# Patient Record
Sex: FEMALE | Race: WHITE | NOT HISPANIC OR LATINO | Employment: FULL TIME | URBAN - METROPOLITAN AREA
[De-identification: names, ages, dates, MRNs, and addresses within clinical notes are randomized per-mention and may not be internally consistent; named-entity substitution may affect disease eponyms.]

---

## 2017-07-13 ENCOUNTER — OFFICE VISIT (OUTPATIENT)
Dept: FAMILY MEDICINE | Facility: CLINIC | Age: 52
End: 2017-07-13
Payer: COMMERCIAL

## 2017-07-13 VITALS
HEIGHT: 70 IN | BODY MASS INDEX: 21.33 KG/M2 | DIASTOLIC BLOOD PRESSURE: 79 MMHG | TEMPERATURE: 97.3 F | WEIGHT: 149 LBS | OXYGEN SATURATION: 99 % | HEART RATE: 79 BPM | SYSTOLIC BLOOD PRESSURE: 120 MMHG

## 2017-07-13 DIAGNOSIS — R10.2 PELVIC PAIN IN FEMALE: Primary | ICD-10-CM

## 2017-07-13 DIAGNOSIS — K21.9 GASTROESOPHAGEAL REFLUX DISEASE WITHOUT ESOPHAGITIS: ICD-10-CM

## 2017-07-13 LAB
ALBUMIN UR-MCNC: NEGATIVE MG/DL
APPEARANCE UR: CLEAR
BILIRUB UR QL STRIP: NEGATIVE
COLOR UR AUTO: YELLOW
GLUCOSE UR STRIP-MCNC: NEGATIVE MG/DL
HGB UR QL STRIP: ABNORMAL
KETONES UR STRIP-MCNC: NEGATIVE MG/DL
LEUKOCYTE ESTERASE UR QL STRIP: NEGATIVE
MICRO REPORT STATUS: NORMAL
NITRATE UR QL: NEGATIVE
PH UR STRIP: 5.5 PH (ref 5–7)
RBC #/AREA URNS AUTO: ABNORMAL /HPF (ref 0–2)
SP GR UR STRIP: >1.03 (ref 1–1.03)
SPECIMEN SOURCE: NORMAL
URN SPEC COLLECT METH UR: ABNORMAL
UROBILINOGEN UR STRIP-ACNC: 0.2 EU/DL (ref 0.2–1)
WBC #/AREA URNS AUTO: ABNORMAL /HPF (ref 0–2)
WET PREP SPEC: NORMAL

## 2017-07-13 PROCEDURE — 87210 SMEAR WET MOUNT SALINE/INK: CPT | Performed by: PHYSICIAN ASSISTANT

## 2017-07-13 PROCEDURE — 99214 OFFICE O/P EST MOD 30 MIN: CPT | Performed by: PHYSICIAN ASSISTANT

## 2017-07-13 PROCEDURE — 81001 URINALYSIS AUTO W/SCOPE: CPT | Performed by: PHYSICIAN ASSISTANT

## 2017-07-13 RX ORDER — OMEPRAZOLE 40 MG/1
40 CAPSULE, DELAYED RELEASE ORAL DAILY
Qty: 90 CAPSULE | Refills: 1 | Status: SHIPPED | OUTPATIENT
Start: 2017-07-13 | End: 2017-07-19 | Stop reason: DRUGHIGH

## 2017-07-13 NOTE — MR AVS SNAPSHOT
After Visit Summary   7/13/2017    Emily Fritz    MRN: [id number]           Patient Information     Date Of Birth          1965        Visit Information        Provider Department      7/13/2017 1:30 PM Kehr, Kristen M, PA-C [facility]        Today's Diagnoses     Pelvic pain in female    -  1    Gastroesophageal reflux disease without esophagitis          Care Instructions    Contact Girish Márquez [phone] to schedule appointment for pelvic ultrasound          Follow-ups after your visit        Future tests that were ordered for you today     Open Future Orders        Priority Expected Expires Ordered    US Pelvic Complete w Transvaginal Routine  7/13/2018 7/13/2017            Who to contact     If you have questions or need follow up information about today's clinic visit or your schedule please contact [facility] directly at [phone].  Normal or non-critical lab and imaging results will be communicated to you by Zephyrhart, letter or phone within 4 business days after the clinic has received the results. If you do not hear from us within 7 days, please contact the clinic through Zephyrhart or phone. If you have a critical or abnormal lab result, we will notify you by phone as soon as possible.  Submit refill requests through Plexx or call your pharmacy and they will forward the refill request to us. Please allow 3 business days for your refill to be completed.          Additional Information About Your Visit        MyChart Information     Plexx gives you secure access to your electronic health record. If you see a primary care provider, you can also send messages to your care team and make appointments. If you have questions, please call your primary care clinic.  If you do not have a primary care provider, please call [phone] and they will assist you.        Care EveryWhere ID     This is your Care EveryWhere ID. This could be used by other  "organizations to access your [place] medical records  [id number]        Your Vitals Were     Pulse Temperature Height Pulse Oximetry Breastfeeding?       79 97.3  F (36.3  C) (Oral) 5' 10\" (1.778 m) 99% No     BMI (Body Mass Index)                   21.38 kg/m2            Blood Pressure from Last 3 Encounters:   07/13/17 120/79   09/23/16 133/87   09/12/16 120/80    Weight from Last 3 Encounters:   07/13/17 149 lb (67.6 kg)   09/23/16 146 lb 9.6 oz (66.5 kg)   09/12/16 149 lb (67.6 kg)              We Performed the Following     *UA reflex to Microscopic and Culture ([place] and [facility] (except [place] and [place])     Urine Microscopic     Wet prep          Where to get your medicines      These medications were sent to [facility]/pharmacy #[id number] - [place] [id number] [facility],  AT CORNER OF [address], [facility] [address]     Phone:  [phone]     omeprazole 40 MG capsule          Primary Care Provider Office Phone # Fax #    Kiley Vick [phone]       [facility] [address] [facility] [address]        Equal Access to Services     KELECHI PEMBERTON : Hadii jose antonio ku hadasho Soomaali, waaxda luqadaha, qaybta kaalmada adeegyada, waxay omeroin haysanya weller. So [facility] [phone].    ATENCIÓN: Si habla español, tiene a grider disposición servicios gratuitos de asistencia lingüística. Llame al [phone].    We comply with applicable federal civil rights laws and [place] laws. We do not discriminate on the basis of race, color, national origin, age, disability sex, sexual orientation or gender identity.            Thank you!     Thank you for choosing [facility]  for your care. Our goal is always to provide you with excellent care. Hearing back from our patients is one way we can continue to improve our services. Please take a few minutes to complete the written survey that you may receive in the " mail after your visit with us. Thank you!             Your Updated Medication List - Protect others around you: Learn how to safely use, store and throw away your medicines at www.disposemymeds.org.          This list is accurate as of: 7/13/17  2:27 PM.  Always use your most recent med list.                   Brand Name Dispense Instructions for use Diagnosis    azelastine 0.1 % spray    ASTELIN    3 Bottle    Spray 1-2 sprays into both nostrils 2 times daily    Chronic rhinitis, Dysfunction of eustachian tube, bilateral, Adhesive otitis media, left       conjugated estrogens cream    PREMARIN    30 g    Place 1 g vaginally See Admin Instructions Place 1 gram vaginally at night for 2 weeks, then reduce to twice weekly    Atrophic vaginitis       [employer]S PURELAX powder   Generic drug:  polyethylene glycol           fluticasone 50 MCG/ACT spray    FLONASE    16 g    Spray 2 sprays into both nostrils daily    Seasonal allergic rhinitis       * omeprazole 20 MG CR capsule    priLOSEC     Take 20 mg by mouth as needed        * omeprazole 40 MG capsule    priLOSEC    90 capsule    Take 1 capsule (40 mg) by mouth daily Take 30-60 minutes before a meal.    Gastroesophageal reflux disease without esophagitis       * Notice:  This list has 2 medication(s) that are the same as other medications prescribed for you. Read the directions carefully, and ask your doctor or other care provider to review them with you.

## 2017-07-13 NOTE — PROGRESS NOTES
SUBJECTIVE:                                                    Emily Fritz is a 52 year old female who presents to clinic today for the following health issues:      Emily has chronic pelvic pain. She has had some radiation of the pain in the lower right extremity when the pain is worse. She has had the pain associated with ovarian cysts in the past and concerned about having a cyst since the pain has been worse for a few months. There is no low back pain. No dysuria. She has had some vaginal discharge, but no bleeding.   Musculoskeletal problem/pain      Duration: x 1 year for pelvic and 2-3 months for both legs    Description  Location: r side pelvic pain and both legs    Intensity:  mild    Accompanying signs and symptoms: Sharp shooting pain    History  Previous similar problem: YES  Previous evaluation:  none    Precipitating or alleviating factors:  Trauma or overuse: no   Aggravating factors include: sitting    Therapies tried and outcome: nothing        Problem list and histories reviewed & adjusted, as indicated.  Additional history: as documented    Patient Active Problem List   Diagnosis     CARDIOVASCULAR SCREENING; LDL GOAL LESS THAN 160     Cholelithiasis     Diagnostic skin and sensitization tests     Allergic rhinitis due to animal dander     Rhinitis, allergic to other allergen     House dust mite allergy     Lateral epicondylitis     Vitamin D deficiency disease     Heart palpitations     Family history of colonic polyps     Family history of breast cancer in mother     Gastroesophageal reflux disease without esophagitis     Menorrhagia with regular cycle     Past Surgical History:   Procedure Laterality Date     C/SECTION, LOW TRANSVERSE  1988     CHOLECYSTECTOMY  2010     DILATION AND CURETTAGE, HYSTEROSCOPY DIAGNOSTIC, COMBINED  8/25/16    Menometrorrhagia       Social History   Substance Use Topics     Smoking status: Former Smoker     Years: 10.00     Quit date: 1/1/1995      Smokeless tobacco: Never Used      Comment: lives in smoke free household     Alcohol use No     Family History   Problem Relation Age of Onset     DIABETES Mother 78     Hypertension Mother      Thyroid Disease Mother      CANCER Mother 78     breast-mastectomy     Circulatory Mother      DVT     Breast Cancer Mother 79     Hypertension Father      CEREBROVASCULAR DISEASE Father      HEART DISEASE Father      CHF     Hypertension Sister      Other Cancer Sister      Carcinoid Cancer     Breast Cancer Maternal Aunt      postmenopausal     Macular Degeneration Paternal Aunt      Breast Cancer Maternal Aunt      postmenopausal     GASTROINTESTINAL DISEASE Sister      gastric carcinoid tumors with mets to liver     Glaucoma No family hx of          Current Outpatient Prescriptions   Medication Sig Dispense Refill     omeprazole (PRILOSEC) 20 MG CR capsule Take 20 mg by mouth as needed        omeprazole (PRILOSEC) 40 MG capsule Take 1 capsule (40 mg) by mouth daily Take 30-60 minutes before a meal. 90 capsule 1     azelastine (ASTELIN) 0.1 % nasal spray Spray 1-2 sprays into both nostrils 2 times daily 3 Bottle 1     conjugated estrogens (PREMARIN) vaginal cream Place 1 g vaginally See Admin Instructions Place 1 gram vaginally at night for 2 weeks, then reduce to twice weekly 30 g 12     CVS PURELAX powder   6     fluticasone (FLONASE) 50 MCG/ACT nasal spray Spray 2 sprays into both nostrils daily 16 g 1     Allergies   Allergen Reactions     Nkda [No Known Drug Allergies]        ROS:  C: NEGATIVE for fever, chills, change in weight  E/M: NEGATIVE for ear, mouth and throat problems  R: NEGATIVE for significant cough or SOB  CV: NEGATIVE for chest pain, palpitations or peripheral edema  GI: NEGATIVE for nausea, abdominal pain, heartburn, or change in bowel habits  : menopausal. No bleeding. She did have a D & C last year. No bleeding since. The pain has been ongoing in her pelvis. History of ovarian cysts and also  "history of C section.   MUSCULOSKELETAL: NEGATIVE for significant arthralgias or myalgia  PSYCHIATRIC: NEGATIVE for changes in mood or affect    OBJECTIVE:     /79  Pulse 79  Temp 97.3  F (36.3  C) (Oral)  Ht 5' 10\" (1.778 m)  Wt 149 lb (67.6 kg)  LMP   SpO2 99%  Breastfeeding? No  BMI 21.38 kg/m2  Body mass index is 21.38 kg/(m^2).  GENERAL: healthy, alert and no distress  ABDOMEN: soft, nontender, no hepatosplenomegaly, no masses and bowel sounds normal   (female): normal female external genitalia, normal urethral meatus, vaginal mucosa, normal cervix/adnexa/uterus without masses or discharge  MS: no gross musculoskeletal defects noted, no edema  SKIN: no suspicious lesions or rashes  BACK: no CVA tenderness, no paralumbar tenderness  PSYCH: mentation appears normal, affect normal/bright    Diagnostic Test Results:  Results for orders placed or performed in visit on 07/13/17   *UA reflex to Microscopic and Culture ([place] and [facility] (except [place] and [place])   Result Value Ref Range    Color Urine Yellow     Appearance Urine Clear     Glucose Urine Negative NEG mg/dL    Bilirubin Urine Negative NEG    Ketones Urine Negative NEG mg/dL    Specific Gravity Urine >1.030 1.003 - 1.035    Blood Urine Small (A) NEG    pH Urine 5.5 5.0 - 7.0 pH    Protein Albumin Urine Negative NEG mg/dL    Urobilinogen Urine 0.2 0.2 - 1.0 EU/dL    Nitrite Urine Negative NEG    Leukocyte Esterase Urine Negative NEG    Source Midstream Urine    Urine Microscopic   Result Value Ref Range    WBC Urine O - 2 0 - 2 /HPF    RBC Urine 2-5 (A) 0 - 2 /HPF   Wet prep   Result Value Ref Range    Specimen Description Vagina     Wet Prep       No Trichomonas seen  No clue cells seen  No yeast seen      Micro Report Status FINAL 07/13/2017        ASSESSMENT/PLAN:       1. Pelvic pain in female  The above results reviewed and negative.   Plan ultrasound.   - US Pelvic Complete w Transvaginal; Future  - Wet prep  - *UA " reflex to Microscopic and Culture ([place] and [facility] (except [place] and [place])  - Urine Microscopic    2. Gastroesophageal reflux disease without esophagitis  Refills given  - omeprazole (PRILOSEC) 40 MG capsule; Take 1 capsule (40 mg) by mouth daily Take 30-60 minutes before a meal.  Dispense: 90 capsule; Refill: 1      Kristen M. Kehr, PA-C  [facility]

## 2017-07-18 ENCOUNTER — RADIANT APPOINTMENT (OUTPATIENT)
Dept: ULTRASOUND IMAGING | Facility: CLINIC | Age: 52
End: 2017-07-18
Attending: PHYSICIAN ASSISTANT
Payer: COMMERCIAL

## 2017-07-18 DIAGNOSIS — R10.2 PELVIC PAIN IN FEMALE: ICD-10-CM

## 2017-07-18 PROCEDURE — 76830 TRANSVAGINAL US NON-OB: CPT

## 2017-07-18 PROCEDURE — 76856 US EXAM PELVIC COMPLETE: CPT

## 2017-09-05 ENCOUNTER — OFFICE VISIT (OUTPATIENT)
Dept: OPTOMETRY | Facility: CLINIC | Age: 52
End: 2017-09-05
Payer: COMMERCIAL

## 2017-09-05 DIAGNOSIS — H04.123 DRY EYES: ICD-10-CM

## 2017-09-05 DIAGNOSIS — H52.03 HYPEROPIA, BILATERAL: Primary | ICD-10-CM

## 2017-09-05 DIAGNOSIS — H52.4 PRESBYOPIA: ICD-10-CM

## 2017-09-05 PROCEDURE — 92310 CONTACT LENS FITTING OU: CPT | Mod: GA | Performed by: OPTOMETRIST

## 2017-09-05 PROCEDURE — 92015 DETERMINE REFRACTIVE STATE: CPT | Performed by: OPTOMETRIST

## 2017-09-05 PROCEDURE — 92014 COMPRE OPH EXAM EST PT 1/>: CPT | Performed by: OPTOMETRIST

## 2017-09-05 ASSESSMENT — REFRACTION_WEARINGRX
OS_AXIS: 032
OS_ADD: +2.00
OS_CYLINDER: +0.50
SPECS_TYPE: PAL
OS_SPHERE: +2.00
OD_ADD: +2.00
OD_SPHERE: +2.50

## 2017-09-05 ASSESSMENT — REFRACTION_MANIFEST
METHOD_AUTOREFRACTION: 1
OS_SPHERE: +2.25
OD_SPHERE: +2.50
OS_AXIS: 072
OS_SPHERE: +2.25
OD_SPHERE: +2.25
OS_CYLINDER: SPHERE
OS_CYLINDER: +0.50

## 2017-09-05 ASSESSMENT — EXTERNAL EXAM - RIGHT EYE: OD_EXAM: NORMAL

## 2017-09-05 ASSESSMENT — VISUAL ACUITY
CORRECTION_TYPE: GLASSES
OS_CC: 20/100-1
OS_CC: 20/20
OD_CC: 20/20
METHOD: SNELLEN - LINEAR
OD_CC+: -1
OD_CC: 20/200

## 2017-09-05 ASSESSMENT — KERATOMETRY
OD_K1POWER_DIOPTERS: 42.50
OD_K2POWER_DIOPTERS: 43.25
OD_AXISANGLE2_DEGREES: 169
OS_K1POWER_DIOPTERS: 43.00
OS_K2POWER_DIOPTERS: 44.00
OS_AXISANGLE2_DEGREES: 1

## 2017-09-05 ASSESSMENT — CONF VISUAL FIELD
OS_NORMAL: 1
OD_NORMAL: 1

## 2017-09-05 ASSESSMENT — EXTERNAL EXAM - LEFT EYE: OS_EXAM: NORMAL

## 2017-09-05 ASSESSMENT — SLIT LAMP EXAM - LIDS
COMMENTS: NORMAL
COMMENTS: NORMAL

## 2017-09-05 NOTE — PATIENT INSTRUCTIONS
Patient was advised of today's exam findings.  Optional to fill new glasses prescription, minimal change  Check with [facility] about current glasses - progressive not working   Order contact lenses trials, pick them up and wear to contact lenses check appointment / dilation  Return in 1 year for eye exam      Meera Roach O.D.  [facility]   [address] [place] [address] [phone]

## 2017-09-05 NOTE — MR AVS SNAPSHOT
After Visit Summary   9/5/2017    Emily Fritz    MRN: [id number]           Patient Information     Date Of Birth          1965        Visit Information        Provider Department      9/5/2017 8:30 AM Meera Roach, UBALDO [facility]        Today's Diagnoses     Hyperopia, bilateral    -  1    Presbyopia          Care Instructions    Patient was advised of today's exam findings.  Optional to fill new glasses prescription, minimal change  Check with [facility] about current glasses - progressive not working   Order contact lenses trials, pick them up and wear to contact lenses check appointment / dilation  Return in 1 year for eye exam      Meera Roach O.D.  [facility]   [address] [place] [address] [phone]              Follow-ups after your visit        Who to contact     If you have questions or need follow up information about today's clinic visit or your schedule please contact [facility] directly at [phone].  Normal or non-critical lab and imaging results will be communicated to you by [employer], letter or phone within 4 business days after the clinic has received the results. If you do not hear from us within 7 days, please contact the clinic through [employer] or phone. If you have a critical or abnormal lab result, we will notify you by phone as soon as possible.  Submit refill requests through [employer] or call your pharmacy and they will forward the refill request to us. Please allow 3 business days for your refill to be completed.          Additional Information About Your Visit        MyChart Information     [employer] gives you secure access to your electronic health record. If you see a primary care provider, you can also send messages to your care team and make appointments. If you have questions, please call your primary care clinic.  If you do not have a primary care provider, please call [phone] and they will assist  you.        Care EveryWhere ID     This is your Care EveryWhere ID. This could be used by other organizations to access your [place] medical records  [id number]         Blood Pressure from Last 3 Encounters:   07/13/17 120/79   09/23/16 133/87   09/12/16 120/80    Weight from Last 3 Encounters:   07/13/17 67.6 kg (149 lb)   09/23/16 66.5 kg (146 lb 9.6 oz)   09/12/16 67.6 kg (149 lb)              We Performed the Following     CONTACT LENS FITTING,BILAT     EYE EXAM (SIMPLE-NONBILLABLE)     REFRACTION        Primary Care Provider Office Phone # Fax #    Kiley Cayla Vick [phone] [address] [facility] [address]        Equal Access to Services     KELECHI PEMBERTON : Hadii aad ku hadasho Soomaali, waaxda luqadaha, qaybta kaalmada adeegyada, waxay idiin hayaan aurora garcia . So [facility] [phone].    ATENCIÓN: Si habla español, tiene a grider disposición servicios gratuitos de asistencia lingüística. Llame al [phone].    We comply with applicable federal civil rights laws and [place] laws. We do not discriminate on the basis of race, color, national origin, age, disability sex, sexual orientation or gender identity.            Thank you!     Thank you for choosing [facility]  for your care. Our goal is always to provide you with excellent care. Hearing back from our patients is one way we can continue to improve our services. Please take a few minutes to complete the written survey that you may receive in the mail after your visit with us. Thank you!             Your Updated Medication List - Protect others around you: Learn how to safely use, store and throw away your medicines at www.disposemymeds.org.          This list is accurate as of: 9/5/17  9:13 AM.  Always use your most recent med list.                   Brand Name Dispense Instructions for use Diagnosis    azelastine 0.1 % spray    ASTELIN    3 Bottle    Spray 1-2 sprays into both nostrils 2 times  daily    Chronic rhinitis, Dysfunction of eustachian tube, bilateral, Adhesive otitis media, left       conjugated estrogens cream    PREMARIN    30 g    Place 1 g vaginally See Admin Instructions Place 1 gram vaginally at night for 2 weeks, then reduce to twice weekly    Atrophic vaginitis       CVS PURELAX powder   Generic drug:  polyethylene glycol           fluticasone 50 MCG/ACT spray    FLONASE    16 g    Spray 2 sprays into both nostrils daily    Seasonal allergic rhinitis       * omeprazole 20 MG CR capsule    priLOSEC     Take 20 mg by mouth as needed        * omeprazole 20 MG CR capsule    priLOSEC    90 capsule    Take 1 capsule (20 mg) by mouth daily    Gastroesophageal reflux disease without esophagitis       * Notice:  This list has 2 medication(s) that are the same as other medications prescribed for you. Read the directions carefully, and ask your doctor or other care provider to review them with you.

## 2017-09-05 NOTE — PROGRESS NOTES
Chief Complaint   Patient presents with     COMPREHENSIVE EYE EXAM     Contact Lens Re-fitting     fee, waiver      needs contact lenses for wedding    Previous contact lens wearer? Yes: [employer] Multifocal  Comfort of contact lenses :good for 5 hours , dry after that  Satisfied with current lenses: Yes, would like near vision better     Wears contact lenses about 6 times a month    Last Eye Exam: 6/17/16  Dilated Previously: Yes    What are you currently using to see?  glasses and contacts    Distance Vision Acuity: Satisfied with vision    Near Vision Acuity: Satisfied with vision while reading and using computer with glasses or contacts, possible slight change    Eye Comfort: good, dry at times  Do you use eye drops? 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       OBJECTIVE: See Ophthalmology exam    ASSESSMENT:    ICD-10-CM    1. Hyperopia, bilateral H52.03 EYE EXAM (SIMPLE-NONBILLABLE)     REFRACTION     CONTACT LENS FITTING,BILAT   2. Presbyopia H52.4 EYE EXAM (SIMPLE-NONBILLABLE)     REFRACTION     CONTACT LENS FITTING,BILAT   3. Dry eyes H04.123       PLAN:     Patient Instructions   Patient was advised of today's exam findings.  Optional to fill new glasses prescription, minimal change  Check with [facility] about current glasses - progressive not working   Order contact lenses trials, pick them up and wear to contact lenses check appointment / dilation  Return in 1 year for eye exam      Meera Roach O.D.  [facility]   [address] [place], [place] [address] [phone]

## 2017-09-06 PROBLEM — H04.123 DRY EYES: Status: ACTIVE | Noted: 2017-09-06

## 2017-09-06 ASSESSMENT — REFRACTION_MANIFEST
OS_ADD: +2.00
OD_CYLINDER: SPHERE
OD_ADD: +2.00

## 2017-09-06 ASSESSMENT — REFRACTION_CURRENTRX
OS_SPHERE: +2.00
OS_BASECURVE: 8.60
OS_ADD: LOW
OD_DIAMETER: 14.0
OD_ADD: LOW
OD_BASECURVE: 8.6
OS_DIAMETER: 14.1
OD_BASECURVE: 8.60
OD_ADDL_SPECS: N
OS_SPHERE: +2.00
OD_BRAND: BIOFINITY MULTIFOCAL
OD_ADD: +2.00
OS_ADD: +2.00
OS_DIAMETER: 14.0
OS_BRAND: BIOFINITY MULTIFOCAL
OD_DIAMETER: 14.1
OS_BASECURVE: 8.6
OD_SPHERE: +2.00
OS_ADDL_SPECS: D
OD_SPHERE: +2.00

## 2017-09-14 ENCOUNTER — E-VISIT (OUTPATIENT)
Dept: FAMILY MEDICINE | Facility: CLINIC | Age: 52
End: 2017-09-14
Payer: COMMERCIAL

## 2017-09-14 DIAGNOSIS — Z83.79 FAMILY HISTORY OF CELIAC DISEASE: Primary | ICD-10-CM

## 2017-09-14 PROCEDURE — 99444 ZZC PHYSICIAN ONLINE EVALUATION & MANAGEMENT SERVICE: CPT | Performed by: FAMILY MEDICINE

## 2017-09-18 ENCOUNTER — MYC REFILL (OUTPATIENT)
Dept: OTOLARYNGOLOGY | Facility: CLINIC | Age: 52
End: 2017-09-18

## 2017-09-18 DIAGNOSIS — Z83.79 FAMILY HISTORY OF CELIAC DISEASE: ICD-10-CM

## 2017-09-18 DIAGNOSIS — H74.12 ADHESIVE OTITIS MEDIA, LEFT: ICD-10-CM

## 2017-09-18 DIAGNOSIS — H69.93 DYSFUNCTION OF EUSTACHIAN TUBE, BILATERAL: ICD-10-CM

## 2017-09-18 DIAGNOSIS — J31.0 CHRONIC RHINITIS: ICD-10-CM

## 2017-09-18 PROCEDURE — 36415 COLL VENOUS BLD VENIPUNCTURE: CPT | Performed by: FAMILY MEDICINE

## 2017-09-18 PROCEDURE — 83516 IMMUNOASSAY NONANTIBODY: CPT | Mod: 59 | Performed by: FAMILY MEDICINE

## 2017-09-18 RX ORDER — AZELASTINE 1 MG/ML
1-2 SPRAY, METERED NASAL 2 TIMES DAILY
Qty: 3 BOTTLE | Refills: 0 | Status: SHIPPED | OUTPATIENT
Start: 2017-09-18 | End: 2022-09-12

## 2017-09-18 NOTE — TELEPHONE ENCOUNTER
Message from [email or web address]:  Original authorizing provider: MD Elsa Masters would like a refill of the following medications:  azelastine (ASTELIN) 0.1 % nasal spray [Cristian Plummer MD]    Preferred pharmacy: [facility]/PHARMACY #[id number] [facility] [address] [facility],  AT [address] [facility]    Comment:

## 2017-09-19 ENCOUNTER — OFFICE VISIT (OUTPATIENT)
Dept: OPTOMETRY | Facility: CLINIC | Age: 52
End: 2017-09-19
Payer: COMMERCIAL

## 2017-09-19 DIAGNOSIS — H52.4 PRESBYOPIA: ICD-10-CM

## 2017-09-19 DIAGNOSIS — H52.03 HYPEROPIA, BILATERAL: Primary | ICD-10-CM

## 2017-09-19 LAB
TTG IGA SER-ACNC: <1 U/ML
TTG IGG SER-ACNC: <1 U/ML

## 2017-09-19 PROCEDURE — 99207 ZZC NO BILLABLE SERVICE THIS VISIT: CPT | Performed by: OPTOMETRIST

## 2017-09-19 PROCEDURE — 92499 UNLISTED OPH SVC/PROCEDURE: CPT | Performed by: OPTOMETRIST

## 2017-09-19 ASSESSMENT — CUP TO DISC RATIO
OS_RATIO: 0.15
OD_RATIO: 0.15

## 2017-09-19 ASSESSMENT — EXTERNAL EXAM - RIGHT EYE: OD_EXAM: NORMAL

## 2017-09-19 ASSESSMENT — EXTERNAL EXAM - LEFT EYE: OS_EXAM: NORMAL

## 2017-09-19 ASSESSMENT — VISUAL ACUITY
CORRECTION_TYPE: CONTACTS
OD_CC: 20/70
OD_CC: 20/25
OS_CC: 20/25
OD_CC+: -2
OS_CC+: -1
OS_CC: 20/50
METHOD: SNELLEN - LINEAR

## 2017-09-19 ASSESSMENT — TONOMETRY
OS_IOP_MMHG: 12
OD_IOP_MMHG: 12
IOP_METHOD: APPLANATION

## 2017-09-19 ASSESSMENT — SLIT LAMP EXAM - LIDS
COMMENTS: NORMAL
COMMENTS: NORMAL

## 2017-09-19 NOTE — MR AVS SNAPSHOT
After Visit Summary   9/19/2017    Emily Fritz    MRN: [id number]           Patient Information     Date Of Birth          1965        Visit Information        Provider Department      9/19/2017 1:20 PM Meera Roach OD [facility]        Care Instructions    Patient was advised of today's exam findings.  Dilation completed today  Order trials, pick them up and wear to contact lenses check appointment.      Meera Roach O.D.  [facility]   [address] [place] [address]  [phone]              Follow-ups after your visit        Who to contact     If you have questions or need follow up information about today's clinic visit or your schedule please contact [facility] directly at [phone].  Normal or non-critical lab and imaging results will be communicated to you by MyChart, letter or phone within 4 business days after the clinic has received the results. If you do not hear from us within 7 days, please contact the clinic through MyChart or phone. If you have a critical or abnormal lab result, we will notify you by phone as soon as possible.  Submit refill requests through [employer] or call your pharmacy and they will forward the refill request to us. Please allow 3 business days for your refill to be completed.          Additional Information About Your Visit        MyChart Information     [employer] gives you secure access to your electronic health record. If you see a primary care provider, you can also send messages to your care team and make appointments. If you have questions, please call your primary care clinic.  If you do not have a primary care provider, please call [phone] and they will assist you.        Care EveryWhere ID     This is your Care EveryWhere ID. This could be used by other organizations to access your [place] medical records  [id number]         Blood Pressure from Last 3 Encounters:   07/13/17 120/79    09/23/16 133/87   09/12/16 120/80    Weight from Last 3 Encounters:   07/13/17 67.6 kg (149 lb)   09/23/16 66.5 kg (146 lb 9.6 oz)   09/12/16 67.6 kg (149 lb)              Today, you had the following     No orders found for display       Primary Care Provider Office Phone # Fax #    Kiley Cayla Vick [phone] [address] [facility] [address]        Equal Access to Services     [facility] NILAY : Hadii aad ku hadasho Soomaali, waaxda luqadaha, qaybta kaalmada adeegyada, waxay idiin hayaan adegold garcia . So [facility] [phone].    ATENCIÓN: Si habla español, tiene a grider disposición servicios gratuitos de asistencia lingüística. [facility] [phone].    We comply with applicable federal civil rights laws and [place] laws. We do not discriminate on the basis of race, color, national origin, age, disability sex, sexual orientation or gender identity.            Thank you!     Thank you for choosing [facility]  for your care. Our goal is always to provide you with excellent care. Hearing back from our patients is one way we can continue to improve our services. Please take a few minutes to complete the written survey that you may receive in the mail after your visit with us. Thank you!             Your Updated Medication List - Protect others around you: Learn how to safely use, store and throw away your medicines at www.disposemymeds.org.          This list is accurate as of: 9/19/17  1:38 PM.  Always use your most recent med list.                   Brand Name Dispense Instructions for use Diagnosis    azelastine 0.1 % spray    ASTELIN    3 Bottle    Spray 1-2 sprays into both nostrils 2 times daily    Chronic rhinitis, Dysfunction of eustachian tube, bilateral, Adhesive otitis media, left       conjugated estrogens cream    PREMARIN    30 g    Place 1 g vaginally See Admin Instructions Place 1 gram vaginally at night for 2 weeks, then reduce to twice weekly     Atrophic vaginitis       CVS PURELAX powder   Generic drug:  polyethylene glycol           fluticasone 50 MCG/ACT spray    FLONASE    16 g    Spray 2 sprays into both nostrils daily    Seasonal allergic rhinitis       * omeprazole 20 MG CR capsule    priLOSEC     Take 20 mg by mouth as needed        * omeprazole 20 MG CR capsule    priLOSEC    90 capsule    Take 1 capsule (20 mg) by mouth daily    Gastroesophageal reflux disease without esophagitis       * Notice:  This list has 2 medication(s) that are the same as other medications prescribed for you. Read the directions carefully, and ask your doctor or other care provider to review them with you.

## 2017-09-19 NOTE — PATIENT INSTRUCTIONS
Patient was advised of today's exam findings.  Dilation completed today  Order trials, pick them up and wear to contact lenses check appointment.      Meera Roach O.D.  [facility]   [address] [place] [address] [phone]

## 2017-09-19 NOTE — PROGRESS NOTES
Chief Complaint   Patient presents with     Contact Lens Check     and DFE     Dilation to complete 9/5/2017      Satisfied with contacts:  Yes , seem to be working well   Good comfort:  Yes  Clear vision:     Ok, but seems to have to refocus when going from keyboard to computer screen, some fuzziness     Charmaine Apple Optometric Assistant           Medical, surgical and family histories reviewed and updated 9/19/2017.       OBJECTIVE: See Ophthalmology exam    ASSESSMENT:    ICD-10-CM    1. Hyperopia, bilateral H52.03 CONTACT LENS CHECK   2. Presbyopia H52.4 CONTACT LENS CHECK      PLAN:    Patient Instructions   Patient was advised of today's exam findings.  Dilation completed today  Order trials, pick them up and wear to contact lenses check appointment.      Meera Roach O.D.  [facility]   [id number] [place] [id number] [phone]                     .

## 2017-09-20 ASSESSMENT — REFRACTION_CURRENTRX
OD_BASECURVE: 8.6
OD_SPHERE: +2.00
OD_SPHERE: +2.50
OS_ADD: LOW
OS_ADD: LOW
OS_SPHERE: +2.25
OS_SPHERE: +2.00
OS_BASECURVE: 8.6
OS_DIAMETER: 14.1
OD_ADD: LOW
OD_DIAMETER: 14.1
OS_BASECURVE: 8.6
OD_ADD: LOW
OS_DIAMETER: 14.1
OD_DIAMETER: 14.1
OD_BASECURVE: 8.6

## 2017-10-30 ENCOUNTER — OFFICE VISIT (OUTPATIENT)
Dept: OPTOMETRY | Facility: CLINIC | Age: 52
End: 2017-10-30
Payer: COMMERCIAL

## 2017-10-30 DIAGNOSIS — H52.4 PRESBYOPIA: Primary | ICD-10-CM

## 2017-10-30 DIAGNOSIS — H52.03 HYPERMETROPIA OF BOTH EYES: ICD-10-CM

## 2017-10-30 PROCEDURE — 92499 UNLISTED OPH SVC/PROCEDURE: CPT | Performed by: OPTOMETRIST

## 2017-10-30 PROCEDURE — 99207 ZZC NO BILLABLE SERVICE THIS VISIT: CPT | Performed by: OPTOMETRIST

## 2017-10-30 ASSESSMENT — REFRACTION_CURRENTRX
OD_DIAMETER: 14.1
OS_BASECURVE: 8.6
OD_DIAMETER: 14.1
OD_BASECURVE: 8.6
OS_SPHERE: +2.00
OS_DIAMETER: 14.1
OS_ADD: LOW
OS_BASECURVE: 8.6
OD_ADD: LOW
OD_SPHERE: +2.50
OS_DIAMETER: 14.1
OS_SPHERE: +2.25
OS_ADD: LOW
OD_SPHERE: +2.50
OD_BASECURVE: 8.6
OD_ADD: LOW

## 2017-10-30 ASSESSMENT — VISUAL ACUITY
OS_CC: 20/50+2
OD_PH_CC: 20/30-1
OD_CC: 20/40
OS_CC: 20/30
OD_CC+: -1
OS_CC+: -2
CORRECTION_TYPE: CONTACTS
METHOD: SNELLEN - LINEAR
OD_CC: 20/30-1

## 2017-10-30 NOTE — PROGRESS NOTES
Chief Complaint   Patient presents with     Contact Lens Check     Satisfied with contacts:  Yes    Good comfort:  Yes, left eye does seem to get dry fast. Said that she does have a generic [facility] drops for contact lens wearers that she uses   Clear vision:     Yes    Charmaine Randall Optometric Assistant           Medical, surgical and family histories reviewed and updated 10/30/2017.       OBJECTIVE: See Ophthalmology exam    ASSESSMENT:    ICD-10-CM    1. Hypermetropia of both eyes H52.03    2. Presbyopia H52.4       PLAN:    Patient Instructions   Patient was advised of today's exam findings.  Use contact lens rewetting drops at least twice a day.  Blink Contacts rewetting drop, Systane Contact rewetting drop and Refresh Contact rewetting drop  Order trials of new prescription to improve diagnosis vision.  Contact lenses prescription released to patient, ok to order supply if content with vision at computer and far   Return to clinic as needed contact lenses check appointment       To order your contacts online please log on to [email or web address] .  Go to the link which is found on the [facility] Care [facility] Vision Services page.    Another option is to call  [phone] to order your contacts.                            Meera Roach O.D.  [facility]   [address] [place], [place] [id number] [phone]

## 2017-10-30 NOTE — PATIENT INSTRUCTIONS
Patient was advised of today's exam findings.  Use contact lens rewetting drops at least twice a day.  Blink Contacts rewetting drop, Systane Contact rewetting drop and Refresh Contact rewetting drop  Order trials of new prescription to improve diagnosis vision.  Contact lenses prescription released to patient, ok to order supply if content with vision at computer and far   Return to clinic as needed contact lenses check appointment       To order your contacts online please log on to [email or web address] .  Go to the link which is found on the Eye Care [facility] Vision Services page.    Another option is to call  [phone] to order your contacts.                            Meera Roach O.D.  [facility]   [address] [place], [place] [id number] [phone]

## 2017-10-30 NOTE — MR AVS SNAPSHOT
After Visit Summary   10/30/2017    Emily Fritz    MRN: [id number]           Patient Information     Date Of Birth          1965        Visit Information        Provider Department      10/30/2017 11:20 AM Meera Roach OD [facility]        Care Instructions    Patient was advised of today's exam findings.  Use contact lens rewetting drops at least twice a day.  Blink Contacts rewetting drop, Systane Contact rewetting drop and Refresh Contact rewetting drop  Order trials of new prescription to improve diagnosis vision.  Contact lenses prescription released to patient, ok to order supply if content with vision at computer and far   Return to clinic as needed contact lenses check appointment       To order your contacts online please log on to [employer] .  Go to the link which is found on the Eye Care [facility] Vision Services page.    Another option is to call  [phone] to order your contacts.                            Meera Roach O.D.  [facility]   [address] [place] [address]  [phone]            Follow-ups after your visit        Who to contact     If you have questions or need follow up information about today's clinic visit or your schedule please contact [facility] directly at [phone].  Normal or non-critical lab and imaging results will be communicated to you by MyChart, letter or phone within 4 business days after the clinic has received the results. If you do not hear from us within 7 days, please contact the clinic through [employer] or phone. If you have a critical or abnormal lab result, we will notify you by phone as soon as possible.  Submit refill requests through [employer] or call your pharmacy and they will forward the refill request to us. Please allow 3 business days for your refill to be completed.          Additional Information About Your Visit        Datap[employer] Information     [employer] gives you secure access  to your electronic health record. If you see a primary care provider, you can also send messages to your care team and make appointments. If you have questions, please call your primary care clinic.  If you do not have a primary care provider, please call [phone] and they will assist you.        Care EveryWhere ID     This is your Care EveryWhere ID. This could be used by other organizations to access your [place] medical records  [id number]         Blood Pressure from Last 3 Encounters:   07/13/17 120/79   09/23/16 133/87   09/12/16 120/80    Weight from Last 3 Encounters:   07/13/17 67.6 kg (149 lb)   09/23/16 66.5 kg (146 lb 9.6 oz)   09/12/16 67.6 kg (149 lb)              Today, you had the following     No orders found for display       Primary Care Provider Office Phone # Fax #    Kiley Cayla Vick [phone] [address] [facility] [address]        Equal Access to Services     KRISTY PEMBERTON : Hadii aad ku hadasho Soomaali, waaxda luqadaha, qaybta kaalmada adeegyada, waxay idiin hayaan aurora weiaraavani garcia . So [facility] [phone].    ATENCIÓN: Si habla español, tiene a grider disposición servicios gratuitos de asistencia lingüística. [facility] [phone].    We comply with applicable federal civil rights laws and [place] laws. We do not discriminate on the basis of race, color, national origin, age, disability, sex, sexual orientation, or gender identity.            Thank you!     Thank you for choosing [facility]  for your care. Our goal is always to provide you with excellent care. Hearing back from our patients is one way we can continue to improve our services. Please take a few minutes to complete the written survey that you may receive in the mail after your visit with us. Thank you!             Your Updated Medication List - Protect others around you: Learn how to safely use, store and throw away your medicines at www.disposemymeds.org.          This  list is accurate as of: 10/30/17 12:01 PM.  Always use your most recent med list.                   Brand Name Dispense Instructions for use Diagnosis    azelastine 0.1 % spray    ASTELIN    3 Bottle    Spray 1-2 sprays into both nostrils 2 times daily    Chronic rhinitis, Dysfunction of Eustachian tube, bilateral, Adhesive otitis media, left       conjugated estrogens cream    PREMARIN    30 g    Place 1 g vaginally See Admin Instructions Place 1 gram vaginally at night for 2 weeks, then reduce to twice weekly    Atrophic vaginitis       [facility] PURELAX powder   Generic drug:  polyethylene glycol           fluticasone 50 MCG/ACT spray    FLONASE    16 g    Spray 2 sprays into both nostrils daily    Seasonal allergic rhinitis       * omeprazole 20 MG CR capsule    priLOSEC     Take 20 mg by mouth as needed        * omeprazole 20 MG CR capsule    priLOSEC    90 capsule    Take 1 capsule (20 mg) by mouth daily    Gastroesophageal reflux disease without esophagitis       * Notice:  This list has 2 medication(s) that are the same as other medications prescribed for you. Read the directions carefully, and ask your doctor or other care provider to review them with you.

## 2017-10-31 ASSESSMENT — VISUAL ACUITY
VA_OR_OS_CURRENT_RX: 25-3
VA_OR_OD_CURRENT_RX: 20/25

## 2017-11-09 ENCOUNTER — RADIANT APPOINTMENT (OUTPATIENT)
Dept: MAMMOGRAPHY | Facility: CLINIC | Age: 52
End: 2017-11-09
Payer: COMMERCIAL

## 2017-11-09 DIAGNOSIS — Z12.31 VISIT FOR SCREENING MAMMOGRAM: ICD-10-CM

## 2017-11-09 PROCEDURE — G0202 SCR MAMMO BI INCL CAD: HCPCS | Mod: TC

## 2017-11-22 ENCOUNTER — OFFICE VISIT (OUTPATIENT)
Dept: FAMILY MEDICINE | Facility: CLINIC | Age: 52
End: 2017-11-22
Payer: COMMERCIAL

## 2017-11-22 VITALS
SYSTOLIC BLOOD PRESSURE: 119 MMHG | WEIGHT: 148 LBS | DIASTOLIC BLOOD PRESSURE: 75 MMHG | HEART RATE: 85 BPM | BODY MASS INDEX: 21.19 KG/M2 | TEMPERATURE: 97.3 F | HEIGHT: 70 IN | OXYGEN SATURATION: 100 %

## 2017-11-22 DIAGNOSIS — Z00.00 ROUTINE GENERAL MEDICAL EXAMINATION AT A HEALTH CARE FACILITY: Primary | ICD-10-CM

## 2017-11-22 DIAGNOSIS — R06.09 DOE (DYSPNEA ON EXERTION): ICD-10-CM

## 2017-11-22 DIAGNOSIS — N95.2 ATROPHIC VAGINITIS: ICD-10-CM

## 2017-11-22 PROCEDURE — G0145 SCR C/V CYTO,THINLAYER,RESCR: HCPCS | Performed by: FAMILY MEDICINE

## 2017-11-22 PROCEDURE — 93000 ELECTROCARDIOGRAM COMPLETE: CPT | Performed by: FAMILY MEDICINE

## 2017-11-22 PROCEDURE — 99396 PREV VISIT EST AGE 40-64: CPT | Mod: 25 | Performed by: FAMILY MEDICINE

## 2017-11-22 PROCEDURE — 87624 HPV HI-RISK TYP POOLED RSLT: CPT | Performed by: FAMILY MEDICINE

## 2017-11-22 NOTE — NURSING NOTE
"Chief Complaint   Patient presents with     Physical       Initial /75 (Cuff Size: Adult Regular)  Pulse 85  Temp 97.3  F (36.3  C) (Oral)  Ht 5' 10.25\" (1.784 m)  Wt 148 lb (67.1 kg)  LMP 07/22/2016  SpO2 100%  Breastfeeding? No  BMI 21.08 kg/m2 Estimated body mass index is 21.08 kg/(m^2) as calculated from the following:    Height as of this encounter: 5' 10.25\" (1.784 m).    Weight as of this encounter: 148 lb (67.1 kg).  Medication Reconciliation: complete    Amaris Chung LPN    "

## 2017-11-22 NOTE — PROGRESS NOTES
SUBJECTIVE:   CC: Emily Fritz is an 52 year old woman who presents for preventive health visit.     Healthy Habits:    Do you get at least three servings of calcium containing foods daily (dairy, green leafy vegetables, etc.)? no    Amount of exercise or daily activities, outside of work: 2-3 day(s) per week    Problems taking medications regularly not applicable    Medication side effects: No    Have you had an eye exam in the past two years? no    Do you see a dentist twice per year? yes    Do you have sleep apnea, excessive snoring or daytime drowsiness?no          Notes increased shortness of breath with exercise/exertion - more than she expects. Has occas pounding of her heart. No previous cardiac hx    Today's PHQ-2 Score:   PHQ-2 ( 1999 [employer]) 11/22/2017 6/6/2016   Q1: Little interest or pleasure in doing things 0 0   Q2: Feeling down, depressed or hopeless 1 0   PHQ-2 Score 1 0   Q1: Little interest or pleasure in doing things Not at all -   Q2: Feeling down, depressed or hopeless Several days -   PHQ-2 Score 1 -         Abuse: Current or Past(Physical, Sexual or Emotional)- No  Do you feel safe in your environment - Yes  Social History   Substance Use Topics     Smoking status: Former Smoker     Years: 10.00     Quit date: 1/1/1995     Smokeless tobacco: Never Used      Comment: lives in smoke free household     Alcohol use No     The patient does not drink >3 drinks per day nor >7 drinks per week.    Reviewed orders with patient.  Reviewed health maintenance and updated orders accordingly - Yes    G 3 P 3   Patient's last menstrual period was 07/22/2016.     Fasting: No   Td: tdap 2103       Last 3 Pap Results:   PAP (no units)   Date Value   12/10/2014 NIL   04/12/2011 NIL        HPV: neg 2011          Cholesterol:   Lab Results   Component Value Date    CHOL 169 04/01/2013     Lab Results   Component Value Date    HDL 61 04/01/2013     Lab Results   Component Value Date    LDL 95 04/01/2013      Lab Results   Component Value Date    TRIG 58 2013     Lab Results   Component Value Date    CHOLHDLRATIO 2.7 2013         MM/17  Dexa:  NA     Flex/colo: 4/15, q5 yr      Seat Belt: Yes    Sunscreen use: No  Calcium Intake: adeq  Health Care Directive: Yes   Sexually Active: Yes     Current contraception: vasectomy  History of abnormal Pap smear: No  Family history of colon/breast/ovarian cancer: Yes: see [facility]  Regular self breast exam: No  History of abnormal mammogram: No      Labs reviewed in EPIC  BP Readings from Last 3 Encounters:   17 119/75   17 120/79   16 133/87    Wt Readings from Last 3 Encounters:   17 148 lb (67.1 kg)   17 149 lb (67.6 kg)   16 146 lb 9.6 oz (66.5 kg)                  Patient Active Problem List   Diagnosis     CARDIOVASCULAR SCREENING; LDL GOAL LESS THAN 160     Cholelithiasis     Diagnostic skin and sensitization tests     Allergic rhinitis due to animal dander     Rhinitis, allergic to other allergen     House dust mite allergy     Lateral epicondylitis     Vitamin D deficiency disease     Heart palpitations     Family history of colonic polyps     Family history of breast cancer in mother     Gastroesophageal reflux disease without esophagitis     Menorrhagia with regular cycle     Dry eyes     Past Surgical History:   Procedure Laterality Date     C/SECTION, LOW TRANSVERSE       CHOLECYSTECTOMY       DILATION AND CURETTAGE, HYSTEROSCOPY DIAGNOSTIC, COMBINED  16    Menometrorrhagia       Social History   Substance Use Topics     Smoking status: Former Smoker     Years: 10.00     Quit date: 1995     Smokeless tobacco: Never Used      Comment: lives in smoke free household     Alcohol use No     Family History   Problem Relation Age of Onset     DIABETES Mother 78     Hypertension Mother      Thyroid Disease Mother      CANCER Mother 78     breast-mastectomy     Circulatory Mother      DVT     Breast Cancer  Mother 79     Hypertension Father      CEREBROVASCULAR DISEASE Father      HEART DISEASE Father      CHF     Hypertension Sister      Other Cancer Sister      Carcinoid Cancer     Breast Cancer Maternal Aunt      postmenopausal     Macular Degeneration Paternal Aunt      Breast Cancer Maternal Aunt      postmenopausal     GASTROINTESTINAL DISEASE Sister      gastric carcinoid tumors with mets to liver     Glaucoma No family hx of          Current Outpatient Prescriptions   Medication Sig Dispense Refill     azelastine (ASTELIN) 0.1 % spray Spray 1-2 sprays into both nostrils 2 times daily 3 Bottle 0     [facility]S PURELAX powder   6     omeprazole (PRILOSEC) 20 MG CR capsule Take 1 capsule (20 mg) by mouth daily (Patient not taking: Reported on 11/22/2017) 90 capsule 3     conjugated estrogens (PREMARIN) vaginal cream Place 1 g vaginally See Admin Instructions Place 1 gram vaginally at night for 2 weeks, then reduce to twice weekly (Patient not taking: Reported on 11/22/2017) 30 g 12     fluticasone (FLONASE) 50 MCG/ACT nasal spray Spray 2 sprays into both nostrils daily (Patient not taking: Reported on 11/22/2017) 16 g 1           Patient over age 50, mutual decision to screen reflected in health maintenance.      Pertinent mammograms are reviewed under the imaging tab.      Reviewed and updated as needed this visit by clinical staff  Tobacco  Allergies  Meds  Problems  Med Hx  Surg Hx  Fam Hx  Soc Hx          Reviewed and updated as needed this visit by Provider  Allergies  Meds  Problems            ROS:  C: NEGATIVE for fever, chills, change in weight  I: NEGATIVE for worrisome rashes, moles or lesions  E: NEGATIVE for vision changes or irritation  ENT: NEGATIVE for ear, mouth and throat problems  R: NEGATIVE for significant cough or SOB  B: NEGATIVE for masses, tenderness or discharge  CV: NEGATIVE for chest pain, palpitations or peripheral edema  GI: NEGATIVE for nausea, abdominal pain, heartburn, or change  "in bowel habits  : NEGATIVE for unusual urinary or vaginal symptoms. No vaginal bleeding.  M: NEGATIVE for significant arthralgias or myalgia  N: NEGATIVE for weakness, dizziness or paresthesias  P: NEGATIVE for changes in mood or affect     OBJECTIVE:   /75 (Cuff Size: Adult Regular)  Pulse 85  Temp 97.3  F (36.3  C) (Oral)  Ht 5' 10.25\" (1.784 m)  Wt 148 lb (67.1 kg)  LMP 07/22/2016  SpO2 100%  Breastfeeding? No  BMI 21.08 kg/m2  EXAM:  GENERAL APPEARANCE: healthy, alert and no distress  EYES: Eyes grossly normal to inspection, PERRL and conjunctivae and sclerae normal  HENT: ear canals and TM's normal, nose and mouth without ulcers or lesions, oropharynx clear and oral mucous membranes moist  NECK: no adenopathy, no asymmetry, masses, or scars and thyroid normal to palpation  RESP: lungs clear to auscultation - no rales, rhonchi or wheezes  BREAST: normal without masses, tenderness or nipple discharge and no palpable axillary masses or adenopathy  CV: regular rate and rhythm, normal S1 S2, no S3 or S4, no murmur, click or rub, no peripheral edema and peripheral pulses strong  ABDOMEN: soft, nontender, no hepatosplenomegaly, no masses and bowel sounds normal   (female): normal female external genitalia, normal urethral meatus, vaginal mucosal atrophy noted, normal cervix, adnexae, and uterus without masses or abnormal discharge  MS: no musculoskeletal defects are noted and gait is age appropriate without ataxia  SKIN: no suspicious lesions or rashes  NEURO: Normal strength and tone, sensory exam grossly normal, mentation intact and speech normal  PSYCH: mentation appears normal and affect normal/bright    EKG: nsr, no ischemic changes  ASSESSMENT/PLAN:   (Z00.00) Routine general medical examination at a health care facility  (primary encounter diagnosis)  Comment: prevetnive needs reivewed  Plan: Pap imaged thin layer screen with HPV -         recommended age 30 - 65 years (select HPV order       " " below), HPV High Risk Types DNA Cervical        see orders in Epic.     (N95.2) Atrophic vaginitis  Comment: stable   Plan: conjugated estrogens (PREMARIN) cream        Refill x 1 yr     (R06.09) MI (dyspnea on exertion)  Comment: worsening  Plan: EKG 12-lead complete w/read - Clinics, Exercise        Stress Echocardiogram        Obtain stress echo, if normal, consider spirometry due to past smoking  May be deconditioning but not improving despite continued exercise      COUNSELING:   Reviewed preventive health counseling, as reflected in patient instructions  Special attention given to:        Regular exercise       Healthy diet/nutrition         reports that she quit smoking about 22 years ago. She quit after 10.00 years of use. She has never used smokeless tobacco.    Estimated body mass index is 21.08 kg/(m^2) as calculated from the following:    Height as of this encounter: 5' 10.25\" (1.784 m).    Weight as of this encounter: 148 lb (67.1 kg).         Counseling Resources:  ATP IV Guidelines  Pooled Cohorts Equation Calculator  Breast Cancer Risk Calculator  FRAX Risk Assessment  ICSI Preventive Guidelines  Dietary Guidelines for Americans, 2010  USDA's MyPlate  ASA Prophylaxis  Lung CA Screening    Kiley Vick MD  [facility]  "

## 2017-11-22 NOTE — MR AVS SNAPSHOT
After Visit Summary   11/22/2017    Emily Fritz    MRN: [id number]           Patient Information     Date Of Birth          1965        Visit Information        Provider Department      11/22/2017 9:15 AM Kiley Vick MD [facility]        Today's Diagnoses     Routine general medical examination at a health care facility    -  1    Atrophic vaginitis        MI (dyspnea on exertion)          Care Instructions    Please  call [phone] to schedule your stress echo.     Preventive Health Recommendations  Female Ages 50 - 64    Yearly exam: See your health care provider every year in order to  o Review health changes.   o Discuss preventive care.    o Review your medicines if your doctor has prescribed any.      Get a Pap test every three years (unless you have an abnormal result and your provider advises testing more often).    If you get Pap tests with HPV test, you only need to test every 5 years, unless you have an abnormal result.     You do not need a Pap test if your uterus was removed (hysterectomy) and you have not had cancer.    You should be tested each year for STDs (sexually transmitted diseases) if you're at risk.     Have a mammogram every 1 to 2 years.    Have a colonoscopy at age 50, or have a yearly FIT test (stool test). These exams screen for colon cancer.      Have a cholesterol test every 5 years, or more often if advised.    Have a diabetes test (fasting glucose) every three years. If you are at risk for diabetes, you should have this test more often.     If you are at risk for osteoporosis (brittle bone disease), think about having a bone density scan (DEXA).    Shots: Get a flu shot each year. Get a tetanus shot every 10 years.    Nutrition:     Eat at least 5 servings of fruits and vegetables each day.    Eat whole-grain bread, whole-wheat pasta and brown rice instead of white grains and rice.    Talk to your provider about Calcium and Vitamin  D.     Lifestyle    Exercise at least 150 minutes a week (30 minutes a day, 5 days a week). This will help you control your weight and prevent disease.    Limit alcohol to one drink per day.    No smoking.     Wear sunscreen to prevent skin cancer.     See your dentist every six months for an exam and cleaning.    See your eye doctor every 1 to 2 years.            Follow-ups after your visit        Future tests that were ordered for you today     Open Future Orders        Priority Expected Expires Ordered    Exercise Stress Echocardiogram Routine  11/22/2018 11/22/2017            Who to contact     If you have questions or need follow up information about today's clinic visit or your schedule please contact [facility] directly at [phone].  Normal or non-critical lab and imaging results will be communicated to you by [employer], letter or phone within 4 business days after the clinic has received the results. If you do not hear from us within 7 days, please contact the clinic through [employer] or phone. If you have a critical or abnormal lab result, we will notify you by phone as soon as possible.  Submit refill requests through [employer] or call your pharmacy and they will forward the refill request to us. Please allow 3 business days for your refill to be completed.          Additional Information About Your Visit        [employer] Information     [employer] gives you secure access to your electronic health record. If you see a primary care provider, you can also send messages to your care team and make appointments. If you have questions, please call your primary care clinic.  If you do not have a primary care provider, please call [phone] and they will assist you.        Care EveryWhere ID     This is your Care EveryWhere ID. This could be used by other organizations to access your [place] medical records  [id number]        Your Vitals Were     Pulse Temperature Height Last Period Pulse Oximetry  "Breastfeeding?    85 97.3  F (36.3  C) (Oral) 5' 10.25\" (1.784 m) 07/22/2016 100% No    BMI (Body Mass Index)                   21.08 kg/m2            Blood Pressure from Last 3 Encounters:   11/22/17 119/75   07/13/17 120/79   09/23/16 133/87    Weight from Last 3 Encounters:   11/22/17 148 lb (67.1 kg)   07/13/17 149 lb (67.6 kg)   09/23/16 146 lb 9.6 oz (66.5 kg)              We Performed the Following     EKG 12-lead complete w/read - Clinics     HPV High Risk Types DNA Cervical     Pap imaged thin layer screen with HPV - recommended age 30 - 65 years (select HPV order below)          Where to get your medicines      These medications were sent to [id number] - [facility] [address],  [address] [facility]  [address], [place] [address]     Phone:  [phone]     conjugated estrogens cream          Primary Care Provider Office Phone # Fax #    Kiley Vick [phone] [address] [address] [facility] [address]        Equal Access to Services     KELECHI PEMBERTON : Jairo amayao Sozainabali, waaxda luqadaha, qaybta kaalmada adeegyada, manoj weller. So [facility] [phone].    ATENCIÓN: Si habla español, tiene a grider disposición servicios gratuitos de asistencia lingüística. Llame al [phone].    We comply with applicable federal civil rights laws and [place] laws. We do not discriminate on the basis of race, color, national origin, age, disability, sex, sexual orientation, or gender identity.            Thank you!     Thank you for choosing [facility]  for your care. Our goal is always to provide you with excellent care. Hearing back from our patients is one way we can continue to improve our services. Please take a few minutes to complete the written survey that you may receive in the mail after your visit with us. Thank you!             Your Updated Medication List - Protect others around " you: Learn how to safely use, store and throw away your medicines at www.disposemymeds.org.          This list is accurate as of: 11/22/17 10:22 AM.  Always use your most recent med list.                   Brand Name Dispense Instructions for use Diagnosis    azelastine 0.1 % spray    ASTELIN    3 Bottle    Spray 1-2 sprays into both nostrils 2 times daily    Chronic rhinitis, Dysfunction of Eustachian tube, bilateral, Adhesive otitis media, left       conjugated estrogens cream    PREMARIN    30 g    Place 1 g vaginally See Admin Instructions Place 1 gram vaginally at night for 2 weeks, then reduce to twice weekly    Atrophic vaginitis       [employer] PURELAX powder   Generic drug:  polyethylene glycol           fluticasone 50 MCG/ACT spray    FLONASE    16 g    Spray 2 sprays into both nostrils daily    Seasonal allergic rhinitis       omeprazole 20 MG CR capsule    priLOSEC    90 capsule    Take 1 capsule (20 mg) by mouth daily    Gastroesophageal reflux disease without esophagitis

## 2017-11-22 NOTE — PATIENT INSTRUCTIONS
Please  call [phone] to schedule your stress echo.     Preventive Health Recommendations  Female Ages 50 - 64    Yearly exam: See your health care provider every year in order to  o Review health changes.   o Discuss preventive care.    o Review your medicines if your doctor has prescribed any.      Get a Pap test every three years (unless you have an abnormal result and your provider advises testing more often).    If you get Pap tests with HPV test, you only need to test every 5 years, unless you have an abnormal result.     You do not need a Pap test if your uterus was removed (hysterectomy) and you have not had cancer.    You should be tested each year for STDs (sexually transmitted diseases) if you're at risk.     Have a mammogram every 1 to 2 years.    Have a colonoscopy at age 50, or have a yearly FIT test (stool test). These exams screen for colon cancer.      Have a cholesterol test every 5 years, or more often if advised.    Have a diabetes test (fasting glucose) every three years. If you are at risk for diabetes, you should have this test more often.     If you are at risk for osteoporosis (brittle bone disease), think about having a bone density scan (DEXA).    Shots: Get a flu shot each year. Get a tetanus shot every 10 years.    Nutrition:     Eat at least 5 servings of fruits and vegetables each day.    Eat whole-grain bread, whole-wheat pasta and brown rice instead of white grains and rice.    Talk to your provider about Calcium and Vitamin D.     Lifestyle    Exercise at least 150 minutes a week (30 minutes a day, 5 days a week). This will help you control your weight and prevent disease.    Limit alcohol to one drink per day.    No smoking.     Wear sunscreen to prevent skin cancer.     See your dentist every six months for an exam and cleaning.    See your eye doctor every 1 to 2 years.

## 2017-11-27 ENCOUNTER — TELEPHONE (OUTPATIENT)
Dept: CARDIOLOGY | Facility: CLINIC | Age: 52
End: 2017-11-27

## 2017-11-27 NOTE — TELEPHONE ENCOUNTER
Called patient prior to stress echo scheduled for 11/29 to discuss necessary preparation for test and assess for questions/concerns.  Reminded patient to remain NPO except water for at least 3 hours prior to test, take all medications as usual, to avoid caffeine and nicotine for 12 hr (including chocolate, decaf, Excedrin) and to wear comfortable clothing and shoes. Also informed patient that procedure is on a recumbent bike rather than treadmill. All questions answered, patient verbalized understanding.

## 2017-11-28 LAB
COPATH REPORT: NORMAL
PAP: NORMAL

## 2017-11-29 ENCOUNTER — RADIANT APPOINTMENT (OUTPATIENT)
Dept: CARDIOLOGY | Facility: CLINIC | Age: 52
End: 2017-11-29
Attending: FAMILY MEDICINE
Payer: COMMERCIAL

## 2017-11-29 VITALS — HEART RATE: 70 BPM | SYSTOLIC BLOOD PRESSURE: 157 MMHG | DIASTOLIC BLOOD PRESSURE: 96 MMHG

## 2017-11-29 DIAGNOSIS — R06.09 DOE (DYSPNEA ON EXERTION): ICD-10-CM

## 2017-11-29 DIAGNOSIS — R93.1 EQUIVOCAL STRESS ECHOCARDIOGRAPHY: Primary | ICD-10-CM

## 2017-11-29 LAB
FINAL DIAGNOSIS: NORMAL
HPV HR 12 DNA CVX QL NAA+PROBE: NEGATIVE
HPV16 DNA SPEC QL NAA+PROBE: NEGATIVE
HPV18 DNA SPEC QL NAA+PROBE: NEGATIVE
SPECIMEN DESCRIPTION: NORMAL

## 2017-11-29 PROCEDURE — 93018 CV STRESS TEST I&R ONLY: CPT | Performed by: INTERNAL MEDICINE

## 2017-11-29 PROCEDURE — 93321 DOPPLER ECHO F-UP/LMTD STD: CPT | Mod: 26 | Performed by: INTERNAL MEDICINE

## 2017-11-29 PROCEDURE — 93352 ADMIN ECG CONTRAST AGENT: CPT

## 2017-11-29 PROCEDURE — 93350 STRESS TTE ONLY: CPT | Mod: 26 | Performed by: INTERNAL MEDICINE

## 2017-11-29 PROCEDURE — 93017 CV STRESS TEST TRACING ONLY: CPT

## 2017-11-29 PROCEDURE — 93325 DOPPLER ECHO COLOR FLOW MAPG: CPT | Mod: TC

## 2017-11-29 PROCEDURE — 93350 STRESS TTE ONLY: CPT | Mod: TC

## 2017-11-29 PROCEDURE — 93016 CV STRESS TEST SUPVJ ONLY: CPT

## 2017-11-29 PROCEDURE — 93321 DOPPLER ECHO F-UP/LMTD STD: CPT | Mod: TC

## 2017-11-29 PROCEDURE — 93325 DOPPLER ECHO COLOR FLOW MAPG: CPT | Mod: 26 | Performed by: INTERNAL MEDICINE

## 2017-11-29 RX ADMIN — Medication 3 ML: at 14:54

## 2017-11-29 NOTE — NURSING NOTE
Patient presents today for stress echo ordered by MD. Prior to patient visit, chart prep done including confirmation of order, medications reviewed for contraindications, reviewed previous EKG's for trends & concerns and reviewed patient's medical history.     IV started in left AC with a 22G Jeclo Catheter.      Echo technician completed resting portion of echo.     Stress portion of echo completed utilizing bike and pictures taken at peak.  Blood pressure taken every 2 minutes and documented in Muse system.       Optison medication given 3 ml, wasted 6 ml  3ml Optison mixed with 6ml Saline - [id number]     Patient offered complaints of: short of breath, fatigue     After completion of stress echo, recovery period with blood pressure monitoring occurs at 1, 3 and 5 minutes and documented in Muse.  IV removed and water provided to patient.  Patient education provided about cardiology interpretation and primary provider will be notified of results.     Dr Gamboa provided supervision of the tests performed today.

## 2017-12-01 ENCOUNTER — PRE VISIT (OUTPATIENT)
Dept: CARDIOLOGY | Facility: CLINIC | Age: 52
End: 2017-12-01

## 2017-12-01 NOTE — TELEPHONE ENCOUNTER
Called and left message asking patient to call back to complete pre visit phone call.  Jen Romeo RN

## 2017-12-05 ENCOUNTER — OFFICE VISIT (OUTPATIENT)
Dept: CARDIOLOGY | Facility: CLINIC | Age: 52
End: 2017-12-05
Payer: COMMERCIAL

## 2017-12-05 VITALS
SYSTOLIC BLOOD PRESSURE: 134 MMHG | HEART RATE: 86 BPM | DIASTOLIC BLOOD PRESSURE: 86 MMHG | OXYGEN SATURATION: 99 % | WEIGHT: 148.2 LBS | BODY MASS INDEX: 21.11 KG/M2

## 2017-12-05 DIAGNOSIS — R06.09 DOE (DYSPNEA ON EXERTION): Primary | ICD-10-CM

## 2017-12-05 DIAGNOSIS — R00.2 PALPITATIONS: ICD-10-CM

## 2017-12-05 PROCEDURE — 99203 OFFICE O/P NEW LOW 30 MIN: CPT | Mod: 25 | Performed by: INTERNAL MEDICINE

## 2017-12-05 ASSESSMENT — PAIN SCALES - GENERAL: PAINLEVEL: MILD PAIN (2)

## 2017-12-05 NOTE — NURSING NOTE
"Emily Fritz's goals for this visit include:   Chief Complaint   Patient presents with     Consult     Abnormal stress test       She requests these members of her care team be copied on today's visit information: PCP    PCP: Kiley Vick    Referring Provider:  No referring provider defined for this encounter.    Chief Complaint   Patient presents with     Consult     Abnormal stress test       Initial /86 (BP Location: Left arm, Patient Position: Chair, Cuff Size: Adult Regular)  Pulse 86  Wt 67.2 kg (148 lb 3.2 oz)  LMP 07/22/2016  SpO2 99%  BMI 21.11 kg/m2 Estimated body mass index is 21.11 kg/(m^2) as calculated from the following:    Height as of 11/22/17: 1.784 m (5' 10.25\").    Weight as of this encounter: 67.2 kg (148 lb 3.2 oz).  Medication Reconciliation: complete       Medication Refills: none        Brianne Mendoza CMA        "

## 2017-12-05 NOTE — PROGRESS NOTES
2017            Kiley Vick MD   [facility]   [address] [facility], [place] [address]       RE:  Emily Fritz   MRN:  [id number]   :  1965      Dear Dr. Vick:      It was a pleasure participating in the care of your patient, Ms. Emily Fritz.  As you know, she is a 52-year-old lady who I see today for a nondiagnostic stress test.      Her past medical history is significant for the followin.  Gastroesophageal reflux disease with a hiatal hernia.   2.  Vitamin D deficiency.   3.  Allergic rhinitis.   4.  Cholelithiasis.      She denies having a significant history of cardiac disease.      In terms of his present symptom complex, apparently, since this summer, she has been experiencing exertional dyspnea.  Previously, she was exertionally unlimited; however, since this summer, she has felt that whenever she climbs 2 flights of stairs she will get quite short of breath and feels some heaviness in her chest.  It has gradually worsened since this summer.  She has not had rest symptoms, but feels that her exertional capacity is in fact limited.  She has also tried to perform some exercise on an exercise bike after this summer and she gets quite short of breath as well.  She feels some heaviness in the chest and occasionally some pressure to the face.      She also had some palpitations, last time on 10/21 when she was pushing a table getting ready for her son's wedding and felt her heart racing for about 20 minutes.      She otherwise denies gross PND, orthopnea or edema.  She denies gross syncope.      In terms of her cardiac risk factors, no history of diabetes or hypertension.  She quit smoking 20 years ago, smoked less than a pack a week for 15 years.  Drinks less than 1 drink a week.  Father had CHF in his 60s.  She does not know her cholesterol.      She is an  for a 's office.      CURRENT MEDICATIONS:  Include estrogens,  omeprazole.      PHYSICAL EXAMINATION:     VITAL SIGNS:  Her blood pressure is 130/80 with a pulse of 86.  Her weight is 148 pounds.   NECK:  Reveals no obvious jugular venous distention.   LUNGS:  Clear to auscultation.  Respiratory effort is normal.   CARDIAC:  Reveals a regular rate and rhythm, no obvious murmur or gallop appreciated.   ABDOMEN:  Belly soft, nontender.   EXTREMITIES:  Without gross edema.      Stress echo 11/29/2017, exercised for 4 minutes 22 seconds.  Resting EF 60-65 without gross valvular pathology.  Although no wall motion abnormalities were identified, there was no improvement in EF or LV size, but hypertensive blood pressure response to exercise nondiagnostic.      On 11/17/2014, potassium 4.2, GFR normal, hemoglobin 11.3.      EKG 11/20/2017 reveals normal sinus rhythm at a rate of 67 beats per minute, no gross ST changes.        IMPRESSION:      Emily is a 52-year-old lady without a prior documented history of cardiac disease who presents with 2 separate cardiac issues.     1.  Symptom-limiting exertional dyspnea.      She did have a stress echocardiogram 11/29/2017 that was nondiagnostic.  Echocardiogram images were technically difficult, and although no significant wall motion abnormalities were identified, there was lack of improvement of left ventricular ejection fraction in size, with a prominent hypertensive response to exercise.  Further noninvasive evaluation would be indicated.     2.  Palpitations.      She feels she has had episodes of her heart racing for 20 minutes on occasion and they start and stop spontaneously.        PLAN:     1.  Coronary CTA in order to rule out significant underlying coronary artery disease as a cause for her exertional dyspnea.     2.  A 30-day event monitor in order to rule out significant malignant arrhythmias.     3.  Further updates will follow pending the above test results.      Once again, it was a pleasure participating in the care of  your patient, Ms. Magen Fritz.  Please feel free to contact me anytime if you have any questions regarding her care in the future.         Sincerely,      MAURO TRAMMELL MD             D: 2017 09:24   T: 2017 11:06   MT: ARTHUR      Name:     MAGEN FRITZ   MRN:      [id number]        Account:      [id number]   :      1965      Document: [id number]       cc: Kiley Vick MD

## 2017-12-05 NOTE — MR AVS SNAPSHOT
After Visit Summary   12/5/2017    Emily Fritz    MRN: [id number]           Patient Information     Date Of Birth          1965        Visit Information        Provider Department      12/5/2017 8:30 AM Daljit Weldon MD [facility]        Today's Diagnoses     MI (dyspnea on exertion)    -  1    Palpitations          Care Instructions      The following is a summary of your office visit:    Medications started today:none    Medications stopped today:none    Medication dose change: none    Nurse contact information: Jen Romeo RN  Cardiology Care Coordinator  [phone] Phone  [phone] Fax    Appointments made today: Coronary CTA and 30 day Event Monitor    Patient instructions: See next page for instructions. Follow up will be determined by results.      If you have had any blood work, imaging or other testing completed we will be in touch within 1-2 weeks regarding the results. If you have any questions, concerns or need to schedule a follow up, please contact us at [phone]. If you are needing refills please contact your pharmacy. For urgent after hour care please call the [facility] at [phone] or the [facility] at [phone] and ask to speak to the cardiologist on call.    It was a pleasure meeting with you today. Please let us know if there is anything else we can do for you so that we can be sure you are leaving completely satisfied with your care experience.     Your Cardiology Team at [facility]  RN Care Coordinator: Jen Decker              Report to the [facility] waiting room at [facility].        The day before the test drink extra water and also on the day of the test.  Nothing to eat or drink except water 3 hours prior.  No caffeine, smoking, or strenuous activity 12 hours before test.    You will need pre-treatment if you have allergies to  contrast dye or shellfish.   You will need to have a current creatinine level within 30 days if you are over the age of 69, diabetic, or have a history of kidney problems.   Testing takes about 15 min. Please allow 2 hours for test as you may need mediations to slow your heart rate for the test.  Your head is not enclosed.               Follow-ups after your visit        Your next 10 appointments already scheduled     Dec 06, 2017  8:00 AM CST   (Arrive by 7:45 AM)   CTA ANGIOGRAM CORONARY ARTERY with UUCT4, UU CV CT NURSE   [facility], [place], [place] ([facility], [facility])    [address] [address] [phone]           Please allow two hours for this test.  Follow the instructions below:   The day before your exam, drink extra fluids at least six 8-ounce glasses (unless your doctor wants you to restrict your fluids).   No caffeine and no smoking the day of the test.   Do not eat or drink for 3 hours before your exam. You may take your morning medicines with small sips of water.   You may have or need a blood test (creatinine test) within 30 days of your exam. Go to your clinic or Diagnostic Imaging Department for this test.   If you take Viagra, Levitra or Cialis, stop taking it for 48 hours before your test.   If you are diabetic and take oral hypoglycemics, do not take them on the day of your test. Also, wait 48 hours before re-starting metformin (Avandamet, Glucophage, Glucovance, Metaglip).   Do not take diuretics on day of the test. This includes Furosemide (Lasix), Torsemide, Bumetanide (Bumex), Metolazone (Zaroxolyn) and Hydrochlorothiazide.   Do not take NSAIDS on the day of the test. This includes ibuprofen (Advil or Motrin), Naproxen and Indomethacin.  Bring any scans or X-rays taken at other hospitals, if similar tests were done. Also bring a list of your medicines, including vitamins, minerals and over-the-counter drugs. It is safest to leave  personal items at home.  Be sure to tell your doctor:   If you have any allergies, including any reaction to contrast.   If there s any chance you are pregnant.   If you are breastfeeding.   If you have any special needs.  Please wear loose clothing, such as a sweat suit or jogging clothes. Avoid snaps, zippers and other metal. We may ask you to undress and put on a hospital gown.  If you have any questions, please call the Imaging Department where you will have your exam.            Dec 07, 2017  8:00 AM CST   Event Monitor with MG DEVICE RM, MG CV TECH   [facility] ([facility])    [id number] [place] [address] [phone]              Future tests that were ordered for you today     Open Future Orders        Priority Expected Expires Ordered    Radiologist Consult For Cardiology Routine 12/5/2017 12/5/2018 12/5/2017    Cardiac Event Monitor - Peds/Adult Routine  1/19/2018 12/5/2017    CTA Angiogram coronary artery Routine  12/5/2018 12/5/2017            Who to contact     If you have questions or need follow up information about today's clinic visit or your schedule please contact [facility] directly at [phone].  Normal or non-critical lab and imaging results will be communicated to you by [employer], letter or phone within 4 business days after the clinic has received the results. If you do not hear from us within 7 days, please contact the clinic through [employer] or phone. If you have a critical or abnormal lab result, we will notify you by phone as soon as possible.  Submit refill requests through "[employer]" or call your pharmacy and they will forward the refill request to us. Please allow 3 business days for your refill to be completed.          Additional Information About Your Visit        "[employer] Information     "[employer] gives you secure access to your electronic health record. If you see a primary care provider, you can also send  messages to your care team and make appointments. If you have questions, please call your primary care clinic.  If you do not have a primary care provider, please call [phone] and they will assist you.      [employer] is an electronic gateway that provides easy, online access to your medical records. With [employer], you can request a clinic appointment, read your test results, renew a prescription or communicate with your care team.     To access your existing account, please contact your [facility] or call [phone] for assistance.        Care EveryWhere ID     This is your Care EveryWhere ID. This could be used by other organizations to access your [place] medical records  [id number]        Your Vitals Were     Pulse Last Period Pulse Oximetry BMI (Body Mass Index)          86 07/22/2016 99% 21.11 kg/m2         Blood Pressure from Last 3 Encounters:   12/05/17 134/86   11/29/17 (!) 157/96   11/22/17 119/75    Weight from Last 3 Encounters:   12/05/17 67.2 kg (148 lb 3.2 oz)   11/22/17 67.1 kg (148 lb)   07/13/17 67.6 kg (149 lb)               Primary Care Provider Office Phone # Fax #    Kiley Vick [phone] [address] [facility] [address]        Equal Access to Services     KELECHI PEMBERTON AH: Hadii aad ku hadasho Soomaali, waaxda luqadaha, qaybta kaalmada adeegyada, waxay omeroin haysanya garcia . So [facility] [phone].    ATENCIÓN: Si habla español, tiene a grider disposición servicios gratuitos de asistencia lingüística. Llame al [phone].    We comply with applicable federal civil rights laws and [place] laws. We do not discriminate on the basis of race, color, national origin, age, disability, sex, sexual orientation, or gender identity.            Thank you!     Thank you for choosing [facility]  for your care. Our goal is always to provide you with excellent care. Hearing back from our patients is one  way we can continue to improve our services. Please take a few minutes to complete the written survey that you may receive in the mail after your visit with us. Thank you!             Your Updated Medication List - Protect others around you: Learn how to safely use, store and throw away your medicines at www.disposemymeds.org.          This list is accurate as of: 12/5/17  9:30 AM.  Always use your most recent med list.                   Brand Name Dispense Instructions for use Diagnosis    azelastine 0.1 % spray    ASTELIN    3 Bottle    Spray 1-2 sprays into both nostrils 2 times daily    Chronic rhinitis, Dysfunction of Eustachian tube, bilateral, Adhesive otitis media, left       conjugated estrogens cream    PREMARIN    30 g    Place 1 g vaginally See Admin Instructions Place 1 gram vaginally at night for 2 weeks, then reduce to twice weekly    Atrophic vaginitis       [employer] PURELAX powder   Generic drug:  polyethylene glycol           fluticasone 50 MCG/ACT spray    FLONASE    16 g    Spray 2 sprays into both nostrils daily    Seasonal allergic rhinitis       omeprazole 20 MG CR capsule    priLOSEC    90 capsule    Take 1 capsule (20 mg) by mouth daily    Gastroesophageal reflux disease without esophagitis

## 2017-12-06 ENCOUNTER — HOSPITAL ENCOUNTER (OUTPATIENT)
Dept: CT IMAGING | Facility: CLINIC | Age: 52
Discharge: HOME | End: 2017-12-06
Attending: INTERNAL MEDICINE | Admitting: INTERNAL MEDICINE
Payer: COMMERCIAL

## 2017-12-06 ENCOUNTER — TELEPHONE (OUTPATIENT)
Dept: CARDIOLOGY | Facility: CLINIC | Age: 52
End: 2017-12-06

## 2017-12-06 VITALS — SYSTOLIC BLOOD PRESSURE: 149 MMHG | HEART RATE: 78 BPM | DIASTOLIC BLOOD PRESSURE: 81 MMHG | RESPIRATION RATE: 16 BRPM

## 2017-12-06 DIAGNOSIS — R06.09 DOE (DYSPNEA ON EXERTION): ICD-10-CM

## 2017-12-06 PROCEDURE — 75574 CT ANGIO HRT W/3D IMAGE: CPT

## 2017-12-06 PROCEDURE — 25000132 ZZH RX MED GY IP 250 OP 250 PS 637: Performed by: INTERNAL MEDICINE

## 2017-12-06 PROCEDURE — 25000128 H RX IP 250 OP 636: Performed by: INTERNAL MEDICINE

## 2017-12-06 PROCEDURE — 25000125 ZZHC RX 250: Performed by: INTERNAL MEDICINE

## 2017-12-06 PROCEDURE — 75574 CT ANGIO HRT W/3D IMAGE: CPT | Mod: 26 | Performed by: INTERNAL MEDICINE

## 2017-12-06 RX ORDER — METOPROLOL TARTRATE 1 MG/ML
5-15 INJECTION, SOLUTION INTRAVENOUS
Status: DISCONTINUED | OUTPATIENT
Start: 2017-12-06 | End: 2017-12-07 | Stop reason: HOSPADM

## 2017-12-06 RX ORDER — NITROGLYCERIN 0.4 MG/1
0.4 TABLET SUBLINGUAL
Status: DISCONTINUED | OUTPATIENT
Start: 2017-12-06 | End: 2017-12-07 | Stop reason: HOSPADM

## 2017-12-06 RX ORDER — ACYCLOVIR 200 MG/1
0-1 CAPSULE ORAL
Status: DISCONTINUED | OUTPATIENT
Start: 2017-12-06 | End: 2017-12-07 | Stop reason: HOSPADM

## 2017-12-06 RX ORDER — METOPROLOL TARTRATE 50 MG
50-100 TABLET ORAL
Status: COMPLETED | OUTPATIENT
Start: 2017-12-06 | End: 2017-12-06

## 2017-12-06 RX ORDER — IOPAMIDOL 755 MG/ML
215 INJECTION, SOLUTION INTRAVASCULAR ONCE
Status: COMPLETED | OUTPATIENT
Start: 2017-12-06 | End: 2017-12-06

## 2017-12-06 RX ADMIN — LIDOCAINE HYDROCHLORIDE 75 MG: 20 INJECTION, SOLUTION INTRAVENOUS at 09:22

## 2017-12-06 RX ADMIN — NITROGLYCERIN 0.4 MG: 0.4 TABLET SUBLINGUAL at 09:22

## 2017-12-06 RX ADMIN — METOROPROLOL TARTRATE 10 MG: 5 INJECTION, SOLUTION INTRAVENOUS at 09:16

## 2017-12-06 RX ADMIN — IOPAMIDOL 215 ML: 755 INJECTION, SOLUTION INTRAVENOUS at 10:46

## 2017-12-06 RX ADMIN — METOPROLOL TARTRATE 100 MG: 100 TABLET, FILM COATED ORAL at 07:54

## 2017-12-06 NOTE — TELEPHONE ENCOUNTER
Clinic Action Needed:No  Reason for Call: Elsa had a CTA Angiogram today and they gave her double contrast die as they were having difficulty with the images.  She is c/o headache and nausea and is having a hard time pushing fluids to flush out contrast dye.  Called [facility] and spoke to Cardiology RN Jen.  She advised that she isn't aware that double die would affect someone like that, however it's possible. Advised Emily to go slow and steady with fluids, if she isn't able to tolerate PO fluids or if Tylenol is not controlling headache, or if she is just not feeling well got into ER for further evaluation.  Elsa appears to understand directives and agrees with plan.   Routed to: Not routed.    Airam Ames RN  [facility]

## 2017-12-07 ENCOUNTER — ALLIED HEALTH/NURSE VISIT (OUTPATIENT)
Dept: CARDIOLOGY | Facility: CLINIC | Age: 52
End: 2017-12-07
Attending: INTERNAL MEDICINE
Payer: COMMERCIAL

## 2017-12-07 DIAGNOSIS — R00.2 PALPITATIONS: ICD-10-CM

## 2017-12-07 PROCEDURE — 93270 REMOTE 30 DAY ECG REV/REPORT: CPT

## 2017-12-07 PROCEDURE — 93272 ECG/REVIEW INTERPRET ONLY: CPT

## 2017-12-07 NOTE — PROGRESS NOTES
"Patient presents for event heart monitor placement ordered by MD.  Patient was hooked up and patient instructions were given regarding electrode placement and how to use phone.  Patient was instructed on how long to wear monitor, how to send a \"manual event\", and how to properly send the unit back. [employer] contact information was provided to patient. Patient education provided about cardiology interpretation and primary provider will be notified of results.    Diagnosis taken from MD order.    Loree Samson, CCT, Cardiac CMA    "

## 2017-12-07 NOTE — MR AVS SNAPSHOT
MRN:[id number]                      After Visit Summary   12/7/2017    Emily Fritz    MRN: [id number]           Visit Information        Provider Department      12/7/2017 8:30 AM MG CV TECH; MG DEVICE RM [facility]        MyChart Information     [employer] gives you secure access to your electronic health record. If you see a primary care provider, you can also send messages to your care team and make appointments. If you have questions, please call your primary care clinic.  If you do not have a primary care provider, please call [phone] and they will assist you.      [employer] is an electronic gateway that provides easy, online access to your medical records. With [employer], you can request a clinic appointment, read your test results, renew a prescription or communicate with your care team.     To access your existing account, please contact your [facility] or call [phone] for assistance.        Care EveryWhere ID     This is your Care EveryWhere ID. This could be used by other organizations to access your [place] medical records  [id number]        Equal Access to Services     KELECHI PEMBERTON : Hadii jose antonio sandoval hadasho Sozainabali, waaxda luqadaha, qaybta kaalmada adeandrew, manoj weller. So [facility] [phone].    ATENCIÓN: Si habla español, tiene a grider disposición servicios gratuitos de asistencia lingüística. Llame al [phone].    We comply with applicable federal civil rights laws and [place] laws. We do not discriminate on the basis of race, color, national origin, age, disability, sex, sexual orientation, or gender identity.

## 2017-12-11 ENCOUNTER — TELEPHONE (OUTPATIENT)
Dept: CARDIOLOGY | Facility: CLINIC | Age: 52
End: 2017-12-11

## 2017-12-11 LAB — RADIOLOGIST FLAGS: NORMAL

## 2017-12-11 NOTE — TELEPHONE ENCOUNTER
[facility] Call Center    Phone Message    Name of Caller: Sera    Phone Number: Other phone number:  [phone]    Best time to return call: ANy    May a detailed message be left on voicemail: yes    Relation to patient: Other Name: [employer] [employer]   Relationship:   Is there legal documentation in chart to discuss information with this person: Yes. Legal Documentation is verified by 3rd Party.      Reason for Call: Other: Patients CT on 12/0617, requesting to follow up regarding Impression 1 findings: Two 2 mm pulmonary nodules in the right middle lobe.  Please advise     Action Taken: Message routed to:  Adult Clinics: Cardiology p [id number]

## 2017-12-12 NOTE — TELEPHONE ENCOUNTER
Called and spoke to patient. She has seen the CTA results but the the radiology part. Informed she should follow up with primary care for the pulmonary nodule. Stated she knew she had one in the past. Dr Weldon would like her to come in and discuss findings of CTA. She is agreeable so appointment was made for 12/14/17

## 2017-12-14 ENCOUNTER — OFFICE VISIT (OUTPATIENT)
Dept: CARDIOLOGY | Facility: CLINIC | Age: 52
End: 2017-12-14
Payer: COMMERCIAL

## 2017-12-14 VITALS — SYSTOLIC BLOOD PRESSURE: 116 MMHG | OXYGEN SATURATION: 98 % | DIASTOLIC BLOOD PRESSURE: 77 MMHG | HEART RATE: 75 BPM

## 2017-12-14 DIAGNOSIS — R06.09 DOE (DYSPNEA ON EXERTION): Primary | ICD-10-CM

## 2017-12-14 PROCEDURE — 99214 OFFICE O/P EST MOD 30 MIN: CPT | Performed by: INTERNAL MEDICINE

## 2017-12-14 NOTE — PATIENT INSTRUCTIONS
The following is a summary of your office visit:    Medications started today:none    Medications stopped today:none    Medication dose change: none     Nurse contact information: Jen Romeo RN  Cardiology Care Coordinator  [phone] Phone  [phone] Fax    Appointments made today: none    Patient instructions: Call with any questions or concerns      If you have had any blood work, imaging or other testing completed we will be in touch within 1-2 weeks regarding the results. If you have any questions, concerns or need to schedule a follow up, please contact us at [phone]. If you are needing refills please contact your pharmacy. For urgent after hour care please call the [place] [facility] at [phone] or the [facility] at [phone] and ask to speak to the cardiologist on call.    It was a pleasure meeting with you today. Please let us know if there is anything else we can do for you so that we can be sure you are leaving completely satisfied with your care experience.     Your Cardiology Team at [facility]  RN Care Coordinator: Jen GUSMAN: Brianne

## 2017-12-14 NOTE — MR AVS SNAPSHOT
After Visit Summary   12/14/2017    Emily Fritz    MRN: [id number]           Patient Information     Date Of Birth          1965        Visit Information        Provider Department      12/14/2017 11:00 AM Daljit Weldon MD [facility]        Today's Diagnoses     MI (dyspnea on exertion)    -  1      Care Instructions      The following is a summary of your office visit:    Medications started today:none    Medications stopped today:none    Medication dose change: none     Nurse contact information: Jen Romeo RN  Cardiology Care Coordinator  [phone] Phone  [phone] Fax    Appointments made today: none    Patient instructions: Call with any questions or concerns      If you have had any blood work, imaging or other testing completed we will be in touch within 1-2 weeks regarding the results. If you have any questions, concerns or need to schedule a follow up, please contact us at [phone]. If you are needing refills please contact your pharmacy. For urgent after hour care please call the [place] [facility] at [phone] or the [facility] at [phone] and ask to speak to the cardiologist on call.    It was a pleasure meeting with you today. Please let us know if there is anything else we can do for you so that we can be sure you are leaving completely satisfied with your care experience.     Your Cardiology Team at [facility]  RN Care Coordinator: Jen Decker                    Follow-ups after your visit        Who to contact     If you have questions or need follow up information about today's clinic visit or your schedule please contact [facility] directly at [phone].  Normal or non-critical lab and imaging results will be communicated to you by MyChart, letter or phone within 4 business days after the clinic has received the results. If you do not hear from us  within 7 days, please contact the clinic through [employer] or phone. If you have a critical or abnormal lab result, we will notify you by phone as soon as possible.  Submit refill requests through [employer] or call your pharmacy and they will forward the refill request to us. Please allow 3 business days for your refill to be completed.          Additional Information About Your Visit        [employer] Information     [employer] gives you secure access to your electronic health record. If you see a primary care provider, you can also send messages to your care team and make appointments. If you have questions, please call your primary care clinic.  If you do not have a primary care provider, please call [phone] and they will assist you.      [employer] is an electronic gateway that provides easy, online access to your medical records. With [employer], you can request a clinic appointment, read your test results, renew a prescription or communicate with your care team.     To access your existing account, please contact your [facility] or call [phone] for assistance.        Care EveryWhere ID     This is your Care EveryWhere ID. This could be used by other organizations to access your [place] medical records  [id number]        Your Vitals Were     Last Period                   07/22/2016            Blood Pressure from Last 3 Encounters:   12/06/17 149/81   12/05/17 134/86   11/29/17 (!) 157/96    Weight from Last 3 Encounters:   12/05/17 67.2 kg (148 lb 3.2 oz)   11/22/17 67.1 kg (148 lb)   07/13/17 67.6 kg (149 lb)              Today, you had the following     No orders found for display         Today's Medication Changes          These changes are accurate as of: 12/14/17 11:33 AM.  If you have any questions, ask your nurse or doctor.               Start taking these medicines.        Dose/Directions    Blood Pressure Monitor Kit   Used for:  MI (dyspnea on exertion)        Dose:  1  Device   1 Device daily   Quantity:  1 kit   Refills:  1            Where to get your medicines      These medications were sent to [facility]/pharmacy #[id number] - [place], [place] - [address]  AT COR[address] OF [facility]  [address] , [facility] [address]     Phone:  [phone]     Blood Pressure Monitor Kit                Primary Care Provider Office Phone # Fax #    Kiley Cayla Vick [phone] [address] [facility] [address]        Equal Access to Services     [facility]: Hadii aad ku hadasho Soomaali, waaxda luqadaha, qaybta kaalmada adeegyada, waxay omeroin hayaan adeglod garcia . So [facility] [phone].    ATENCIÓN: Si habla español, tiene a grider disposición servicios gratuitos de asistencia lingüística. [facility] [phone].    We comply with applicable federal civil rights laws and [place] laws. We do not discriminate on the basis of race, color, national origin, age, disability, sex, sexual orientation, or gender identity.            Thank you!     Thank you for choosing [facility]  for your care. Our goal is always to provide you with excellent care. Hearing back from our patients is one way we can continue to improve our services. Please take a few minutes to complete the written survey that you may receive in the mail after your visit with us. Thank you!             Your Updated Medication List - Protect others around you: Learn how to safely use, store and throw away your medicines at www.disposemymeds.org.          This list is accurate as of: 12/14/17 11:33 AM.  Always use your most recent med list.                   Brand Name Dispense Instructions for use Diagnosis    azelastine 0.1 % spray    ASTELIN    3 Bottle    Spray 1-2 sprays into both nostrils 2 times daily    Chronic rhinitis, Dysfunction of Eustachian tube, bilateral, Adhesive otitis media, left       Blood Pressure Monitor Kit     1 kit    1 Device daily    MI  (dyspnea on exertion)       conjugated estrogens cream    PREMARIN    30 g    Place 1 g vaginally See Admin Instructions Place 1 gram vaginally at night for 2 weeks, then reduce to twice weekly    Atrophic vaginitis       [employer] PURELAX powder   Generic drug:  polyethylene glycol           fluticasone 50 MCG/ACT spray    FLONASE    16 g    Spray 2 sprays into both nostrils daily    Seasonal allergic rhinitis       omeprazole 20 MG CR capsule    priLOSEC    90 capsule    Take 1 capsule (20 mg) by mouth daily    Gastroesophageal reflux disease without esophagitis

## 2017-12-14 NOTE — NURSING NOTE
"Emily Fritz's goals for this visit include:   Chief Complaint   Patient presents with     RECHECK     Follow up results       She requests these members of her care team be copied on today's visit information: PCP    PCP: Kiley Vick    Referring Provider:  No referring provider defined for this encounter.    Chief Complaint   Patient presents with     RECHECK     Follow up results       Initial /77 (BP Location: Left arm, Patient Position: Chair, Cuff Size: Adult Regular)  Pulse 75  LMP 07/22/2016  SpO2 98% Estimated body mass index is 21.11 kg/(m^2) as calculated from the following:    Height as of 11/22/17: 1.784 m (5' 10.25\").    Weight as of 12/5/17: 67.2 kg (148 lb 3.2 oz).  Medication Reconciliation: complete       Medication Refills: none      Brianne Mendoza CMA        "

## 2017-12-15 NOTE — PROGRESS NOTES
2017            Kiley Vick MD   [facility]   [address] [place]  [address]      RE:  EMILY FRITZ   :  1965   MR#:  [id number]      Dear Dr. Vick:      It was a pleasure participating in the care of your patient, Ms. Emily Fritz.  As you know, she is a 52-year-old lady who I see today after diagnostic testing.      Her past medical history is significant for the followin.  Gastroesophageal reflux disease with a hiatal hernia.   2.  Vitamin D deficiency.   3.  Allergic rhinitis.   4.  Cholelithiasis.      She denies having a significant history of cardiac disease.      In terms of her present symptom complex, I last saw her 2017, and she had been complaining of some exertional dyspnea when she climbed the stairs.  She was getting ready for his son's wedding in October and felt her heart racing for 20 minutes as well.  We had ordered a coronary CTA and a 30-day event monitor, and she presents today to follow up on coronary CTA results.      Her coronary CTA 2017 revealed probably normal proximal coronary arteries.  Bolus timing and heart rate were unable to be completely controlled, but severe proximal lesions apparently were ruled out.  The patient has not been exercising and is in the middle of wearing her monitor and has another 2 weeks to go.  She has not felt any further palpitations or gross symptomatology since then.      She otherwise denies new shortness of breath, chest pain, PND, orthopnea, edema, palpitations, syncope or near-syncope.      In terms of her present medications, she is takin.  Omeprazole .   2.  Azelastine.      PHYSICAL EXAMINATION:     VITAL SIGNS:  Her blood pressure is 134/86 with a pulse of 86.  Her weight is 148 pounds.   NECK:  Exam reveals no obvious jugular venous distention.   LUNGS:  Clear to auscultation.  Respiratory effort is normal.   CARDIAC:  Exam reveals a regular rate and  rhythm, no obvious murmur or gallop appreciated.   ABDOMEN:  Belly soft, nontender.   EXTREMITIES:  Without gross edema.      Her stress echo on 11/29/2017 revealed that she exercised for 4 minutes and 22 seconds.  Resting EF was 60% to 65% without gross valvular pathology.  Although no wall motion abnormalities were identified, no significant improvement in EF or size was noted, but a significant hypertensive blood pressure response to exercise was noted.      Coronary CTA 12/06/2017 reveals heart rate was difficult to control, suboptimal exam, suboptimal bolus timing.  However, proximal coronaries were likely not severely stenosed.      Labs 11/17/2014:  Potassium 4.2, GFR normal.        IMPRESSION:      Elsa is a 52-year-old lady without a prior documented history of cardiac disease who presents with 2 separate issues:     1.  Exertional dyspnea.      Stress echo 11/29/2017 was nondiagnostic, but suggestive of pathology as her Ejection fraction and left ventricular size did not improve appropriately.  However, she did have a prominent hypertensive response to exercise.  She did have a coronary computerized tomographic angiography on 12/06/2017 that was suboptimal and technically difficult; however, it did suggest that her coronaries were probably normal and that no significant severe stenoses were present in the proximal locations of the coronary territories, which is reassuring.     2.  Palpitations.      She had episodes of her heart racing for 20 minutes on occasion and stop and start spontaneously.  She has not had further episodes, but she is still in the middle of wearing her 30-day event monitor.        PLAN:     1.  Her coronary CTA results were reviewed with her today.  We discussed them together.  She felt reassured regarding the results.     2.  She will keep track of her blood pressures at home, both at rest and with exercise and during symptoms to see if she has a prominent abnormal hypertensive  response to exercise that might be causing her symptoms.  We did discuss right heart catheterization with exercise, and she wishes to avoid further invasive testing as such.     3.  Await results from 30-day event monitor.     4.  Further updates and followup can be determined pending the above test results.      Once again, it was a pleasure participating in the care of your patient, Ms. Emily Fritz.  Please feel free to contact me at any time if there are any questions regarding her care in the future.         Sincerely,      MAURO TRAMMELL MD             D: 2017 11:39   T: 2017 21:57   MT:       Name:     EMILY FRITZ   MRN:      [id number]        Account:      [id number]   :      1965      Document: [id number]       cc: Kiley Vick MD

## 2017-12-20 ENCOUNTER — TELEPHONE (OUTPATIENT)
Dept: CARDIOLOGY | Facility: CLINIC | Age: 52
End: 2017-12-20

## 2017-12-20 NOTE — TELEPHONE ENCOUNTER
Notified by [facility] pharmacy that patient's insurance does not cover blood pressure units. She purchased one on [employer]. Reminded to check and if it is high with symptoms and exercise to call clinic and will report to Dr Weldon. She understood.

## 2018-02-23 ENCOUNTER — OFFICE VISIT (OUTPATIENT)
Dept: FAMILY MEDICINE | Facility: CLINIC | Age: 53
End: 2018-02-23
Payer: COMMERCIAL

## 2018-02-23 ENCOUNTER — RADIANT APPOINTMENT (OUTPATIENT)
Dept: GENERAL RADIOLOGY | Facility: CLINIC | Age: 53
End: 2018-02-23
Attending: PHYSICIAN ASSISTANT
Payer: COMMERCIAL

## 2018-02-23 VITALS
BODY MASS INDEX: 21.65 KG/M2 | TEMPERATURE: 97.5 F | WEIGHT: 152 LBS | DIASTOLIC BLOOD PRESSURE: 75 MMHG | HEART RATE: 82 BPM | SYSTOLIC BLOOD PRESSURE: 116 MMHG

## 2018-02-23 DIAGNOSIS — M25.511 ACUTE PAIN OF RIGHT SHOULDER: Primary | ICD-10-CM

## 2018-02-23 PROCEDURE — 73030 X-RAY EXAM OF SHOULDER: CPT | Mod: RT

## 2018-02-23 PROCEDURE — 99213 OFFICE O/P EST LOW 20 MIN: CPT | Performed by: PHYSICIAN ASSISTANT

## 2018-02-23 ASSESSMENT — ENCOUNTER SYMPTOMS
GASTROINTESTINAL NEGATIVE: 1
RESPIRATORY NEGATIVE: 1
CARDIOVASCULAR NEGATIVE: 1
NEUROLOGICAL NEGATIVE: 1
EYES NEGATIVE: 1
CONSTITUTIONAL NEGATIVE: 1
PSYCHIATRIC NEGATIVE: 1

## 2018-02-23 NOTE — PROGRESS NOTES
SUBJECTIVE:   Emily Fritz is a 52 year old female presenting with a chief complaint of   Chief Complaint   Patient presents with     Shoulder Pain     right shoulder   .    Onset of symptoms was 2 weeks ago - flare up, ongoing chronic pain.  Location: right shoulder  Context:       The injury happened while NA      Mechanism: NA      Patient experienced delayed pain  Course of symptoms is worsening.    Severity moderate  Current and Associated symptoms: Pain, Decreased range of motion and Stiffness  Denies  Swelling, Bruising, Warmth and Redness  Aggravating Factors: sleeping, ROM, putting on coat  Therapies to improve symptoms include: heat, ibuprofen and Tylenol  This is not the first time this type of problem has occurred for this patient, but has not been seen for it in past, home remedies were helping.    She has had chronic intermittent shoulder pain for the past few years  This flare is much worse - putting on her coat is painful  It is tender to the touch laterally  The whole arm feels heavy and stiff, no weakness  Reaching in the back seat is very painful  Heat helps some, sleep is difficult - sometimes wakes her up at night  No injury    Review of Systems   Constitutional: Negative.    HENT: Negative.    Eyes: Negative.    Respiratory: Negative.    Cardiovascular: Negative.    Gastrointestinal: Negative.    Genitourinary: Negative.    Musculoskeletal:        As in HPI   Skin: Negative.    Neurological: Negative.    Endo/Heme/Allergies: Negative.    Psychiatric/Behavioral: Negative.          Past Medical History:   Diagnosis Date     Allergic rhinitis due to animal dander      Chronic constipation      Diagnostic skin and sensitization tests 2/13/12 skin tests pos. for: dog(+)/DM/CR     Family history of breast cancer in mother 12/10/2014     Family history of colonic polyps 12/10/2014     Family history of colonic polyps 12/10/2014     Heart palpitations 3/2009    Holter     House dust mite allergy       Lateral epicondylitis 3/28/2012     Rhinitis, allergic to other allergen      Current Outpatient Prescriptions   Medication Sig Dispense Refill     Blood Pressure Monitor KIT 1 Device daily 1 kit 1     azelastine (ASTELIN) 0.1 % spray Spray 1-2 sprays into both nostrils 2 times daily 3 Bottle 0     omeprazole (PRILOSEC) 20 MG CR capsule Take 1 capsule (20 mg) by mouth daily 90 capsule 3     fluticasone (FLONASE) 50 MCG/ACT nasal spray Spray 2 sprays into both nostrils daily 16 g 1     conjugated estrogens (PREMARIN) cream Place 1 g vaginally See Admin Instructions Place 1 gram vaginally at night for 2 weeks, then reduce to twice weekly (Patient not taking: Reported on 2/23/2018) 30 g 12     [employer] PURELAX powder   6     Social History   Substance Use Topics     Smoking status: Former Smoker     Years: 10.00     Quit date: 1/1/1995     Smokeless tobacco: Never Used      Comment: lives in smoke free household     Alcohol use No       OBJECTIVE  /75  Pulse 82  Temp 97.5  F (36.4  C) (Oral)  Wt 152 lb (68.9 kg)  BMI 21.65 kg/m2    Physical Exam   Constitutional: She is oriented to person, place, and time and well-developed, well-nourished, and in no distress.   HENT:   Head: Normocephalic and atraumatic.   Neck: Normal range of motion. Neck supple.   Neurological: She is alert and oriented to person, place, and time.   Skin: Skin is warm and dry.   Psychiatric: Mood and affect normal.       Labs:  Results for orders placed or performed in visit on 02/23/18 (from the past 24 hour(s))   XR Shoulder Right G/E 3 Views    Narrative    XR SHOULDER RT G/E 3 VW 2/23/2018 12:19 PM    COMPARISON: None.    HISTORY: Acute right shoulder pain.      Impression    IMPRESSION: No fracture or dislocation seen in the right shoulder.  Joint spaces are preserved. No significant soft tissue swelling.    RANDALL SULLIVAN MD           ASSESSMENT:      ICD-10-CM    1. Acute pain of right shoulder M25.511 XR Shoulder Right G/E 3 Views      PHYSICAL THERAPY REFERRAL        Medical Decision Making:    Pain appears to be capsular (pain at end points of motion), but she has not lost any motion (not consistent with adhesive capsulitis).  Rotator cuff strength is normal.   Impingement signs are equivocal.     PLAN:    MS Injury/Pain  PT - could take 6-8 weeks to see improvement    Followup:    If not improving or if condition worsens, follow up with your Primary Care Provider, consider ortho follow up    There are no Patient Instructions on file for this visit.    Renae Dennis PA-C

## 2018-02-23 NOTE — MR AVS SNAPSHOT
"              After Visit Summary   2/23/2018    Emily Fritz    MRN: [id number]           Patient Information     Date Of Birth          1965        Visit Information        Provider Department      2/23/2018 11:40 AM Renae Dennis PA-C [facility]        Today's Diagnoses     Acute pain of right shoulder    -  1       Follow-ups after your visit        Additional Services     PHYSICAL THERAPY REFERRAL       *This therapy referral will be filtered to a centralized scheduling office at [facility] and the patient will receive a call to schedule an appointment at a [place] location most convenient for them. *     [facility] provides Physical Therapy evaluation and treatment and many specialty services across the [place] system.  If requesting a specialty program, please choose from the list below.    If you have not heard from the scheduling office within 2 business days, please call [phone] for all locations, with the exception of [place], please call [phone] and [facility], please call [phone]  Treatment: Evaluation & Treatment  Special Instructions/Modalities: PRN  Special Programs: Rotator strengthening, capsular stretches, scapular stabilization    Please be aware that coverage of these services is subject to the terms and limitations of your health insurance plan.  Call member services at your health plan with any benefit or coverage questions.      **Note to Provider:  If you are referring outside of [place] for the therapy appointment, please list the name of the location in the \"special instructions\" above, print the referral and give to the patient to schedule the appointment.                  Who to contact     If you have questions or need follow up information about today's clinic visit or your schedule please contact [facility] directly at [phone].  Normal or non-critical lab and imaging " results will be communicated to you by MyChart, letter or phone within 4 business days after the clinic has received the results. If you do not hear from us within 7 days, please contact the clinic through [employer] or phone. If you have a critical or abnormal lab result, we will notify you by phone as soon as possible.  Submit refill requests through [employer] or call your pharmacy and they will forward the refill request to us. Please allow 3 business days for your refill to be completed.          Additional Information About Your Visit        [employer] Information     [employer] gives you secure access to your electronic health record. If you see a primary care provider, you can also send messages to your care team and make appointments. If you have questions, please call your primary care clinic.  If you do not have a primary care provider, please call [phone] and they will assist you.        Care EveryWhere ID     This is your Care EveryWhere ID. This could be used by other organizations to access your [facility] medical records  [id number]        Your Vitals Were     Pulse Temperature BMI (Body Mass Index)             82 97.5  F (36.4  C) (Oral) 21.65 kg/m2          Blood Pressure from Last 3 Encounters:   02/23/18 116/75   12/14/17 116/77   12/06/17 149/81    Weight from Last 3 Encounters:   02/23/18 152 lb (68.9 kg)   12/05/17 148 lb 3.2 oz (67.2 kg)   11/22/17 148 lb (67.1 kg)              We Performed the Following     PHYSICAL THERAPY REFERRAL     XR Shoulder Right G/E 3 Views        Primary Care Provider Office Phone # Fax #    Kiley Cayla Vick [phone] [address] [facility] [address]        Equal Access to Services     KRISTY PEMBERTON : Hadii jose antonio Lopez, dusty jacome, manoj chaudhary. So [facility] [phone].    ATENCIÓN: Si habla español, tiene a grider disposición servicios gratuitos de asistencia lingüística.  Gabriela shaw [phone].    We comply with applicable federal civil rights laws and [place] laws. We do not discriminate on the basis of race, color, national origin, age, disability, sex, sexual orientation, or gender identity.            Thank you!     Thank you for choosing [facility]  [facility]. Our goal is always to provide you with excellent care. Hearing back from our patients is one way we can continue to improve our services. Please take a few minutes to complete the written survey that you may receive in the mail after your visit with us. Thank you!             Your Updated Medication List - Protect others around you: Learn how to safely use, store and throw away your medicines at www.disposemymeds.org.          This list is accurate as of 2/23/18 12:34 PM.  Always use your most recent med list.                   Brand Name Dispense Instructions for use Diagnosis    azelastine 0.1 % spray    ASTELIN    3 Bottle    Spray 1-2 sprays into both nostrils 2 times daily    Chronic rhinitis, Dysfunction of Eustachian tube, bilateral, Adhesive otitis media, left       Blood Pressure Monitor Kit     1 kit    1 Device daily    MI (dyspnea on exertion)       conjugated estrogens cream    PREMARIN    30 g    Place 1 g vaginally See Admin Instructions Place 1 gram vaginally at night for 2 weeks, then reduce to twice weekly    Atrophic vaginitis       [employer] PURELAX powder   Generic drug:  polyethylene glycol           fluticasone 50 MCG/ACT spray    FLONASE    16 g    Spray 2 sprays into both nostrils daily    Seasonal allergic rhinitis       omeprazole 20 MG CR capsule    priLOSEC    90 capsule    Take 1 capsule (20 mg) by mouth daily    Gastroesophageal reflux disease without esophagitis

## 2018-03-01 ENCOUNTER — THERAPY VISIT (OUTPATIENT)
Dept: PHYSICAL THERAPY | Facility: CLINIC | Age: 53
End: 2018-03-01
Payer: COMMERCIAL

## 2018-03-01 DIAGNOSIS — M25.511 PAIN IN JOINT OF RIGHT SHOULDER: Primary | ICD-10-CM

## 2018-03-01 PROCEDURE — 97110 THERAPEUTIC EXERCISES: CPT | Mod: GP | Performed by: PHYSICAL THERAPIST

## 2018-03-01 PROCEDURE — 97161 PT EVAL LOW COMPLEX 20 MIN: CPT | Mod: GP | Performed by: PHYSICAL THERAPIST

## 2018-03-01 NOTE — PROGRESS NOTES
[facility] Initial Evaluation  Subjective:  Patient is a 52 year old female presenting with rehab right shoulder hpi. The history is provided by the patient. No  was used.   Emily Fritz is a 52 year old female with a right shoulder condition.  Condition occurred with:  Unknown cause.  Condition occurred: for unknown reasons.  This is a chronic condition  Patient reports onset of R shoulder pain a few years ago for no apparent reason.  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    Patient reports pain:  Other (over top).  Radiates to:  Cervical, upper arm and other (shoulder blade).  Pain is described as aching and sharp (constant ache, intermittent sharp pains) and is constant and reported as 3/10.  Associated symptoms:  Loss of motion/stiffness (neck). Pain is the same all the time.  Symptoms are exacerbated by using arm behind back, lying on extremity and other (reaching down and out to side for purse, putting arm in jacket, driving) and relieved by rest and heat (arm supported in next to body).  Since onset symptoms are gradually worsening.  Special tests:  X-ray ((-)).      General health as reported by patient is good.  Pertinent medical history includes:  Menopausal.  Medical allergies: no.  Other surgeries include:  Other (, gallbladder).  Current medications:  Other (Tylenol at night).  Current occupation is .  Patient is working in normal job without restrictions.  Primary job tasks include:  Prolonged sitting.    Barriers include:  None as reported by the patient.    Red flags:  None as reported by the patient.                        Objective:  System                   Shoulder Evaluation:  ROM:  AROM:  normal                            PROM:  normal                            Pain: ERP/tightness on R with flexion, abduction, and ER.    Strength:        Abduction:  Left: 5/5   Pain:-    Right: 5/5       Pain:-    Internal Rotation:  Left:5/5      Pain:-    Right: 5/5      Pain:-  External Rotation:   Left:5/5      Pain:-   Right:5/5      Pain:-/+              Special Tests:  Special tests assessed shoulder: Painful empty can on R.  Repeated movements:   ER at wall x10; stretches, NE, NE.  Horizontal add x10; stretches, B dec sx with reach down/back, less tightness at end range flexion.                                           [place] Cervical Evaluation    Posture:  Sitting: fair  Standing: fair    Wry Neck: nil  Correction of Posture: no effect    Movement Loss:  Protrusion (PRO): nil  Flexion (Flex): nil  Retraction (RET): nil  Extension (EXT): min and pain  Lateral Flexion Right (LF R): nil  Lateral Flexion Left (LF L): min  Rotation Right (ROT R): min and pain  Rotation Left (ROT L): min  Test Movements:        RET EXT: During: increases  After: no worse    Repeat RET EXT: During: increases  After: better  Mechanical Response: IncROM                                                                   ROS    Assessment/Plan:    Patient is a 52 year old female with right side shoulder complaints.    Patient has the following significant findings with corresponding treatment plan.                Diagnosis 1:  R shoulder pain  Pain -  manual therapy, directional preference exercise and home program  Decreased ROM/flexibility - manual therapy, therapeutic exercise and home program  Decreased function - therapeutic activities and home program    Therapy Evaluation Codes:   1) History comprised of:   Personal factors that impact the plan of care:      None.    Comorbidity factors that impact the plan of care are:      None.     Medications impacting care: None.  2) Examination of Body Systems comprised of:   Body structures and functions that impact the plan of care:      Shoulder.   Activity limitations that impact the plan of care are:      Dressing and Lifting.  3) Clinical presentation characteristics  are:   Stable/Uncomplicated.  4) Decision-Making    Low complexity using standardized patient assessment instrument and/or measureable assessment of functional outcome.  Cumulative Therapy Evaluation is: Low complexity.    Previous and current functional limitations:  (See Goal Flow Sheet for this information)    Short term and Long term goals: (See Goal Flow Sheet for this information)     Communication ability:  Patient appears to be able to clearly communicate and understand verbal and written communication and follow directions correctly.  Treatment Explanation - The following has been discussed with the patient:   RX ordered/plan of care  Anticipated outcomes  Possible risks and side effects  This patient would benefit from PT intervention to resume normal activities.   Rehab potential is good.    Frequency:  1 X week, once daily  Duration:  for 8 weeks  Discharge Plan:  Achieve all LTG.  Independent in home treatment program.  Reach maximal therapeutic benefit.    Please refer to the daily flowsheet for treatment today, total treatment time and time spent performing 1:1 timed codes.

## 2018-03-01 NOTE — MR AVS SNAPSHOT
After Visit Summary   3/1/2018    Emily Fritz    MRN: [id number]           Patient Information     Date Of Birth          1965        Visit Information        Provider Department      3/1/2018 1:30 PM Nanci Carson, PT [facility]        Today's Diagnoses     Pain in joint of right shoulder    -  1       Follow-ups after your visit        Your next 10 appointments already scheduled     Mar 07, 2018  8:20 AM CST   KILO Extremity with Sade Tafoya, PT   [facility] ([facility]  )    [address]  [facility] [address]   [phone]            Mar 15, 2018  3:30 PM CDT   KILO Extremity with Nanci Carson, PT   [facility] ([facility]  )    [address]  [facility] [id number] [phone]            Mar 22, 2018  3:30 PM CDT   KILO Extremity with Nanci Carson, PT   [facility] ([facility]  )    [address]  [facility] [id number] [phone]              Who to contact     If you have questions or need follow up information about today's clinic visit or your schedule please contact [facility] directly at [phone].  Normal or non-critical lab and imaging results will be communicated to you by MyChart, letter or phone within 4 business days after the clinic has received the results. If you do not hear from us within 7 days, please contact the clinic through [employer] or phone. If you have a critical or abnormal lab result, we will notify you by phone as soon as possible.  Submit refill requests through [employer] or call your pharmacy and they will forward the refill request to us. Please allow 3 business days for your refill to be completed.          Additional Information About Your Visit        [employer] Information     [employer] gives you secure access to your  electronic health record. If you see a primary care provider, you can also send messages to your care team and make appointments. If you have questions, please call your primary care clinic.  If you do not have a primary care provider, please call [phone] and they will assist you.        Care EveryWhere ID     This is your Care EveryWhere ID. This could be used by other organizations to access your [place] medical records  [id number]         Blood Pressure from Last 3 Encounters:   02/23/18 116/75   12/14/17 116/77   12/06/17 149/81    Weight from Last 3 Encounters:   02/23/18 68.9 kg (152 lb)   12/05/17 67.2 kg (148 lb 3.2 oz)   11/22/17 67.1 kg (148 lb)              We Performed the Following     HC PT EVAL, LOW COMPLEXITY     KILO INITIAL EVAL REPORT     THERAPEUTIC EXERCISES        Primary Care Provider Office Phone # Fax #    Kiley Cayla Vick [phone] [address] [facility] [address]        Equal Access to Services     KRISTY [place]ON : Hadii aad ku hadasho Soomaali, waaxda luqadaha, qaybta kaalmada adeegyada, waxay esteban haynoahn aurora garcia . So [facility] [phone].    ATENCIÓN: Si habla español, tiene a grider disposición servicios gratuitos de asistencia lingüística. Llame al [phone].    We comply with applicable federal civil rights laws and [place] laws. We do not discriminate on the basis of race, color, national origin, age, disability, sex, sexual orientation, or gender identity.            Thank you!     Thank you for choosing [facility] THERAP[facility]  for your care. Our goal is always to provide you with excellent care. Hearing back from our patients is one way we can continue to improve our services. Please take a few minutes to complete the written survey that you may receive in the mail after your visit with us. Thank you!             Your Updated Medication List - Protect others around you: Learn how to safely use, store and  throw away your medicines at www.disposemymeds.org.          This list is accurate as of 3/1/18  5:32 PM.  Always use your most recent med list.                   Brand Name Dispense Instructions for use Diagnosis    azelastine 0.1 % spray    ASTELIN    3 Bottle    Spray 1-2 sprays into both nostrils 2 times daily    Chronic rhinitis, Dysfunction of Eustachian tube, bilateral, Adhesive otitis media, left       Blood Pressure Monitor Kit     1 kit    1 Device daily    MI (dyspnea on exertion)       conjugated estrogens cream    PREMARIN    30 g    Place 1 g vaginally See Admin Instructions Place 1 gram vaginally at night for 2 weeks, then reduce to twice weekly    Atrophic vaginitis       [employer] PURELAX powder   Generic drug:  polyethylene glycol           fluticasone 50 MCG/ACT spray    FLONASE    16 g    Spray 2 sprays into both nostrils daily    Seasonal allergic rhinitis       omeprazole 20 MG CR capsule    priLOSEC    90 capsule    Take 1 capsule (20 mg) by mouth daily    Gastroesophageal reflux disease without esophagitis

## 2018-03-07 ENCOUNTER — THERAPY VISIT (OUTPATIENT)
Dept: PHYSICAL THERAPY | Facility: CLINIC | Age: 53
End: 2018-03-07
Payer: COMMERCIAL

## 2018-03-07 DIAGNOSIS — M25.511 PAIN IN JOINT OF RIGHT SHOULDER: ICD-10-CM

## 2018-03-07 PROCEDURE — 97140 MANUAL THERAPY 1/> REGIONS: CPT | Mod: GP | Performed by: PHYSICAL THERAPIST

## 2018-03-07 PROCEDURE — 97110 THERAPEUTIC EXERCISES: CPT | Mod: GP | Performed by: PHYSICAL THERAPIST

## 2018-03-07 NOTE — MR AVS SNAPSHOT
After Visit Summary   3/7/2018    Emily Fritz    MRN: [id number]           Patient Information     Date Of Birth          1965        Visit Information        Provider Department      3/7/2018 8:20 AM Sade Tafoya, PT [facility]        Today's Diagnoses     Pain in joint of right shoulder           Follow-ups after your visit        Your next 10 appointments already scheduled     Mar 15, 2018  3:30 PM CDT   KILO Extremity with Nanci Carson, PT   [facility] ([facility]  )    [address]  [facility] [id number] [phone]            Mar 22, 2018  3:30 PM CDT   KILO Extremity with Nanci Carson, PT   [facility] ([facility]  )    [address]  [facility] [id number] [phone]              Who to contact     If you have questions or need follow up information about today's clinic visit or your schedule please contact [facility] directly at [phone].  Normal or non-critical lab and imaging results will be communicated to you by MyChart, letter or phone within 4 business days after the clinic has received the results. If you do not hear from us within 7 days, please contact the clinic through [employer] or phone. If you have a critical or abnormal lab result, we will notify you by phone as soon as possible.  Submit refill requests through [facility]. or call your pharmacy and they will forward the refill request to us. Please allow 3 business days for your refill to be completed.          Additional Information About Your Visit        MyChart Information     [facility]. gives you secure access to your electronic health record. If you see a primary care provider, you can also send messages to your care team and make appointments. If you have questions, please call your primary care clinic.  If you do not have a  primary care provider, please call [phone] and they will assist you.        Care EveryWhere ID     This is your Care EveryWhere ID. This could be used by other organizations to access your [place] medical records  [id number]         Blood Pressure from Last 3 Encounters:   02/23/18 116/75   12/14/17 116/77   12/06/17 149/81    Weight from Last 3 Encounters:   02/23/18 68.9 kg (152 lb)   12/05/17 67.2 kg (148 lb 3.2 oz)   11/22/17 67.1 kg (148 lb)              We Performed the Following     MANUAL THER TECH,1+REGIONS,EA 15 MIN     THERAPEUTIC EXERCISES        Primary Care Provider Office Phone # Fax #    Kiley Vick [phone] [address] [facility] [address]        Equal Access to Services     [facility] : Hadii aad ku hadasho Soomaali, waaxda luqadaha, qaybta kaalmada adeegyada, waxay omeroin hayaan adegold khjesús garcia . So [facility] [phone].    ATENCIÓN: Si habla español, tiene a grider disposición servicios gratuitos de asistencia lingüística. Gabriela al [phone].    We comply with applicable federal civil rights laws and [place] laws. We do not discriminate on the basis of race, color, national origin, age, disability, sex, sexual orientation, or gender identity.            Thank you!     Thank you for choosing [facility]ER[facility]  for your care. Our goal is always to provide you with excellent care. Hearing back from our patients is one way we can continue to improve our services. Please take a few minutes to complete the written survey that you may receive in the mail after your visit with us. Thank you!             Your Updated Medication List - Protect others around you: Learn how to safely use, store and throw away your medicines at www.disposemymeds.org.          This list is accurate as of 3/7/18  9:32 AM.  Always use your most recent med list.                   Brand Name Dispense Instructions for use Diagnosis    azelastine 0.1  % spray    ASTELIN    3 Bottle    Spray 1-2 sprays into both nostrils 2 times daily    Chronic rhinitis, Dysfunction of Eustachian tube, bilateral, Adhesive otitis media, left       Blood Pressure Monitor Kit     1 kit    1 Device daily    MI (dyspnea on exertion)       conjugated estrogens cream    PREMARIN    30 g    Place 1 g vaginally See Admin Instructions Place 1 gram vaginally at night for 2 weeks, then reduce to twice weekly    Atrophic vaginitis       [employer] PURELAX powder   Generic drug:  polyethylene glycol           fluticasone 50 MCG/ACT spray    FLONASE    16 g    Spray 2 sprays into both nostrils daily    Seasonal allergic rhinitis       omeprazole 20 MG CR capsule    priLOSEC    90 capsule    Take 1 capsule (20 mg) by mouth daily    Gastroesophageal reflux disease without esophagitis

## 2018-04-18 ENCOUNTER — MYC MEDICAL ADVICE (OUTPATIENT)
Dept: FAMILY MEDICINE | Facility: CLINIC | Age: 53
End: 2018-04-18

## 2018-04-18 DIAGNOSIS — M25.511 RIGHT SHOULDER PAIN, UNSPECIFIED CHRONICITY: Primary | ICD-10-CM

## 2018-04-20 NOTE — TELEPHONE ENCOUNTER
Per protocol, will route encounter to be cosigned by provider for Verbal Orders.  Toyin Ospina RN

## 2018-04-24 ENCOUNTER — TRANSFERRED RECORDS (OUTPATIENT)
Dept: HEALTH INFORMATION MANAGEMENT | Facility: CLINIC | Age: 53
End: 2018-04-24

## 2018-06-11 ENCOUNTER — TRANSFERRED RECORDS (OUTPATIENT)
Dept: HEALTH INFORMATION MANAGEMENT | Facility: CLINIC | Age: 53
End: 2018-06-11

## 2018-06-15 ENCOUNTER — TELEPHONE (OUTPATIENT)
Dept: FAMILY MEDICINE | Facility: CLINIC | Age: 53
End: 2018-06-15

## 2018-06-15 DIAGNOSIS — M25.511 CHRONIC RIGHT SHOULDER PAIN: Primary | ICD-10-CM

## 2018-06-15 DIAGNOSIS — G89.29 CHRONIC RIGHT SHOULDER PAIN: Primary | ICD-10-CM

## 2018-06-15 NOTE — TELEPHONE ENCOUNTER
Patient states she needs a referral to get an injection for the MRI scan she needs. Patient states to call [phone] to give a Referral.  She is going to [facility].

## 2018-06-22 ENCOUNTER — TRANSFERRED RECORDS (OUTPATIENT)
Dept: HEALTH INFORMATION MANAGEMENT | Facility: CLINIC | Age: 53
End: 2018-06-22

## 2018-09-05 ENCOUNTER — OFFICE VISIT (OUTPATIENT)
Dept: INTERNAL MEDICINE | Facility: CLINIC | Age: 53
End: 2018-09-05
Payer: COMMERCIAL

## 2018-09-05 VITALS
BODY MASS INDEX: 21.14 KG/M2 | SYSTOLIC BLOOD PRESSURE: 125 MMHG | DIASTOLIC BLOOD PRESSURE: 85 MMHG | WEIGHT: 151 LBS | HEART RATE: 78 BPM | OXYGEN SATURATION: 98 % | HEIGHT: 71 IN | TEMPERATURE: 97.5 F

## 2018-09-05 DIAGNOSIS — R39.89 URINE TROUBLES: Primary | ICD-10-CM

## 2018-09-05 LAB
ALBUMIN UR-MCNC: NEGATIVE MG/DL
AMORPH CRY #/AREA URNS HPF: ABNORMAL /HPF
APPEARANCE UR: ABNORMAL
BILIRUB UR QL STRIP: NEGATIVE
COLOR UR AUTO: YELLOW
GLUCOSE UR STRIP-MCNC: NEGATIVE MG/DL
HGB UR QL STRIP: NEGATIVE
KETONES UR STRIP-MCNC: NEGATIVE MG/DL
LEUKOCYTE ESTERASE UR QL STRIP: NEGATIVE
NITRATE UR QL: NEGATIVE
PH UR STRIP: 7.5 PH (ref 5–7)
RBC #/AREA URNS AUTO: ABNORMAL /HPF
SOURCE: ABNORMAL
SP GR UR STRIP: 1.01 (ref 1–1.03)
UROBILINOGEN UR STRIP-ACNC: 0.2 EU/DL (ref 0.2–1)
WBC #/AREA URNS AUTO: ABNORMAL /HPF

## 2018-09-05 PROCEDURE — 99213 OFFICE O/P EST LOW 20 MIN: CPT | Performed by: INTERNAL MEDICINE

## 2018-09-05 PROCEDURE — 81001 URINALYSIS AUTO W/SCOPE: CPT | Performed by: INTERNAL MEDICINE

## 2018-09-05 NOTE — NURSING NOTE
"Chief Complaint   Patient presents with     UTI       Initial /85  Pulse 78  Temp 97.5  F (36.4  C) (Oral)  Ht 5' 10.5\" (1.791 m)  Wt 151 lb (68.5 kg)  SpO2 98%  Breastfeeding? No  BMI 21.36 kg/m2 Estimated body mass index is 21.36 kg/(m^2) as calculated from the following:    Height as of this encounter: 5' 10.5\" (1.791 m).    Weight as of this encounter: 151 lb (68.5 kg).  Medication Reconciliation: complete    Josselyn Cevallos CMA      "

## 2018-09-05 NOTE — PATIENT INSTRUCTIONS
Your urine test did not show a urinary tract infection (UTI) today.  Please keep medications or beverages that can cause increase urinary frequency (urinating often) to a minimum.    If your symptoms are still not any better in a week, you may touch base with your PCP, Dr Vick, regarding restarting premarin (as sometimes, vaginal dryness associated with menopause could mimic a UTI).

## 2018-09-05 NOTE — MR AVS SNAPSHOT
After Visit Summary   9/5/2018    Emily Fritz    MRN: [id number]           Patient Information     Date Of Birth          1965        Visit Information        Provider Department      9/5/2018 3:00 PM Elizabeth Hay MD [facility]        Today's Diagnoses     Urine troubles    -  1      Care Instructions    Your urine test did not show a urinary tract infection (UTI) today.  Please keep medications or beverages that can cause increase urinary frequency (urinating often) to a minimum.    If your symptoms are still not any better in a week, you may touch base with your PCP, Dr Vick, regarding restarting premarin (as sometimes, vaginal dryness associated with menopause could mimic a UTI).              Follow-ups after your visit        Who to contact     If you have questions or need follow up information about today's clinic visit or your schedule please contact [facility] directly at [phone].  Normal or non-critical lab and imaging results will be communicated to you by MyChart, letter or phone within 4 business days after the clinic has received the results. If you do not hear from us within 7 days, please contact the clinic through [employer] or phone. If you have a critical or abnormal lab result, we will notify you by phone as soon as possible.  Submit refill requests through [employer] or call your pharmacy and they will forward the refill request to us. Please allow 3 business days for your refill to be completed.          Additional Information About Your Visit        MyChart Information     [employer] gives you secure access to your electronic health record. If you see a primary care provider, you can also send messages to your care team and make appointments. If you have questions, please call your primary care clinic.  If you do not have a primary care provider, please call [phone] and they will assist you.        Care EveryWhere ID      "This is your Care EveryWhere ID. This could be used by other organizations to access your [place] medical records  [id number]        Your Vitals Were     Pulse Temperature Height Pulse Oximetry Breastfeeding? BMI (Body Mass Index)    78 97.5  F (36.4  C) (Oral) 5' 10.5\" (1.791 m) 98% No 21.36 kg/m2       Blood Pressure from Last 3 Encounters:   09/05/18 125/85   02/23/18 116/75   12/14/17 116/77    Weight from Last 3 Encounters:   09/05/18 151 lb (68.5 kg)   02/23/18 152 lb (68.9 kg)   12/05/17 148 lb 3.2 oz (67.2 kg)              We Performed the Following     UA reflex to Microscopic and Culture     Urine Microscopic        Primary Care Provider Office Phone # Fax #    Kiley Cayla Vick [phone] [address] [facility] [address]        Equal Access to Services     KRISTY [facility] HospitalLISA : Hadii aad ku hadasho Soomaali, waaxda luqadaha, qaybta kaalmada adeegyada, manoj jamilin haysanya garcia . So [facility] [phone].    ATENCIÓN: Si amyla esposmar, tiene a grider disposición servicios gratuitos de asistencia lingüística. Llame al [phone].    We comply with applicable federal civil rights laws and [place] laws. We do not discriminate on the basis of race, color, national origin, age, disability, sex, sexual orientation, or gender identity.            Thank you!     Thank you for choosing [facility]  for your care. Our goal is always to provide you with excellent care. Hearing back from our patients is one way we can continue to improve our services. Please take a few minutes to complete the written survey that you may receive in the mail after your visit with us. Thank you!             Your Updated Medication List - Protect others around you: Learn how to safely use, store and throw away your medicines at www.disposemymeds.org.          This list is accurate as of 9/5/18  3:30 PM.  Always use your most recent med list.                   Brand Name Dispense " Instructions for use Diagnosis    azelastine 0.1 % nasal spray    ASTELIN    3 Bottle    Spray 1-2 sprays into both nostrils 2 times daily    Chronic rhinitis, Dysfunction of Eustachian tube, bilateral, Adhesive otitis media, left       Blood Pressure Monitor Kit     1 kit    1 Device daily    MI (dyspnea on exertion)       conjugated estrogens cream    PREMARIN    30 g    Place 1 g vaginally See Admin Instructions Place 1 gram vaginally at night for 2 weeks, then reduce to twice weekly    Atrophic vaginitis       [employer]S PURELAX powder   Generic drug:  polyethylene glycol           fluticasone 50 MCG/ACT spray    FLONASE    16 g    Spray 2 sprays into both nostrils daily    Seasonal allergic rhinitis       omeprazole 20 MG CR capsule    priLOSEC    90 capsule    Take 1 capsule (20 mg) by mouth daily    Gastroesophageal reflux disease without esophagitis

## 2018-09-05 NOTE — PROGRESS NOTES
SUBJECTIVE:   Emily Fritz is a 53 year old female who presents to clinic today for the following health issues:          URINARY TRACT SYMPTOMS      Duration: 2 weeks    Description  Frequency; dysuria.    Intensity:  mild    Accompanying signs and symptoms:  Fever/chills: no   Flank pain no   Nausea and vomiting: no   Vaginal symptoms: irritation; no abnormal vagina discharge  Abdominal/Pelvic Pain: YES-pain low down and maybe some vaginal burning    History  History of frequent UTI's: no   History of kidney stones: no   Sexually Active: YES  Possibility of pregnancy: No    Precipitating or alleviating factors: None    Therapies tried and outcome: none       Patient denies caffeinated drinking more fluids lately.   She may have been drinking more citrus beverages lately, as she was on vacation.   Patient used to use premarin cream-stopped it ~ 2 months ago, as she has not needed it.           Reviewed and updated as needed this visit by clinical staff  Tobacco  Allergies  Meds  Problems  Med Hx  Surg Hx  Fam Hx  Soc Hx        Reviewed and updated as needed this visit by Provider  Meds  Problems         Patient Active Problem List   Diagnosis     CARDIOVASCULAR SCREENING; LDL GOAL LESS THAN 160     Cholelithiasis     Diagnostic skin and sensitization tests     Allergic rhinitis due to animal dander     Rhinitis, allergic to other allergen     House dust mite allergy     Lateral epicondylitis     Vitamin D deficiency disease     Heart palpitations     Family history of colonic polyps     Family history of breast cancer in mother     Gastroesophageal reflux disease without esophagitis     Menorrhagia with regular cycle     Dry eyes     Pain in joint of right shoulder       Past Medical History:   Diagnosis Date     Allergic rhinitis due to animal dander      Chronic constipation      Diagnostic skin and sensitization tests 2/13/12 skin tests pos. for: dog(+)/DM/CR     Family history of breast cancer  in mother 12/10/2014     Family history of colonic polyps 12/10/2014     Family history of colonic polyps 12/10/2014     Heart palpitations 3/2009    Holter     House dust mite allergy      Lateral epicondylitis 3/28/2012     Rhinitis, allergic to other allergen        Past Surgical History:   Procedure Laterality Date     C/SECTION, LOW TRANSVERSE  1988     CHOLECYSTECTOMY  2010     DILATION AND CURETTAGE, HYSTEROSCOPY DIAGNOSTIC, COMBINED  8/25/16    Menometrorrhagia       Family History   Problem Relation Age of Onset     Diabetes Mother 78     Hypertension Mother      Thyroid Disease Mother      Cancer Mother 78     breast-mastectomy     Circulatory Mother      DVT     Breast Cancer Mother 79     Hypertension Father      Cerebrovascular Disease Father      HEART DISEASE Father      CHF     Hypertension Sister      Other Cancer Sister      Carcinoid Cancer     Breast Cancer Maternal Aunt      postmenopausal     Macular Degeneration Paternal Aunt      Breast Cancer Maternal Aunt      postmenopausal     GASTROINTESTINAL DISEASE Sister      gastric carcinoid tumors with mets to liver     Glaucoma No family hx of        Social History   Substance Use Topics     Smoking status: Former Smoker     Years: 10.00     Quit date: 1/1/1995     Smokeless tobacco: Never Used      Comment: lives in smoke free household     Alcohol use No       Current Outpatient Prescriptions   Medication     azelastine (ASTELIN) 0.1 % spray     CVS PURELAX powder     fluticasone (FLONASE) 50 MCG/ACT nasal spray     omeprazole (PRILOSEC) 20 MG CR capsule     Blood Pressure Monitor KIT     conjugated estrogens (PREMARIN) cream     No current facility-administered medications for this visit.          ROS:  Constitutional, HEENT, cardiovascular, pulmonary, GI, , musculoskeletal, neuro, skin, endocrine and psych systems are negative, except as otherwise noted.     OBJECTIVE:                                                    /85  Pulse 78   "Temp 97.5  F (36.4  C) (Oral)  Ht 5' 10.5\" (1.791 m)  Wt 151 lb (68.5 kg)  SpO2 98%  Breastfeeding? No  BMI 21.36 kg/m2     GENERAL APPEARANCE: healthy, alert and in no distress      ABDOMEN: soft, nontender, no hepatosplenomegaly, no masses and bowel sounds normal  MS: no musculoskeletal defects are noted and gait is age appropriate without ataxia  SKIN: no suspicious lesions or rashes  NEURO: mentation intact and speech normal  PSYCH: mentation appears normal and affect normal/bright.    Results for orders placed or performed in visit on 09/05/18   UA reflex to Microscopic and Culture   Result Value Ref Range    Color Urine Yellow     Appearance Urine Slightly Cloudy     Glucose Urine Negative NEG^Negative mg/dL    Bilirubin Urine Negative NEG^Negative    Ketones Urine Negative NEG^Negative mg/dL    Specific Gravity Urine 1.010 1.003 - 1.035    Blood Urine Negative NEG^Negative    pH Urine 7.5 (H) 5.0 - 7.0 pH    Protein Albumin Urine Negative NEG^Negative mg/dL    Urobilinogen Urine 0.2 0.2 - 1.0 EU/dL    Nitrite Urine Negative NEG^Negative    Leukocyte Esterase Urine Negative NEG^Negative    Source Midstream Urine    Urine Microscopic   Result Value Ref Range    WBC Urine 0 - 5 OTO5^0 - 5 /HPF    RBC Urine O - 2 OTO2^O - 2 /HPF    Amorphous Crystals Moderate (A) NEG^Negative /HPF       No results found for this or any previous visit (from the past 744 hour(s)).      ASSESSMENT/PLAN:                                                        ICD-10-CM    1. Urine troubles R39.89 UA reflex to Microscopic and Culture     Urine Microscopic       Patient Instructions   Your urine test did not show a urinary tract infection (UTI) today.  Please keep medications or beverages that can cause increase urinary frequency (urinating often) to a minimum.    If your symptoms are still not any better in a week, you may touch base with your PCP, Dr Vick, regarding restarting premarin (as sometimes, vaginal dryness " associated with menopause could mimic a UTI).          Elizabeth Hay MD    [facility]  [address] [facility] [address] [phone]

## 2018-09-11 ENCOUNTER — MYC MEDICAL ADVICE (OUTPATIENT)
Dept: FAMILY MEDICINE | Facility: CLINIC | Age: 53
End: 2018-09-11

## 2018-09-11 DIAGNOSIS — Z83.49 FAMILY HISTORY OF HEMOCHROMATOSIS: Primary | ICD-10-CM

## 2018-09-18 DIAGNOSIS — Z83.49 FAMILY HISTORY OF HEMOCHROMATOSIS: ICD-10-CM

## 2018-09-18 LAB
ALBUMIN SERPL-MCNC: 3.7 G/DL (ref 3.4–5)
ALP SERPL-CCNC: 96 U/L (ref 40–150)
ALT SERPL W P-5'-P-CCNC: 21 U/L (ref 0–50)
AST SERPL W P-5'-P-CCNC: 21 U/L (ref 0–45)
BILIRUB DIRECT SERPL-MCNC: 0.2 MG/DL (ref 0–0.2)
BILIRUB SERPL-MCNC: 0.6 MG/DL (ref 0.2–1.3)
FERRITIN SERPL-MCNC: 208 NG/ML (ref 8–252)
IRON SATN MFR SERPL: 83 % (ref 15–46)
IRON SERPL-MCNC: 199 UG/DL (ref 35–180)
PROT SERPL-MCNC: 7.2 G/DL (ref 6.8–8.8)
TIBC SERPL-MCNC: 241 UG/DL (ref 240–430)

## 2018-09-18 PROCEDURE — 83550 IRON BINDING TEST: CPT | Performed by: FAMILY MEDICINE

## 2018-09-18 PROCEDURE — 82728 ASSAY OF FERRITIN: CPT | Performed by: FAMILY MEDICINE

## 2018-09-18 PROCEDURE — 83540 ASSAY OF IRON: CPT | Performed by: FAMILY MEDICINE

## 2018-09-18 PROCEDURE — 80076 HEPATIC FUNCTION PANEL: CPT | Performed by: FAMILY MEDICINE

## 2018-09-18 PROCEDURE — 36415 COLL VENOUS BLD VENIPUNCTURE: CPT | Performed by: FAMILY MEDICINE

## 2018-09-21 ENCOUNTER — MYC MEDICAL ADVICE (OUTPATIENT)
Dept: FAMILY MEDICINE | Facility: CLINIC | Age: 53
End: 2018-09-21

## 2018-09-21 DIAGNOSIS — R79.0 SERUM IRON RAISED: ICD-10-CM

## 2018-09-21 DIAGNOSIS — Z83.49 FAMILY HISTORY OF HEMOCHROMATOSIS: Primary | ICD-10-CM

## 2018-10-02 ENCOUNTER — ONCOLOGY VISIT (OUTPATIENT)
Dept: ONCOLOGY | Facility: CLINIC | Age: 53
End: 2018-10-02
Payer: COMMERCIAL

## 2018-10-02 VITALS
SYSTOLIC BLOOD PRESSURE: 120 MMHG | WEIGHT: 154 LBS | TEMPERATURE: 97.9 F | DIASTOLIC BLOOD PRESSURE: 83 MMHG | BODY MASS INDEX: 21.56 KG/M2 | HEIGHT: 71 IN | RESPIRATION RATE: 18 BRPM | OXYGEN SATURATION: 100 % | HEART RATE: 80 BPM

## 2018-10-02 DIAGNOSIS — Z83.49 FAMILY HISTORY OF HEMOCHROMATOSIS: ICD-10-CM

## 2018-10-02 DIAGNOSIS — Z83.49 FAMILY HISTORY OF HEMOCHROMATOSIS: Primary | ICD-10-CM

## 2018-10-02 LAB
BASOPHILS # BLD AUTO: 0.1 10E9/L (ref 0–0.2)
BASOPHILS NFR BLD AUTO: 1.1 %
DIFFERENTIAL METHOD BLD: NORMAL
EOSINOPHIL # BLD AUTO: 0.1 10E9/L (ref 0–0.7)
EOSINOPHIL NFR BLD AUTO: 2.1 %
ERYTHROCYTE [DISTWIDTH] IN BLOOD BY AUTOMATED COUNT: 12 % (ref 10–15)
HCT VFR BLD AUTO: 41.2 % (ref 35–47)
HGB BLD-MCNC: 13.5 G/DL (ref 11.7–15.7)
IMM GRANULOCYTES # BLD: 0 10E9/L (ref 0–0.4)
IMM GRANULOCYTES NFR BLD: 0.3 %
LYMPHOCYTES # BLD AUTO: 1.6 10E9/L (ref 0.8–5.3)
LYMPHOCYTES NFR BLD AUTO: 26 %
MCH RBC QN AUTO: 31.8 PG (ref 26.5–33)
MCHC RBC AUTO-ENTMCNC: 32.8 G/DL (ref 31.5–36.5)
MCV RBC AUTO: 97 FL (ref 78–100)
MONOCYTES # BLD AUTO: 0.5 10E9/L (ref 0–1.3)
MONOCYTES NFR BLD AUTO: 8 %
NEUTROPHILS # BLD AUTO: 3.9 10E9/L (ref 1.6–8.3)
NEUTROPHILS NFR BLD AUTO: 62.5 %
PLATELET # BLD AUTO: 197 10E9/L (ref 150–450)
RBC # BLD AUTO: 4.24 10E12/L (ref 3.8–5.2)
WBC # BLD AUTO: 6.2 10E9/L (ref 4–11)

## 2018-10-02 PROCEDURE — 99204 OFFICE O/P NEW MOD 45 MIN: CPT | Performed by: INTERNAL MEDICINE

## 2018-10-02 PROCEDURE — 81256 HFE GENE: CPT | Performed by: INTERNAL MEDICINE

## 2018-10-02 PROCEDURE — 36415 COLL VENOUS BLD VENIPUNCTURE: CPT | Performed by: INTERNAL MEDICINE

## 2018-10-02 PROCEDURE — 85025 COMPLETE CBC W/AUTO DIFF WBC: CPT | Performed by: INTERNAL MEDICINE

## 2018-10-02 ASSESSMENT — PAIN SCALES - GENERAL: PAINLEVEL: MODERATE PAIN (5)

## 2018-10-02 NOTE — LETTER
10/2/2018         RE: Emily Fritz  [id number] [facility] [place] [address]        Dear Colleague,    Thank you for referring your patient, Emily Fritz, to the [facility]. Please see a copy of my visit note below.    Hematology initial visit:  Date on this visit: 10/2/2018    Emily Fritz  is referred by Dr.Jill Cayla Vick for a hematology consultation. She requires evaluation for possible hereditary hemochomatosis    Primary Physician: Kiley Vick     History Of Present Illness:  Ms. Fritz is a 53 year old female who comes in for evaluation because recently she found out that 1 of her sisters whose age is 67 years, was diagnosed with homozygous C282Y hereditary hemochromatosis.  Her ferritin level is 829.  Another sister aged 68 was also tested and her ferritin was found to be 722.  There are 12 siblings in all and others are in process of getting tested.  Some of the siblings had a normal ferritin levels..  Patient herself was recently tested and was found to have ferritin of 208.  Iron was 199 and iron saturation index was 83%.  She generally feels well.  She has decent energy and remains fully functional.  She has had some issues with shoulder discomfort and stiffness in her hips especially if she stands up from a lying down position or sitting position and this has been going on for a year or so.  She denies any new skin problems or any new swellings.  3 years ago she started to have very heavy menstrual bleeding to the point she became anemic and had D&C done.  Her last menstrual period was about 3 years ago.  Off-and-on she has had hot flashes.  She has noticed some decrease in libido over the last couple of years but it has not been a drastic change.  Throughout her life she has had issues with constipation and she occasionally takes MiraLAX to help.  This is not significantly changed.  No infections.  No weight loss.  She tells me that if she is  walking for long distance then she notices very mild tingling in the left little toe but she denies any persistent neuropathy.        ROS:  A comprehensive ROS was otherwise neg        Past Medical/Surgical History:  Past Medical History:   Diagnosis Date     Allergic rhinitis due to animal dander      Chronic constipation      Diagnostic skin and sensitization tests 2/13/12 skin tests pos. for: dog(+)/DM/CR     Family history of breast cancer in mother 12/10/2014     Family history of colonic polyps 12/10/2014     Family history of colonic polyps 12/10/2014     Heart palpitations 3/2009    Holter     House dust mite allergy      Lateral epicondylitis 3/28/2012     Rhinitis, allergic to other allergen      Past Surgical History:   Procedure Laterality Date     C/SECTION, LOW TRANSVERSE  1988     CHOLECYSTECTOMY  2010     DILATION AND CURETTAGE, HYSTEROSCOPY DIAGNOSTIC, COMBINED  8/25/16    Menometrorrhagia     Allergies:  Allergies as of 10/02/2018 - Mick as Reviewed 10/02/2018   Allergen Reaction Noted     Nkda [no known drug allergies]  04/12/2011     Current Medications:  Current Outpatient Prescriptions   Medication Sig Dispense Refill     azelastine (ASTELIN) 0.1 % spray Spray 1-2 sprays into both nostrils 2 times daily 3 Bottle 0     conjugated estrogens (PREMARIN) cream Place 1 g vaginally See Admin Instructions Place 1 gram vaginally at night for 2 weeks, then reduce to twice weekly 30 g 12     CVS PURELAX powder   6     omeprazole (PRILOSEC) 20 MG CR capsule Take 1 capsule (20 mg) by mouth daily 90 capsule 3     Blood Pressure Monitor KIT 1 Device daily (Patient not taking: Reported on 9/5/2018) 1 kit 1     fluticasone (FLONASE) 50 MCG/ACT nasal spray Spray 2 sprays into both nostrils daily (Patient not taking: Reported on 10/2/2018) 16 g 1      Family History:  Family History   Problem Relation Age of Onset     Diabetes Mother 78     Hypertension Mother      Thyroid Disease Mother      Cancer Mother 78      "breast-mastectomy     Circulatory Mother      DVT     Breast Cancer Mother 79     Hypertension Father      Cerebrovascular Disease Father      HEART DISEASE Father      CHF     Hypertension Sister      Other Cancer Sister      Carcinoid Cancer     Breast Cancer Maternal Aunt      postmenopausal     Macular Degeneration Paternal Aunt      Breast Cancer Maternal Aunt      postmenopausal     GASTROINTESTINAL DISEASE Sister      gastric carcinoid tumors with mets to liver     Glaucoma No family hx of      Hemochromatosis, see 282Y mutation in sister  And another sister with elevated ferritin level but she is not sure what that she has been diagnosed with hemochromatosis or not.  Mother had breast cancer at the age of 79.  Father  of CHF at the age of 78.  One sister had some sort of a cancer in her mid 50s.  She has 3 children who are healthy  Social History:  Social History     Social History     Marital status:      Spouse name: Roc     Number of children: 3     Years of education: 14     Occupational History      for public defender [employer]     Social History Main Topics     Smoking status: Former Smoker     Years: 10.00     Quit date: 1995     Smokeless tobacco: Never Used      Comment: lives in smoke free household     Alcohol use No     Drug use: No     Sexual activity: Yes     Partners: Male     Birth control/ protection: Male Surgical      Comment: vasectomy     Other Topics Concern     Parent/Sibling W/ Cabg, Mi Or Angioplasty Before 65f 55m? No     Social History Narrative   She used to smoke but quit 20 years ago.  She drinks alcohol occasionally.  She lives with her .  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   ENT/MOUTH: no mouth lesions. Ears normal  CVS: s1s2 no m r g .   RESPIRATORY: clear to auscultation b/l  GI: soft non tender no hepatosplenomegaly  NEURO: AAOX3  Grossly non focal neuro exam  INTEGUMENT: no obvious rashes  LYMPHATIC: no palpable cervical, supraclavicular, axillary or inguinal LAD  MUSCULOSKELETAL: Unremarkable. No bony tenderness.   EXTREMITIES: no edema  PSYCH: Mentation, mood and affect are normal. Decision making capacity is intact    Laboratory/Imaging Studies  Results for orders placed or performed in visit on 09/18/18   **Iron and iron binding capacity FUTURE anytime   Result Value Ref Range    Iron 199 (H) 35 - 180 ug/dL    Iron Binding Cap 241 240 - 430 ug/dL    Iron Saturation Index 83 (H) 15 - 46 %   Hepatic panel (Albumin, ALT, AST, Bili, Alk Phos, TP)   Result Value Ref Range    Bilirubin Direct 0.2 0.0 - 0.2 mg/dL    Bilirubin Total 0.6 0.2 - 1.3 mg/dL    Albumin 3.7 3.4 - 5.0 g/dL    Protein Total 7.2 6.8 - 8.8 g/dL    Alkaline Phosphatase 96 40 - 150 U/L    ALT 21 0 - 50 U/L    AST 21 0 - 45 U/L   **Ferritin FUTURE 2mo   Result Value Ref Range    Ferritin 208 8 - 252 ng/mL     ASSESSMENT/PLAN:      Hyperferritinemia with elevated iron saturation index usually represents iron overload in the body, and inherited hemochromatosis is the most likely  explanation for that. Her ferritin is in th high normal range though.  In the vast majority of the patients, we are able to detect a mutation in the HFE gene, but in a small minority of  patients, there could be mutations in other genes.  Especially for the type 3 and type 4B hereditary hemochromatosis there could be mutation in the TFR2 gene, for type 3 and [id number] in type 4B.  I would like to testing for hemochromatosis.  As mentioned above her sister has homozygous C282Y mutation so it is likely that we will find the same mutation in her as well.  We discussed that different patients with hereditary hemochromatosis behave  differently as this mutation has variable penetrance.  We briefly discussed the complications which can happen with iron overload due to hemochromatosis as well as how to treat it.  She has previous history of heavy menstrual bleeding and iron deficiency and I am going to check CBC today    We also discussed that if she is found to have the mutation then I would also recommend that her children be tested for the same.    Discussion regarding alcohol intake.  We discussed that alcohol is directly toxic to the liver and I strongly encouraged her to completely abstain from alcohol as I cannot give her a safe limit of alcohol intake in the setting of hemochromatosis.      I would like to see her back in 3-4 weeks with repeat iron studies prior to that and I have told her to completely abstain from alcohol during this time so that we can have a good baseline at that time before deciding on treatment.      All of her questions were answered to her satisfaction.  She is agreeable and comfortable with the plan.    Alex Gayle              Again, thank you for allowing me to participate in the care of your patient.        Sincerely,        Alex Gayle MD

## 2018-10-02 NOTE — PROGRESS NOTES
Hematology initial visit:  Date on this visit: 10/2/2018    Emily Fritz  is referred by Dr.Jill Cayla Vick for a hematology consultation. She requires evaluation for possible hereditary hemochomatosis    Primary Physician: Kiley Vick     History Of Present Illness:  Ms. Fritz is a 53 year old female who comes in for evaluation because recently she found out that 1 of her sisters whose age is 67 years, was diagnosed with homozygous C282Y hereditary hemochromatosis.  Her ferritin level is 829.  Another sister aged 68 was also tested and her ferritin was found to be 722.  There are 12 siblings in all and others are in process of getting tested.  Some of the siblings had a normal ferritin levels..  Patient herself was recently tested and was found to have ferritin of 208.  Iron was 199 and iron saturation index was 83%.  She generally feels well.  She has decent energy and remains fully functional.  She has had some issues with shoulder discomfort and stiffness in her hips especially if she stands up from a lying down position or sitting position and this has been going on for a year or so.  She denies any new skin problems or any new swellings.  3 years ago she started to have very heavy menstrual bleeding to the point she became anemic and had D&C done.  Her last menstrual period was about 3 years ago.  Off-and-on she has had hot flashes.  She has noticed some decrease in libido over the last couple of years but it has not been a drastic change.  Throughout her life she has had issues with constipation and she occasionally takes MiraLAX to help.  This is not significantly changed.  No infections.  No weight loss.  She tells me that if she is walking for long distance then she notices very mild tingling in the left little toe but she denies any persistent neuropathy.        ROS:  A comprehensive ROS was otherwise neg        Past Medical/Surgical History:  Past Medical History:   Diagnosis Date      Allergic rhinitis due to animal dander      Chronic constipation      Diagnostic skin and sensitization tests 2/13/12 skin tests pos. for: dog(+)/DM/CR     Family history of breast cancer in mother 12/10/2014     Family history of colonic polyps 12/10/2014     Family history of colonic polyps 12/10/2014     Heart palpitations 3/2009    Holter     House dust mite allergy      Lateral epicondylitis 3/28/2012     Rhinitis, allergic to other allergen      Past Surgical History:   Procedure Laterality Date     C/SECTION, LOW TRANSVERSE  1988     CHOLECYSTECTOMY  2010     DILATION AND CURETTAGE, HYSTEROSCOPY DIAGNOSTIC, COMBINED  8/25/16    Menometrorrhagia     Allergies:  Allergies as of 10/02/2018 - Mick as Reviewed 10/02/2018   Allergen Reaction Noted     Nkda [no known drug allergies]  04/12/2011     Current Medications:  Current Outpatient Prescriptions   Medication Sig Dispense Refill     azelastine (ASTELIN) 0.1 % spray Spray 1-2 sprays into both nostrils 2 times daily 3 Bottle 0     conjugated estrogens (PREMARIN) cream Place 1 g vaginally See Admin Instructions Place 1 gram vaginally at night for 2 weeks, then reduce to twice weekly 30 g 12     CVS PURELAX powder   6     omeprazole (PRILOSEC) 20 MG CR capsule Take 1 capsule (20 mg) by mouth daily 90 capsule 3     Blood Pressure Monitor KIT 1 Device daily (Patient not taking: Reported on 9/5/2018) 1 kit 1     fluticasone (FLONASE) 50 MCG/ACT nasal spray Spray 2 sprays into both nostrils daily (Patient not taking: Reported on 10/2/2018) 16 g 1      Family History:  Family History   Problem Relation Age of Onset     Diabetes Mother 78     Hypertension Mother      Thyroid Disease Mother      Cancer Mother 78     breast-mastectomy     Circulatory Mother      DVT     Breast Cancer Mother 79     Hypertension Father      Cerebrovascular Disease Father      HEART DISEASE Father      CHF     Hypertension Sister      Other Cancer Sister      Carcinoid Cancer     Breast  "Cancer Maternal Aunt      postmenopausal     Macular Degeneration Paternal Aunt      Breast Cancer Maternal Aunt      postmenopausal     GASTROINTESTINAL DISEASE Sister      gastric carcinoid tumors with mets to liver     Glaucoma No family hx of      Hemochromatosis, see 282Y mutation in sister  And another sister with elevated ferritin level but she is not sure what that she has been diagnosed with hemochromatosis or not.  Mother had breast cancer at the age of 79.  Father  of CHF at the age of 78.  One sister had some sort of a cancer in her mid 50s.  She has 3 children who are healthy  Social History:  Social History     Social History     Marital status:      Spouse name: Roc     Number of children: 3     Years of education: 14     Occupational History      for public defender [employer]     Social History Main Topics     Smoking status: Former Smoker     Years: 10.00     Quit date: 1995     Smokeless tobacco: Never Used      Comment: lives in smoke free household     Alcohol use No     Drug use: No     Sexual activity: Yes     Partners: Male     Birth control/ protection: Male Surgical      Comment: vasectomy     Other Topics Concern     Parent/Sibling W/ Cabg, Mi Or Angioplasty Before 65f 55m? No     Social History Narrative   She used to smoke but quit 20 years ago.  She drinks alcohol occasionally.  She lives with her .  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   ENT/MOUTH: no mouth lesions. Ears normal  CVS: s1s2 no m r g .   RESPIRATORY: clear to auscultation b/l  GI: soft non tender no hepatosplenomegaly  NEURO: AAOX3  Grossly non focal neuro exam  INTEGUMENT: no obvious rashes  LYMPHATIC: no palpable " cervical, supraclavicular, axillary or inguinal LAD  MUSCULOSKELETAL: Unremarkable. No bony tenderness.   EXTREMITIES: no edema  PSYCH: Mentation, mood and affect are normal. Decision making capacity is intact    Laboratory/Imaging Studies  Results for orders placed or performed in visit on 09/18/18   **Iron and iron binding capacity FUTURE anytime   Result Value Ref Range    Iron 199 (H) 35 - 180 ug/dL    Iron Binding Cap 241 240 - 430 ug/dL    Iron Saturation Index 83 (H) 15 - 46 %   Hepatic panel (Albumin, ALT, AST, Bili, Alk Phos, TP)   Result Value Ref Range    Bilirubin Direct 0.2 0.0 - 0.2 mg/dL    Bilirubin Total 0.6 0.2 - 1.3 mg/dL    Albumin 3.7 3.4 - 5.0 g/dL    Protein Total 7.2 6.8 - 8.8 g/dL    Alkaline Phosphatase 96 40 - 150 U/L    ALT 21 0 - 50 U/L    AST 21 0 - 45 U/L   **Ferritin FUTURE 2mo   Result Value Ref Range    Ferritin 208 8 - 252 ng/mL     ASSESSMENT/PLAN:      Hyperferritinemia with elevated iron saturation index usually represents iron overload in the body, and inherited hemochromatosis is the most likely  explanation for that. Her ferritin is in th high normal range though.  In the vast majority of the patients, we are able to detect a mutation in the HFE gene, but in a small minority of  patients, there could be mutations in other genes.  Especially for the type 3 and type 4B hereditary hemochromatosis there could be mutation in the TFR2 gene, for type 3 and [id number] in type 4B.  I would like to testing for hemochromatosis.  As mentioned above her sister has homozygous C282Y mutation so it is likely that we will find the same mutation in her as well.  We discussed that different patients with hereditary hemochromatosis behave differently as this mutation has variable penetrance.  We briefly discussed the complications which can happen with iron overload due to hemochromatosis as well as how to treat it.  She has previous history of heavy menstrual bleeding and iron deficiency and I am  going to check CBC today    We also discussed that if she is found to have the mutation then I would also recommend that her children be tested for the same.    Discussion regarding alcohol intake.  We discussed that alcohol is directly toxic to the liver and I strongly encouraged her to completely abstain from alcohol as I cannot give her a safe limit of alcohol intake in the setting of hemochromatosis.      I would like to see her back in 3-4 weeks with repeat iron studies prior to that and I have told her to completely abstain from alcohol during this time so that we can have a good baseline at that time before deciding on treatment.      All of her questions were answered to her satisfaction.  She is agreeable and comfortable with the plan.    Alex Gayle

## 2018-10-02 NOTE — NURSING NOTE
"Oncology Rooming Note    October 2, 2018 12:41 PM   Emily Fritz is a 53 year old female who presents for:    Chief Complaint   Patient presents with     Oncology Clinic Visit     new patient      Initial Vitals: /83  Pulse 80  Temp 97.9  F (36.6  C)  Resp 18  Ht 1.791 m (5' 10.5\")  Wt 69.9 kg (154 lb)  LMP  (LMP Unknown)  SpO2 100%  BMI 21.78 kg/m2 Estimated body mass index is 21.78 kg/(m^2) as calculated from the following:    Height as of this encounter: 1.791 m (5' 10.5\").    Weight as of this encounter: 69.9 kg (154 lb). Body surface area is 1.86 meters squared.  Moderate Pain (5) Comment: chiro and tens unit   No LMP recorded (lmp unknown). Patient is postmenopausal.  Allergies reviewed: Yes  Medications reviewed: Yes    Medications: Medication refills not needed today.  Pharmacy name entered into [employer]: [id number] - [place], [place] - [address],  [address]         5 minutes for nursing intake (face to face time)     Lucina Nelson LPN              "

## 2018-10-02 NOTE — MR AVS SNAPSHOT
After Visit Summary   10/2/2018    Emily Fritz    MRN: [id number]           Patient Information     Date Of Birth          1965        Visit Information        Provider Department      10/2/2018 12:45 PM Alex Gayle MD [facility]        Today's Diagnoses     Family history of hemochromatosis    -  1      Care Instructions    Labs today    See me back in 3-4 weeks with labs prior          Follow-ups after your visit        Your next 10 appointments already scheduled     Oct 24, 2018  3:45 PM CDT   Return Visit with Alex Gayle MD   [facility] ([facility])    [id number] [place] [id number] [phone]              Future tests that were ordered for you today     Open Future Orders        Priority Expected Expires Ordered    *CBC with platelets differential Routine  10/2/2019 10/2/2018            Who to contact     If you have questions or need follow up information about today's clinic visit or your schedule please contact [facility] directly at [phone].  Normal or non-critical lab and imaging results will be communicated to you by MyChart, letter or phone within 4 business days after the clinic has received the results. If you do not hear from us within 7 days, please contact the clinic through [email or web address] or phone. If you have a critical or abnormal lab result, we will notify you by phone as soon as possible.  Submit refill requests through [employer] or call your pharmacy and they will forward the refill request to us. Please allow 3 business days for your refill to be completed.          Additional Information About Your Visit        MyChart Information     [employer] gives you secure access to your electronic health record. If you see a primary care provider, you can also send messages to your care team and make appointments. If you have questions, please call your primary care clinic.  If you do  "not have a primary care provider, please call [phone] and they will assist you.      [employer] is an electronic gateway that provides easy, online access to your medical records. With [employer], you can request a clinic appointment, read your test results, renew a prescription or communicate with your care team.     To access your existing account, please contact your [facility] or call [phone] for assistance.        Care EveryWhere ID     This is your Care EveryWhere ID. This could be used by other organizations to access your [facility] medical records  [id number]        Your Vitals Were     Pulse Temperature Respirations Height Last Period Pulse Oximetry    80 97.9  F (36.6  C) 18 1.791 m (5' 10.5\") (LMP Unknown) 100%    BMI (Body Mass Index)                   21.78 kg/m2            Blood Pressure from Last 3 Encounters:   10/02/18 120/83   09/05/18 125/85   02/23/18 116/75    Weight from Last 3 Encounters:   10/02/18 69.9 kg (154 lb)   09/05/18 68.5 kg (151 lb)   02/23/18 68.9 kg (152 lb)              We Performed the Following     Hemochromatosis mutation        Primary Care Provider Office Phone # Fax #    Kiley Vick [phone] [address] [facility] [address]        Equal Access to Services     KELECHI PEMBERTON : Hadii aad ku hadasho Soomaali, waaxda luqadaha, qaybta kaalmada adeegyada, manoj weller. So [facility] [phone].    ATENCIÓN: Si habla español, tiene a grider disposición servicios gratuitos de asistencia lingüística. Llame al [phone].    We comply with applicable federal civil rights laws and [place] laws. We do not discriminate on the basis of race, color, national origin, age, disability, sex, sexual orientation, or gender identity.            Thank you!     Thank you for choosing [facility]  for your care. Our goal is always to provide you with excellent care. Hearing back from " our patients is one way we can continue to improve our services. Please take a few minutes to complete the written survey that you may receive in the mail after your visit with us. Thank you!             Your Updated Medication List - Protect others around you: Learn how to safely use, store and throw away your medicines at www.disposemymeds.org.          This list is accurate as of 10/2/18  1:21 PM.  Always use your most recent med list.                   Brand Name Dispense Instructions for use Diagnosis    azelastine 0.1 % nasal spray    ASTELIN    3 Bottle    Spray 1-2 sprays into both nostrils 2 times daily    Chronic rhinitis, Dysfunction of Eustachian tube, bilateral, Adhesive otitis media, left       Blood Pressure Monitor Kit     1 kit    1 Device daily    MI (dyspnea on exertion)       conjugated estrogens cream    PREMARIN    30 g    Place 1 g vaginally See Admin Instructions Place 1 gram vaginally at night for 2 weeks, then reduce to twice weekly    Atrophic vaginitis       CVS PURELAX powder   Generic drug:  polyethylene glycol           fluticasone 50 MCG/ACT spray    FLONASE    16 g    Spray 2 sprays into both nostrils daily    Seasonal allergic rhinitis       omeprazole 20 MG CR capsule    priLOSEC    90 capsule    Take 1 capsule (20 mg) by mouth daily    Gastroesophageal reflux disease without esophagitis

## 2018-10-10 DIAGNOSIS — E83.19 IRON EXCESS: Primary | ICD-10-CM

## 2018-10-10 LAB — COPATH REPORT: NORMAL

## 2018-10-24 ENCOUNTER — ONCOLOGY VISIT (OUTPATIENT)
Dept: ONCOLOGY | Facility: CLINIC | Age: 53
End: 2018-10-24
Payer: COMMERCIAL

## 2018-10-24 VITALS
TEMPERATURE: 97.7 F | HEART RATE: 82 BPM | OXYGEN SATURATION: 99 % | DIASTOLIC BLOOD PRESSURE: 86 MMHG | WEIGHT: 152.7 LBS | BODY MASS INDEX: 21.6 KG/M2 | SYSTOLIC BLOOD PRESSURE: 138 MMHG

## 2018-10-24 DIAGNOSIS — E83.19 IRON EXCESS: Primary | ICD-10-CM

## 2018-10-24 PROCEDURE — 99213 OFFICE O/P EST LOW 20 MIN: CPT | Performed by: INTERNAL MEDICINE

## 2018-10-24 ASSESSMENT — PAIN SCALES - GENERAL: PAINLEVEL: NO PAIN (0)

## 2018-10-24 NOTE — MR AVS SNAPSHOT
After Visit Summary   10/24/2018    Emily Fritz    MRN: [id number]           Patient Information     Date Of Birth          1965        Visit Information        Provider Department      10/24/2018 3:45 PM Alex Gayle MD [facility]        Care Instructions    Please completely abstain from alcohol    Repeat labs in 2 months    We will determine follow up accordingly              Follow-ups after your visit        Your next 10 appointments already scheduled     Dec 21, 2018  9:00 AM CST   LAB with LAB ONC [facility] ([facility])    [address] [place] [id number] [phone]           Please do not eat 10-12 hours before your appointment if you are coming in fasting for labs on lipids, cholesterol, or glucose (sugar). This does not apply to pregnant women. Water, hot tea and black coffee (with nothing added) are okay. Do not drink other fluids, diet soda or chew gum.              Who to contact     If you have questions or need follow up information about today's clinic visit or your schedule please contact [facility] directly at [phone].  Normal or non-critical lab and imaging results will be communicated to you by Inventbuyhart, letter or phone within 4 business days after the clinic has received the results. If you do not hear from us within 7 days, please contact the clinic through AddShopperst or phone. If you have a critical or abnormal lab result, we will notify you by phone as soon as possible.  Submit refill requests through PlaytestCloud or call your pharmacy and they will forward the refill request to us. Please allow 3 business days for your refill to be completed.          Additional Information About Your Visit        Inventbuyhart Information     PlaytestCloud gives you secure access to your electronic health record. If you see a primary care provider, you can also send messages to your care team and  make appointments. If you have questions, please call your primary care clinic.  If you do not have a primary care provider, please call [phone] and they will assist you.      [employer] is an electronic gateway that provides easy, online access to your medical records. With [employer], you can request a clinic appointment, read your test results, renew a prescription or communicate with your care team.     To access your existing account, please contact your [facility] or call [phone] for assistance.        Care EveryWhere ID     This is your Care EveryWhere ID. This could be used by other organizations to access your [facility] medical records  [id number]        Your Vitals Were     Pulse Temperature Last Period Pulse Oximetry BMI (Body Mass Index)       82 97.7  F (36.5  C) (Oral) (LMP Unknown) 99% 21.6 kg/m2        Blood Pressure from Last 3 Encounters:   10/24/18 138/86   10/02/18 120/83   09/05/18 125/85    Weight from Last 3 Encounters:   10/24/18 69.3 kg (152 lb 11.2 oz)   10/02/18 69.9 kg (154 lb)   09/05/18 68.5 kg (151 lb)              Today, you had the following     No orders found for display       Primary Care Provider Office Phone # Fax #    Kiley Cayla Vick [phone] [address] [facility] [address]        Equal Access to Services     KELECHI PEMBERTON : Hadii jose antonio ku hadasho Soomaali, waaxda luqadaha, qaybta kaalmada adeegyada, manoj garcia . So [facility] [phone].    ATENCIÓN: Si habla español, tiene a grider disposición servicios gratuitos de asistencia lingüística. Llame al [phone].    We comply with applicable federal civil rights laws and [place] laws. We do not discriminate on the basis of race, color, national origin, age, disability, sex, sexual orientation, or gender identity.            Thank you!     Thank you for choosing [facility]  for your care. Our goal is always to  provide you with excellent care. Hearing back from our patients is one way we can continue to improve our services. Please take a few minutes to complete the written survey that you may receive in the mail after your visit with us. Thank you!             Your Updated Medication List - Protect others around you: Learn how to safely use, store and throw away your medicines at www.disposemymeds.org.          This list is accurate as of 10/24/18  4:21 PM.  Always use your most recent med list.                   Brand Name Dispense Instructions for use Diagnosis    azelastine 0.1 % nasal spray    ASTELIN    3 Bottle    Spray 1-2 sprays into both nostrils 2 times daily    Chronic rhinitis, Dysfunction of Eustachian tube, bilateral, Adhesive otitis media, left       Blood Pressure Monitor Kit     1 kit    1 Device daily    MI (dyspnea on exertion)       conjugated estrogens cream    PREMARIN    30 g    Place 1 g vaginally See Admin Instructions Place 1 gram vaginally at night for 2 weeks, then reduce to twice weekly    Atrophic vaginitis       [facility] PURELAX powder   Generic drug:  polyethylene glycol           fluticasone 50 MCG/ACT spray    FLONASE    16 g    Spray 2 sprays into both nostrils daily    Seasonal allergic rhinitis       omeprazole 20 MG CR capsule    priLOSEC    90 capsule    Take 1 capsule (20 mg) by mouth daily    Gastroesophageal reflux disease without esophagitis

## 2018-10-24 NOTE — PROGRESS NOTES
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  I have copied and updated from prior note.  Ms. Fritz is a 53 year old female who comes in for evaluation because recently she found out that 1 of her sisters whose age is 67 years, was diagnosed with homozygous C282Y hereditary hemochromatosis.  Her ferritin level is 829.  Another sister aged 68 was also tested and her ferritin was found to be 722.  There are 12 siblings in all and others are in process of getting tested.  Some of the siblings had a normal ferritin levels..  Patient herself was recently tested and was found to have ferritin of 208.  Iron was 199 and iron saturation index was 83%.  When she saw me we decided on doing testing for hemochromatosis.  She is coming back for follow-up    Overall she feels well.  She tells me that one another sister was also diagnosed with C 282Y mutation and both are undergoing phlebotomies now.  She feels about the same as before.  Denies any new complaints          ROS:  A comprehensive ROS was essentially unremarkable    I reviewed other history in [facility] as below    Past Medical/Surgical History:  Past Medical History:   Diagnosis Date     Allergic rhinitis due to animal dander      Chronic constipation      Diagnostic skin and sensitization tests 2/13/12 skin tests pos. for: dog(+)/DM/CR     Family history of breast cancer in mother 12/10/2014     Family history of colonic polyps 12/10/2014     Family history of colonic polyps 12/10/2014     Heart palpitations 3/2009    [facility]     House dust mite allergy      Lateral epicondylitis 3/28/2012     Rhinitis, allergic to other allergen      Past Surgical History:   Procedure Laterality Date     C/SECTION, LOW TRANSVERSE  1988     CHOLECYSTECTOMY  2010     DILATION AND CURETTAGE, HYSTEROSCOPY  DIAGNOSTIC, COMBINED  16    Menometrorrhagia     Allergies:  Allergies as of 10/24/2018 - Mick as Reviewed 10/24/2018   Allergen Reaction Noted     Nkda [no known drug allergies]  2011     Current Medications:  Current Outpatient Prescriptions   Medication Sig Dispense Refill     azelastine (ASTELIN) 0.1 % spray Spray 1-2 sprays into both nostrils 2 times daily 3 Bottle 0     Blood Pressure Monitor KIT 1 Device daily (Patient not taking: Reported on 2018) 1 kit 1     conjugated estrogens (PREMARIN) cream Place 1 g vaginally See Admin Instructions Place 1 gram vaginally at night for 2 weeks, then reduce to twice weekly 30 g 12     [facility] PURELAX powder   6     fluticasone (FLONASE) 50 MCG/ACT nasal spray Spray 2 sprays into both nostrils daily (Patient not taking: Reported on 10/2/2018) 16 g 1     omeprazole (PRILOSEC) 20 MG CR capsule Take 1 capsule (20 mg) by mouth daily 90 capsule 3      Family History:  Family History   Problem Relation Age of Onset     Diabetes Mother 78     Hypertension Mother      Thyroid Disease Mother      Cancer Mother 78     breast-mastectomy     Circulatory Mother      DVT     Breast Cancer Mother 79     Hypertension Father      Cerebrovascular Disease Father      HEART DISEASE Father      CHF     Hypertension Sister      Other Cancer Sister      Carcinoid Cancer     Breast Cancer Maternal Aunt      postmenopausal     Macular Degeneration Paternal Aunt      Breast Cancer Maternal Aunt      postmenopausal     GASTROINTESTINAL DISEASE Sister      gastric carcinoid tumors with mets to liver     Glaucoma No family hx of      Hemochromatosis, see 282Y mutation in sister  And another sister with elevated ferritin level but she is not sure what that she has been diagnosed with hemochromatosis or not.  Mother had breast cancer at the age of 79.  Father  of CHF at the age of 78.  One sister had Carcinoid ctumor in her mid 50s. 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 No     Social History Narrative   She used to smoke but quit 20 years ago.  She drinks alcohol occasionally.  She lives with her .  She works as an  for a 's office  Physical Exam:  /86  Pulse 82  Temp 97.7  F (36.5  C) (Oral)  Wt 69.3 kg (152 lb 11.2 oz)  LMP  (LMP Unknown)  SpO2 99%  BMI 21.6 kg/m2  CONSTITUTIONAL: No apparent distress  NEURO: Alert and oriented ×3  PSYCH: Mentation, mood and affect are appropriate  I did not do a complete physical exam today    Laboratory/Imaging Studies  Results for orders placed or performed in visit on 10/02/18   *CBC with platelets differential   Result Value Ref Range    WBC 6.2 4.0 - 11.0 10e9/L    RBC Count 4.24 3.8 - 5.2 10e12/L    Hemoglobin 13.5 11.7 - 15.7 g/dL    Hematocrit 41.2 35.0 - 47.0 %    MCV 97 78 - 100 fl    MCH 31.8 26.5 - 33.0 pg    MCHC 32.8 31.5 - 36.5 g/dL    RDW 12.0 10.0 - 15.0 %    Platelet Count 197 150 - 450 10e9/L    % Neutrophils 62.5 %    % Lymphocytes 26.0 %    % Monocytes 8.0 %    % Eosinophils 2.1 %    % Basophils 1.1 %    % Immature Granulocytes 0.3 %    Absolute Neutrophil 3.9 1.6 - 8.3 10e9/L    Absolute Lymphocytes 1.6 0.8 - 5.3 10e9/L    Absolute Monocytes 0.5 0.0 - 1.3 10e9/L    Absolute Eosinophils 0.1  0.0 - 0.7 10e9/L    Absolute Basophils 0.1 0.0 - 0.2 10e9/L    Abs Immature Granulocytes 0.0 0 - 0.4 10e9/L    Diff Method Automated Method        Hemochromatosis HFE gene mutation analysis  INTERPRETATION:   This patient does not carry the C282Y, the H63D or the S65C mutations in   the HFE gene. The absence of these   mutations, particularly in non-Caucasians, does not rule out the presence   of hemochromatosis.     ASSESSMENT/PLAN:    She has been tested negative for HFE gene mutation for hemochromatosis although 2 of her sisters carry C282Y mutations.  It would be extremely unlikely that she has a separate kind of mutation for hemochromatosis although in a small minority of  patients, there could be mutations in other genes.  Especially for the type 3 and type 4B hereditary hemochromatosis there could be mutation in the TFR2 gene, for type 3 and [id number] in type 4B.     The other possibility of elevated iron saturation could be her alcohol use.  I strongly encouraged her to completely abstain from drinking any alcohol for 2 months and then we will repeat her iron studies.  If they are persistently abnormal then we can consider testing her for the other mutations as described above.  At this time there is no urgent need to start phlebotomy even if she is found to have the hemochromatosis mutation.  She agrees to completely abstain from alcohol and repeat labs in 2 months.    We will determine the follow-up accordingly.    I answered all of her questions to her satisfaction.  She is agreeable and comfortable with the plan.    Alex Gayle

## 2018-10-24 NOTE — NURSING NOTE
"Oncology Rooming Note    October 24, 2018 3:48 PM   Emily Fritz is a 53 year old female who presents for:    Chief Complaint   Patient presents with     Oncology Clinic Visit     3 week f/u     Initial Vitals: /86  Pulse 82  Temp 97.7  F (36.5  C) (Oral)  Wt 69.3 kg (152 lb 11.2 oz)  LMP  (LMP Unknown)  SpO2 99%  BMI 21.6 kg/m2 Estimated body mass index is 21.6 kg/(m^2) as calculated from the following:    Height as of 10/2/18: 1.791 m (5' 10.5\").    Weight as of this encounter: 69.3 kg (152 lb 11.2 oz). Body surface area is 1.86 meters squared.  No Pain (0) Comment: Data Unavailable   No LMP recorded (lmp unknown). Patient is postmenopausal.  Allergies reviewed: Yes  Medications reviewed: Yes    Medications: Medication refills not needed today.  Pharmacy name entered into Simple.TV: [id number] - [place], [place] - [address] [facility] [facility]    Clinical concerns: blood test results Dr. Gayle was notified.    5 minutes for nursing intake (face to face time)     Reddy Muniz RN              "

## 2018-10-24 NOTE — LETTER
10/24/2018         RE: Emily Fritz  [address] [address] [facility] [place] [address]        Dear Colleague,    Thank you for referring your patient, Emily Fritz, to the [facility]. Please see a copy of my visit note below.    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  I have copied and updated from prior note.  Ms. Fritz is a 53 year old female who comes in for evaluation because recently she found out that 1 of her sisters whose age is 67 years, was diagnosed with homozygous C282Y hereditary hemochromatosis.  Her ferritin level is 829.  Another sister aged 68 was also tested and her ferritin was found to be 722.  There are 12 siblings in all and others are in process of getting tested.  Some of the siblings had a normal ferritin levels..  Patient herself was recently tested and was found to have ferritin of 208.  Iron was 199 and iron saturation index was 83%.  When she saw me we decided on doing testing for hemochromatosis.  She is coming back for follow-up    Overall she feels well.  She tells me that one another sister was also diagnosed with C 282Y mutation and both are undergoing phlebotomies now.  She feels about the same as before.  Denies any new complaints          ROS:  A comprehensive ROS was essentially unremarkable    I reviewed other history in [facility] as below    Past Medical/Surgical History:  Past Medical History:   Diagnosis Date     Allergic rhinitis due to animal dander      Chronic constipation      Diagnostic skin and sensitization tests 2/13/12 skin tests pos. for: dog(+)/DM/CR     Family history of breast cancer in mother 12/10/2014     Family history of colonic polyps 12/10/2014     Family history of colonic polyps 12/10/2014     Heart palpitations 3/2009    [facility]  dust mite allergy      Lateral epicondylitis 3/28/2012     Rhinitis, allergic to other allergen      Past Surgical History:   Procedure Laterality Date     C/SECTION, LOW TRANSVERSE  1988     CHOLECYSTECTOMY  2010     DILATION AND CURETTAGE, HYSTEROSCOPY DIAGNOSTIC, COMBINED  8/25/16    Menometrorrhagia     Allergies:  Allergies as of 10/24/2018 - Mick as Reviewed 10/24/2018   Allergen Reaction Noted     Nkda [no known drug allergies]  04/12/2011     Current Medications:  Current Outpatient Prescriptions   Medication Sig Dispense Refill     azelastine (ASTELIN) 0.1 % spray Spray 1-2 sprays into both nostrils 2 times daily 3 Bottle 0     Blood Pressure Monitor KIT 1 Device daily (Patient not taking: Reported on 9/5/2018) 1 kit 1     conjugated estrogens (PREMARIN) cream Place 1 g vaginally See Admin Instructions Place 1 gram vaginally at night for 2 weeks, then reduce to twice weekly 30 g 12     [facility] PURELAX powder   6     fluticasone (FLONASE) 50 MCG/ACT nasal spray Spray 2 sprays into both nostrils daily (Patient not taking: Reported on 10/2/2018) 16 g 1     omeprazole (PRILOSEC) 20 MG CR capsule Take 1 capsule (20 mg) by mouth daily 90 capsule 3      Family History:  Family History   Problem Relation Age of Onset     Diabetes Mother 78     Hypertension Mother      Thyroid Disease Mother      Cancer Mother 78     breast-mastectomy     Circulatory Mother      DVT     Breast Cancer Mother 79     Hypertension Father      Cerebrovascular Disease Father      HEART DISEASE Father      CHF     Hypertension Sister      Other Cancer Sister      Carcinoid Cancer     Breast Cancer Maternal Aunt      postmenopausal     Macular Degeneration Paternal Aunt      Breast Cancer Maternal Aunt      postmenopausal     GASTROINTESTINAL DISEASE Sister      gastric carcinoid tumors with mets to liver     Glaucoma No family hx of      Hemochromatosis, see 282Y mutation in sister  And another sister with elevated ferritin level but she is  not sure what that she has been diagnosed with hemochromatosis or not.  Mother had breast cancer at the age of 79.  Father  of CHF at the age of 78.  One sister had Carcinoid ctumor in her mid 50s. 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     Quit date: 1995     Smokeless tobacco: Never Used      Comment: lives in smoke free household     Alcohol use No     Drug use: No     Sexual activity: Yes     Partners: Male     Birth control/ protection: Male Surgical      Comment: vasectomy     Other Topics Concern     Parent/Sibling W/ Cabg, Mi Or Angioplasty Before 65f 55m? No     Social History Narrative   She used to smoke but quit 20 years ago.  She drinks alcohol occasionally.  She lives with her .  She works as an  for a 's office  Physical Exam:  /86  Pulse 82  Temp 97.7  F (36.5  C) (Oral)  Wt 69.3 kg (152 lb 11.2 oz)  LMP  (LMP Unknown)  SpO2 99%  BMI 21.6 kg/m2  CONSTITUTIONAL: No apparent distress  NEURO: Alert and oriented ×3  PSYCH: Mentation, mood and affect are appropriate  I did not do a complete physical exam today    Laboratory/Imaging Studies  Results for orders placed or performed in visit on 10/02/18   *CBC with platelets differential   Result Value Ref Range    WBC 6.2 4.0 - 11.0 10e9/L    RBC Count 4.24 3.8 - 5.2 10e12/L    Hemoglobin 13.5 11.7 - 15.7 g/dL    Hematocrit 41.2 35.0 - 47.0 %    MCV 97 78 - 100 fl    MCH 31.8 26.5 - 33.0 pg    MCHC 32.8 31.5 - 36.5 g/dL    RDW 12.0 10.0 - 15.0 %    Platelet Count 197 150 - 450 10e9/L    % Neutrophils 62.5 %    % Lymphocytes 26.0 %    %  Monocytes 8.0 %    % Eosinophils 2.1 %    % Basophils 1.1 %    % Immature Granulocytes 0.3 %    Absolute Neutrophil 3.9 1.6 - 8.3 10e9/L    Absolute Lymphocytes 1.6 0.8 - 5.3 10e9/L    Absolute Monocytes 0.5 0.0 - 1.3 10e9/L    Absolute Eosinophils 0.1 0.0 - 0.7 10e9/L    Absolute Basophils 0.1 0.0 - 0.2 10e9/L    Abs Immature Granulocytes 0.0 0 - 0.4 10e9/L    Diff Method Automated Method        Hemochromatosis HFE gene mutation analysis  INTERPRETATION:   This patient does not carry the C282Y, the H63D or the S65C mutations in   the HFE gene. The absence of these   mutations, particularly in non-Caucasians, does not rule out the presence   of hemochromatosis.     ASSESSMENT/PLAN:    She has been tested negative for HFE gene mutation for hemochromatosis although 2 of her sisters carry C282Y mutations.  It would be extremely unlikely that she has a separate kind of mutation for hemochromatosis although in a small minority of  patients, there could be mutations in other genes.  Especially for the type 3 and type 4B hereditary hemochromatosis there could be mutation in the TFR2 gene, for type 3 and [id number] in type 4B.     The other possibility of elevated iron saturation could be her alcohol use.  I strongly encouraged her to completely abstain from drinking any alcohol for 2 months and then we will repeat her iron studies.  If they are persistently abnormal then we can consider testing her for the other mutations as described above.  At this time there is no urgent need to start phlebotomy even if she is found to have the hemochromatosis mutation.  She agrees to completely abstain from alcohol and repeat labs in 2 months.    We will determine the follow-up accordingly.    I answered all of her questions to her satisfaction.  She is agreeable and comfortable with the plan.    Alex Gayle              Again, thank you for allowing me to participate in the care of your patient.        Sincerely,        Alex Gayle,  MD

## 2018-10-24 NOTE — PATIENT INSTRUCTIONS
Please completely abstain from alcohol    Repeat labs in 2 months    We will determine follow up accordingly

## 2018-12-20 ENCOUNTER — OFFICE VISIT (OUTPATIENT)
Dept: FAMILY MEDICINE | Facility: CLINIC | Age: 53
End: 2018-12-20
Payer: COMMERCIAL

## 2018-12-20 VITALS
HEART RATE: 78 BPM | TEMPERATURE: 97.8 F | DIASTOLIC BLOOD PRESSURE: 72 MMHG | RESPIRATION RATE: 14 BRPM | WEIGHT: 152 LBS | SYSTOLIC BLOOD PRESSURE: 118 MMHG | OXYGEN SATURATION: 100 % | BODY MASS INDEX: 21.5 KG/M2

## 2018-12-20 DIAGNOSIS — G43.109 MIGRAINE WITH AURA AND WITHOUT STATUS MIGRAINOSUS, NOT INTRACTABLE: Primary | ICD-10-CM

## 2018-12-20 PROCEDURE — 99213 OFFICE O/P EST LOW 20 MIN: CPT | Performed by: PHYSICIAN ASSISTANT

## 2018-12-20 RX ORDER — RIZATRIPTAN BENZOATE 10 MG/1
10 TABLET ORAL
Qty: 18 TABLET | Refills: 3 | Status: SHIPPED | OUTPATIENT
Start: 2018-12-20 | End: 2020-02-24

## 2018-12-20 ASSESSMENT — PAIN SCALES - GENERAL: PAINLEVEL: NO PAIN (0)

## 2018-12-20 NOTE — PROGRESS NOTES
SUBJECTIVE:   Emily Fritz is a 53 year old female who presents to clinic today for the following health issues:      Discuss migraines. She has a history of migraines that will last for 3 days, but has never thought to come in for medication. She is typically able to rest and use over the counter NSAIDS or tylenol when she gets them, but was discussing with her friend and realized there are medications that can helpful. She will have the migraines up to 6 times / year. No particular triggers that she is aware of. They will typically start wit pain in there neck on one side and progress to her head. No vision changes. No weakness / numbness / nausea or vomiting.   She currently does not have symptoms and feels well.       Problem list and histories reviewed & adjusted, as indicated.  Additional history: as documented    Patient Active Problem List   Diagnosis     CARDIOVASCULAR SCREENING; LDL GOAL LESS THAN 160     Cholelithiasis     Diagnostic skin and sensitization tests     Allergic rhinitis due to animal dander     Rhinitis, allergic to other allergen     House dust mite allergy     Lateral epicondylitis     Vitamin D deficiency disease     Heart palpitations     Family history of colonic polyps     Family history of breast cancer in mother     Gastroesophageal reflux disease without esophagitis     Menorrhagia with regular cycle     Dry eyes     Pain in joint of right shoulder     Past Surgical History:   Procedure Laterality Date     C/SECTION, LOW TRANSVERSE       CHOLECYSTECTOMY       DILATION AND CURETTAGE, HYSTEROSCOPY DIAGNOSTIC, COMBINED  16    Menometrorrhagia       Social History     Tobacco Use     Smoking status: Former Smoker     Years: 10.00     Last attempt to quit: 1995     Years since quittin.9     Smokeless tobacco: Never Used     Tobacco comment: lives in smoke free household   Substance Use Topics     Alcohol use: No     Family History   Problem Relation Age of  Onset     Diabetes Mother 78     Hypertension Mother      Thyroid Disease Mother      Cancer Mother 78        breast-mastectomy     Circulatory Mother         DVT     Breast Cancer Mother 79     Hypertension Father      Cerebrovascular Disease Father      Heart Disease Father         CHF     Hypertension Sister      Other Cancer Sister         Carcinoid Cancer     Breast Cancer Maternal Aunt         postmenopausal     Macular Degeneration Paternal Aunt      Breast Cancer Maternal Aunt         postmenopausal     Gastrointestinal Disease Sister         gastric carcinoid tumors with mets to liver     Glaucoma No family hx of          Allergies   Allergen Reactions     Nkda [No Known Drug Allergies]      BP Readings from Last 3 Encounters:   12/20/18 118/72   10/24/18 138/86   10/02/18 120/83    Wt Readings from Last 3 Encounters:   12/20/18 68.9 kg (152 lb)   10/24/18 69.3 kg (152 lb 11.2 oz)   10/02/18 69.9 kg (154 lb)                    Reviewed and updated as needed this visit by clinical staff       Reviewed and updated as needed this visit by Provider         ROS:  Constitutional, HEENT, cardiovascular, pulmonary, GI, , musculoskeletal, neuro, skin, endocrine and psych systems are negative, except as otherwise noted.    OBJECTIVE:     /72   Pulse 78   Temp 97.8  F (36.6  C) (Oral)   Resp 14   Wt 68.9 kg (152 lb)   LMP  (LMP Unknown)   SpO2 100%   BMI 21.50 kg/m    Body mass index is 21.5 kg/m .  GENERAL: healthy, alert and no distress  PSYCH: mentation appears normal, affect normal/bright    Diagnostic Test Results:  none     ASSESSMENT/PLAN:         1. Migraine with aura and without status migrainosus, not intractable  Trial of maxalt given. Side effects and how to take the medication discussed.  Plan follow up with her primary provider if needed.   - rizatriptan (MAXALT) 10 MG tablet; Take 1 tablet (10 mg) by mouth at onset of headache for migraine May repeat in 2 hours. Max 3 tablets/24 hours.   Dispense: 18 tablet; Refill: 3      Kristen M. Kehr, PA-C  [facility]

## 2018-12-20 NOTE — NURSING NOTE
"Chief Complaint   Patient presents with     Headache       Initial /80   Pulse 78   Temp 97.8  F (36.6  C) (Oral)   Resp 14   Wt 68.9 kg (152 lb)   LMP  (LMP Unknown)   SpO2 100%   BMI 21.50 kg/m   Estimated body mass index is 21.5 kg/m  as calculated from the following:    Height as of 10/2/18: 1.791 m (5' 10.5\").    Weight as of this encounter: 68.9 kg (152 lb).  Medication Reconciliation: complete    SOFIA Diaz MA    "

## 2018-12-21 DIAGNOSIS — E83.19 IRON EXCESS: ICD-10-CM

## 2018-12-21 LAB
FERRITIN SERPL-MCNC: 230 NG/ML (ref 8–252)
IRON SATN MFR SERPL: 87 % (ref 15–46)
IRON SERPL-MCNC: 203 UG/DL (ref 35–180)
TIBC SERPL-MCNC: 233 UG/DL (ref 240–430)

## 2018-12-21 PROCEDURE — 83550 IRON BINDING TEST: CPT | Performed by: INTERNAL MEDICINE

## 2018-12-21 PROCEDURE — 83540 ASSAY OF IRON: CPT | Performed by: INTERNAL MEDICINE

## 2018-12-21 PROCEDURE — 36415 COLL VENOUS BLD VENIPUNCTURE: CPT | Performed by: INTERNAL MEDICINE

## 2018-12-21 PROCEDURE — 82728 ASSAY OF FERRITIN: CPT | Performed by: INTERNAL MEDICINE

## 2019-01-31 ENCOUNTER — OFFICE VISIT (OUTPATIENT)
Dept: OPTOMETRY | Facility: CLINIC | Age: 54
End: 2019-01-31
Payer: COMMERCIAL

## 2019-01-31 DIAGNOSIS — H52.03 HYPEROPIA, BILATERAL: Primary | ICD-10-CM

## 2019-01-31 DIAGNOSIS — H52.4 PRESBYOPIA: ICD-10-CM

## 2019-01-31 PROCEDURE — 92014 COMPRE OPH EXAM EST PT 1/>: CPT | Performed by: OPTOMETRIST

## 2019-01-31 PROCEDURE — 92310 CONTACT LENS FITTING OU: CPT | Mod: GA | Performed by: OPTOMETRIST

## 2019-01-31 PROCEDURE — 92015 DETERMINE REFRACTIVE STATE: CPT | Performed by: OPTOMETRIST

## 2019-01-31 ASSESSMENT — REFRACTION_MANIFEST
OS_AXIS: 055
OS_SPHERE: +2.50
OD_ADD: +2.25
OS_SPHERE: +2.00
OS_CYLINDER: +0.50
OD_SPHERE: +2.75
OD_SPHERE: +2.25
OS_ADD: +2.25
METHOD_AUTOREFRACTION: 1
OD_CYLINDER: SPHERE
OS_CYLINDER: +0.50
OS_AXIS: 71

## 2019-01-31 ASSESSMENT — REFRACTION_CURRENTRX
OD_BASECURVE: 8.6
OD_ADD: LOW
OS_SPHERE: +2.00
OS_ADD: LOW
OS_BASECURVE: 8.6
OD_SPHERE: +2.50
OS_DIAMETER: 14.1
OD_DIAMETER: 14.1

## 2019-01-31 ASSESSMENT — REFRACTION_WEARINGRX
OD_ADD: +2.00
OS_CYLINDER: SPHERE
OD_SPHERE: +2.25
OS_ADD: +2.00
OD_CYLINDER: SPHERE
OS_SPHERE: +2.25
SPECS_TYPE: PAL

## 2019-01-31 ASSESSMENT — VISUAL ACUITY
OS_CC+: -1
OD_CC: 20/25
OS_CC: 20/30-1
OS_CC: 20/25
METHOD: SNELLEN - LINEAR
CORRECTION_TYPE: GLASSES
OD_CC+: -1
OD_CC: 20/30

## 2019-01-31 ASSESSMENT — CONF VISUAL FIELD
OD_NORMAL: 1
METHOD: COUNTING FINGERS
OS_NORMAL: 1

## 2019-01-31 ASSESSMENT — TONOMETRY
OS_IOP_MMHG: 12
OD_IOP_MMHG: 12
IOP_METHOD: APPLANATION

## 2019-01-31 ASSESSMENT — EXTERNAL EXAM - RIGHT EYE: OD_EXAM: NORMAL

## 2019-01-31 ASSESSMENT — SLIT LAMP EXAM - LIDS
COMMENTS: NORMAL
COMMENTS: NORMAL

## 2019-01-31 ASSESSMENT — CUP TO DISC RATIO
OD_RATIO: 0.15
OS_RATIO: 0.15

## 2019-01-31 ASSESSMENT — EXTERNAL EXAM - LEFT EYE: OS_EXAM: NORMAL

## 2019-01-31 NOTE — PROGRESS NOTES
Chief Complaint   Patient presents with     Annual Eye Exam     Contact Lens Re-fitting        Previous contact lens wearer? Yes: Wears Keaton 1 day multifocal Clariti, does not wear often   Comfort of contact lenses :Good   Satisfied with current lenses: Yes        Last Eye Exam: 9/19/2017  Dilated Previously: Yes    What are you currently using to see?  glasses and contacts on occasion     Distance Vision Acuity: Satisfied with vision,no changes     Near Vision Acuity: Satisfied with vision while reading and using computer with glasses    Eye Comfort: good, dryness has gotten better. Has floaters in the left eye that bother her   Do you use eye drops? : Yes: Uses them when she wears contacts. It's a contact lens drop   Occupation or Hobbies:        [employer]      Medical, surgical and family histories reviewed and updated 1/31/2019.       OBJECTIVE: See Ophthalmology exam    ASSESSMENT:    ICD-10-CM    1. Hyperopia, bilateral H52.03 EYE EXAM (SIMPLE-NONBILLABLE)     REFRACTION     CONTACT LENS FITTING,BILAT   2. Presbyopia H52.4 EYE EXAM (SIMPLE-NONBILLABLE)     REFRACTION     CONTACT LENS FITTING,BILAT      PLAN:     Patient Instructions   Patient was advised of today's exam findings.  Optional to fill new glasses prescription, minimal change  Contact lenses prescription released to patient today.  Return in 1 year for eye exam    Meera Roach O.D.  [address] [id number] [phone]    To order your contacts online please log on to [email or web address] .  Go to the link which is found on the [facility] page.    Another option is to call  [phone] to order your contacts.

## 2019-01-31 NOTE — PATIENT INSTRUCTIONS
Patient was advised of today's exam findings.  Optional to fill new glasses prescription, minimal change  Contact lenses prescription released to patient today.  Return in 1 year for eye exam    Meera Roach O.D.  [facility]   [address] [place] [address]  [phone]    To order your contacts online please log on to [email or web address] .  Go to the link which is found on the [facility] page.    Another option is to call  [phone] to order your contacts.

## 2019-02-04 PROBLEM — M25.511 PAIN IN JOINT OF RIGHT SHOULDER: Status: RESOLVED | Noted: 2018-03-01 | Resolved: 2019-02-04

## 2019-02-04 NOTE — PROGRESS NOTES
Emily Fritz was seen 2 times for evaluation and treatment.  Patient did not return for further treatment and current status is unknown.  Due to short treatment duration, no objective or functional changes were made.  Please see goal flow sheet from episode noted date below and initial evaluation for further information.  Linked Episodes   Type: Episode: Status: Noted: Resolved: Last update: Updated by:   PHYSICAL THERAPY R shoulder pain 3/1/2018 Active 3/1/2018  3/7/2018  9:32 AM Nanci Carson, PT      Comments:

## 2019-02-11 ENCOUNTER — TELEPHONE (OUTPATIENT)
Dept: FAMILY MEDICINE | Facility: CLINIC | Age: 54
End: 2019-02-11

## 2019-02-11 NOTE — TELEPHONE ENCOUNTER
Call to patient, symptoms began 3 days ago.  Nasal congestion, cough,intermittently productive.  Afebrile. Speaking in full clear sentences.  Denies shortness of breath, dyspnea on exertion, wheezing.  Advise home cares, push fluids, humidifier/vaporizer, for moisture which will help thin secretions, be certain to clean per manufacturers instructions.   Rest, Mucinex plain or DM.   If symptoms worsen or do not improve, patient will be seen, shortness of breath, wheezing, fever, unable to speak in full clear sentences, will be seen immediately.  Patient/parent verbalized understanding of instructions provided and agreed with the plan of care  Toyin Ospina RN

## 2019-02-11 NOTE — TELEPHONE ENCOUNTER
Patient is calling to speak with nurse about her symptoms, has a cough she believes has gone into chest wondering if she needs to be seen    Please call to advice  Thank you

## 2019-02-12 ENCOUNTER — OFFICE VISIT (OUTPATIENT)
Dept: FAMILY MEDICINE | Facility: CLINIC | Age: 54
End: 2019-02-12
Payer: COMMERCIAL

## 2019-02-12 VITALS
DIASTOLIC BLOOD PRESSURE: 86 MMHG | HEART RATE: 89 BPM | HEIGHT: 70 IN | BODY MASS INDEX: 21.62 KG/M2 | SYSTOLIC BLOOD PRESSURE: 142 MMHG | WEIGHT: 151 LBS | TEMPERATURE: 99.1 F | OXYGEN SATURATION: 97 %

## 2019-02-12 DIAGNOSIS — J98.8 VIRAL RESPIRATORY ILLNESS: ICD-10-CM

## 2019-02-12 DIAGNOSIS — Z87.09 H/O ALLERGIC RHINITIS: ICD-10-CM

## 2019-02-12 DIAGNOSIS — B97.89 VIRAL RESPIRATORY ILLNESS: ICD-10-CM

## 2019-02-12 DIAGNOSIS — R07.0 THROAT PAIN: Primary | ICD-10-CM

## 2019-02-12 LAB
DEPRECATED S PYO AG THROAT QL EIA: NORMAL
FLUAV+FLUBV AG SPEC QL: NEGATIVE
FLUAV+FLUBV AG SPEC QL: NEGATIVE
SPECIMEN SOURCE: NORMAL
SPECIMEN SOURCE: NORMAL

## 2019-02-12 PROCEDURE — 87880 STREP A ASSAY W/OPTIC: CPT | Performed by: PHYSICIAN ASSISTANT

## 2019-02-12 PROCEDURE — 87081 CULTURE SCREEN ONLY: CPT | Performed by: PHYSICIAN ASSISTANT

## 2019-02-12 PROCEDURE — 87804 INFLUENZA ASSAY W/OPTIC: CPT | Performed by: PHYSICIAN ASSISTANT

## 2019-02-12 PROCEDURE — 99213 OFFICE O/P EST LOW 20 MIN: CPT | Performed by: PHYSICIAN ASSISTANT

## 2019-02-12 ASSESSMENT — MIFFLIN-ST. JEOR: SCORE: 1370.18

## 2019-02-12 NOTE — PROGRESS NOTES
SUBJECTIVE:   Emily Fritz is a 53 year old female who presents to clinic today for the following health issues:      RESPIRATORY SYMPTOMS      Duration: 4-5 days     Description  cough, chills, fatigue/malaise and sore throat     Severity: moderate    Accompanying signs and symptoms: None    History (predisposing factors): coworkers     Precipitating or alleviating factors: None    Therapies tried and outcome:  rest and fluids guaifenesin  2nd day had chills and possible fever  No vomiting or diarrhea. No rash.    Allergies   Allergen Reactions     Nkda [No Known Drug Allergies]        Past Medical History:   Diagnosis Date     Allergic rhinitis due to animal dander      Chronic constipation      Diagnostic skin and sensitization tests 2/13/12 skin tests pos. for: dog(+)/DM/CR     Family history of breast cancer in mother 12/10/2014     Family history of colonic polyps 12/10/2014     Family history of colonic polyps 12/10/2014     Heart palpitations 3/2009    Holter     House dust mite allergy      Lateral epicondylitis 3/28/2012     Rhinitis, allergic to other allergen          Current Outpatient Medications on File Prior to Visit:  conjugated estrogens (PREMARIN) cream Place 1 g vaginally See Admin Instructions Place 1 gram vaginally at night for 2 weeks, then reduce to twice weekly   [employer] PURELAX powder    omeprazole (PRILOSEC) 20 MG CR capsule Take 1 capsule (20 mg) by mouth daily   rizatriptan (MAXALT) 10 MG tablet Take 1 tablet (10 mg) by mouth at onset of headache for migraine May repeat in 2 hours. Max 3 tablets/24 hours.   azelastine (ASTELIN) 0.1 % spray Spray 1-2 sprays into both nostrils 2 times daily (Patient not taking: Reported on 2/12/2019)   fluticasone (FLONASE) 50 MCG/ACT nasal spray Spray 2 sprays into both nostrils daily (Patient not taking: Reported on 2/12/2019)     No current facility-administered medications on file prior to visit.     Social History     Tobacco Use     Smoking  "status: Former Smoker     Years: 10.00     Last attempt to quit: 1995     Years since quittin.1     Smokeless tobacco: Never Used     Tobacco comment: lives in smoke free household   Substance Use Topics     Alcohol use: No       ROS:  CONSTITUTIONAL: Negative for fatigue or fever.  EYES: Negative for eye problems.  ENT: As above.  RESP: As above.  CV: Negative for chest pains.  GI: Negative for vomiting.  MUSCULOSKELETAL:  Negative for significant muscle or joint pains.  NEUROLOGIC: Negative for headaches.  SKIN: Negative for rash.    OBJECTIVE:  /86   Pulse 89   Temp 99.1  F (37.3  C) (Oral)   Ht 1.778 m (5' 10\")   Wt 68.5 kg (151 lb)   LMP  (LMP Unknown)   SpO2 97%   Breastfeeding? No   BMI 21.67 kg/m    GENERAL APPEARANCE: Healthy, alert and no distress.  EYES:Conjunctiva/sclera clear.  EARS: No cerumen.   Ear canals w/o erythema.  TM's intact w/o erythema.    NOSE/MOUTH: Nose without ulcers, erythema or lesions.  SINUSES: No maxillary sinus tenderness.  THROAT: Mild erythema w/o tonsillar enlargement . No exudates.  NECK: Supple, nontender, no lymphadenopathy.  RESP: Lungs clear to auscultation - no rales, rhonchi or wheezes  CV: Regular rate and rhythm, normal S1 S2, no murmur noted.  NEURO: Awake, alert    SKIN: No rashes    Results for orders placed or performed in visit on 19   Strep, Rapid Screen   Result Value Ref Range    Specimen Description Throat     Rapid Strep A Screen       NEGATIVE: No Group A streptococcal antigen detected by immunoassay, await culture report.   Influenza A/B antigen   Result Value Ref Range    Influenza A/B Agn Specimen Nasal     Influenza A Negative NEG^Negative    Influenza B Negative NEG^Negative         ASSESSMENT:     ICD-10-CM    1. Throat pain R07.0 Strep, Rapid Screen     Beta strep group A culture     Influenza A/B antigen   2. Viral respiratory illness J98.8     B97.89    3. H/O allergic rhinitis Z87.09        PLAN:Likely viral. Monitor " temp. F/U if not better by end of week.  Lots of rest and fluids.  RTC if any worsening symptoms or if not improving.    Emily Teague PA-C

## 2019-02-13 LAB
BACTERIA SPEC CULT: NORMAL
SPECIMEN SOURCE: NORMAL

## 2019-02-28 ENCOUNTER — OFFICE VISIT (OUTPATIENT)
Dept: FAMILY MEDICINE | Facility: CLINIC | Age: 54
End: 2019-02-28
Payer: COMMERCIAL

## 2019-02-28 VITALS
TEMPERATURE: 98.1 F | HEART RATE: 87 BPM | RESPIRATION RATE: 20 BRPM | OXYGEN SATURATION: 99 % | WEIGHT: 152 LBS | BODY MASS INDEX: 21.81 KG/M2 | DIASTOLIC BLOOD PRESSURE: 89 MMHG | SYSTOLIC BLOOD PRESSURE: 136 MMHG

## 2019-02-28 DIAGNOSIS — R07.81 RIB PAIN ON RIGHT SIDE: ICD-10-CM

## 2019-02-28 DIAGNOSIS — J20.9 ACUTE BRONCHITIS WITH SYMPTOMS > 10 DAYS: ICD-10-CM

## 2019-02-28 DIAGNOSIS — Z11.4 SCREENING FOR HIV (HUMAN IMMUNODEFICIENCY VIRUS): Primary | ICD-10-CM

## 2019-02-28 PROCEDURE — 99213 OFFICE O/P EST LOW 20 MIN: CPT | Performed by: FAMILY MEDICINE

## 2019-02-28 RX ORDER — AMOXICILLIN 875 MG
875 TABLET ORAL 2 TIMES DAILY
Qty: 20 TABLET | Refills: 0 | Status: SHIPPED | OUTPATIENT
Start: 2019-02-28 | End: 2019-04-24

## 2019-02-28 RX ORDER — BENZONATATE 200 MG/1
200 CAPSULE ORAL 3 TIMES DAILY PRN
Qty: 30 CAPSULE | Refills: 0 | Status: SHIPPED | OUTPATIENT
Start: 2019-02-28 | End: 2019-04-24

## 2019-02-28 ASSESSMENT — PAIN SCALES - GENERAL: PAINLEVEL: NO PAIN (0)

## 2019-02-28 NOTE — NURSING NOTE
"Chief Complaint   Patient presents with     URI     seen 2/12, going on since 2/7. pain when she breathes started this week     Health Maintenance     order pended       Initial /89   Pulse 87   Temp 98.1  F (36.7  C) (Oral)   Resp 20   Wt 68.9 kg (152 lb)   LMP  (LMP Unknown)   SpO2 99%   BMI 21.81 kg/m   Estimated body mass index is 21.81 kg/m  as calculated from the following:    Height as of 2/12/19: 1.778 m (5' 10\").    Weight as of this encounter: 68.9 kg (152 lb).  Medication Reconciliation: complete  [place], UZMA  "

## 2019-02-28 NOTE — PROGRESS NOTES
SUBJECTIVE:   Emily Fritz is a 53 year old female presenting with a chief complaint of right sided chest pain.    The patient was seen on 2-12 and was diagnosed with a viral UPPER RESPIRATORY INFECTION.    She was not treated at the time. She was gettig better but then she started having pain on the right side of her chest     She is coughing occasionally now.    She is coughin up loose pleghm which is a light green     She denies any fevers, chills, recently    She denies shortness of breath           The patient has the following predisposing factors for infection:None.    Patient Active Problem List   Diagnosis     CARDIOVASCULAR SCREENING; LDL GOAL LESS THAN 160     Cholelithiasis     Diagnostic skin and sensitization tests     Allergic rhinitis due to animal dander     Rhinitis, allergic to other allergen     House dust mite allergy     Lateral epicondylitis     Vitamin D deficiency disease     Heart palpitations     Family history of colonic polyps     Family history of breast cancer in mother     Gastroesophageal reflux disease without esophagitis     Menorrhagia with regular cycle     Dry eyes     Migraine with aura and without status migrainosus, not intractable     Current Outpatient Medications   Medication Sig Dispense Refill     azelastine (ASTELIN) 0.1 % spray Spray 1-2 sprays into both nostrils 2 times daily 3 Bottle 0     conjugated estrogens (PREMARIN) cream Place 1 g vaginally See Admin Instructions Place 1 gram vaginally at night for 2 weeks, then reduce to twice weekly 30 g 12     [facility] PURELAX powder   6     fluticasone (FLONASE) 50 MCG/ACT nasal spray Spray 2 sprays into both nostrils daily 16 g 1     omeprazole (PRILOSEC) 20 MG CR capsule Take 1 capsule (20 mg) by mouth daily 90 capsule 3     rizatriptan (MAXALT) 10 MG tablet Take 1 tablet (10 mg) by mouth at onset of headache for migraine May repeat in 2 hours. Max 3 tablets/24 hours. 18 tablet 3     Social History     Tobacco Use      Smoking status: Former Smoker     Years: 10.00     Last attempt to quit: 1995     Years since quittin.1     Smokeless tobacco: Never Used     Tobacco comment: lives in smoke free household   Substance Use Topics     Alcohol use: No           OBJECTIVE  :/89   Pulse 87   Temp 98.1  F (36.7  C) (Oral)   Resp 20   Wt 68.9 kg (152 lb)   LMP  (LMP Unknown)   SpO2 99%   BMI 21.81 kg/m    GENERAL APPEARANCE: healthy, alert and no distress  EYES: EOMI,  PERRL, conjunctiva clear  HENT: ear canals and TM's normal.  Nose and mouth without ulcers, erythema or lesions  NECK: supple, nontender, no lymphadenopathy  RESP: lungs clear to auscultation - no rales, rhonchi or wheezes  right chest wall minimally tender  CV: regular rates and rhythm, normal S1 S2, no murmur noted  ABDOMEN:  soft, nontender, no HSM or masses and bowel sounds normal  NEURO: Normal strength and tone, sensory exam grossly normal,  normal speech and mentation  SKIN: no suspicious lesions or rashes    ASSESSMENT:  Bronchitis    PLAN:  I recommended that the patient get lots of fluids and rest., A prescription for Tessalon Pearles was given and A prescription for amoxicillin  was given    During the visit I did wear a mask the entire time I was in the exam room with the patient.

## 2019-04-08 ENCOUNTER — DOCUMENTATION ONLY (OUTPATIENT)
Dept: LAB | Facility: CLINIC | Age: 54
End: 2019-04-08

## 2019-04-09 DIAGNOSIS — E83.19 IRON EXCESS: Primary | ICD-10-CM

## 2019-04-10 DIAGNOSIS — E83.19 IRON EXCESS: ICD-10-CM

## 2019-04-10 LAB
FERRITIN SERPL-MCNC: 240 NG/ML (ref 8–252)
IRON SATN MFR SERPL: 72 % (ref 15–46)
IRON SERPL-MCNC: 165 UG/DL (ref 35–180)
TIBC SERPL-MCNC: 229 UG/DL (ref 240–430)

## 2019-04-10 PROCEDURE — 83550 IRON BINDING TEST: CPT | Performed by: INTERNAL MEDICINE

## 2019-04-10 PROCEDURE — 82728 ASSAY OF FERRITIN: CPT | Performed by: INTERNAL MEDICINE

## 2019-04-10 PROCEDURE — 83540 ASSAY OF IRON: CPT | Performed by: INTERNAL MEDICINE

## 2019-04-10 PROCEDURE — 36415 COLL VENOUS BLD VENIPUNCTURE: CPT | Performed by: INTERNAL MEDICINE

## 2019-04-24 ENCOUNTER — OFFICE VISIT (OUTPATIENT)
Dept: FAMILY MEDICINE | Facility: CLINIC | Age: 54
End: 2019-04-24
Payer: COMMERCIAL

## 2019-04-24 VITALS
SYSTOLIC BLOOD PRESSURE: 129 MMHG | TEMPERATURE: 97.8 F | HEART RATE: 83 BPM | DIASTOLIC BLOOD PRESSURE: 83 MMHG | OXYGEN SATURATION: 99 % | BODY MASS INDEX: 21.81 KG/M2 | WEIGHT: 152 LBS

## 2019-04-24 DIAGNOSIS — Z13.1 SCREENING FOR DIABETES MELLITUS: ICD-10-CM

## 2019-04-24 DIAGNOSIS — Z00.00 ROUTINE GENERAL MEDICAL EXAMINATION AT A HEALTH CARE FACILITY: Primary | ICD-10-CM

## 2019-04-24 DIAGNOSIS — Z12.31 VISIT FOR SCREENING MAMMOGRAM: ICD-10-CM

## 2019-04-24 DIAGNOSIS — Z13.220 LIPID SCREENING: ICD-10-CM

## 2019-04-24 PROCEDURE — 99396 PREV VISIT EST AGE 40-64: CPT | Performed by: PHYSICIAN ASSISTANT

## 2019-04-24 ASSESSMENT — ENCOUNTER SYMPTOMS
COUGH: 0
HEARTBURN: 0
BREAST MASS: 0
SHORTNESS OF BREATH: 0
NERVOUS/ANXIOUS: 0
WEAKNESS: 0
PARESTHESIAS: 0
NAUSEA: 0
FREQUENCY: 0
HEMATOCHEZIA: 0
MYALGIAS: 1
DYSURIA: 0
EYE PAIN: 0
PALPITATIONS: 0
CONSTIPATION: 1
HEADACHES: 1
ABDOMINAL PAIN: 0
DIARRHEA: 0
JOINT SWELLING: 0
ARTHRALGIAS: 1
HEMATURIA: 0
CHILLS: 0
FEVER: 0
SORE THROAT: 0
DIZZINESS: 0

## 2019-04-24 ASSESSMENT — PAIN SCALES - GENERAL: PAINLEVEL: NO PAIN (0)

## 2019-04-24 NOTE — PROGRESS NOTES
SUBJECTIVE:   CC: Emily Fritz is an 53 year old woman who presents for preventive health visit.     Healthy Habits:     Getting at least 3 servings of Calcium per day:  Yes    Bi-annual eye exam:  Yes    Dental care twice a year:  Yes    Sleep apnea or symptoms of sleep apnea:  None    Diet:  Regular (no restrictions)    Frequency of exercise:  2-3 days/week    Duration of exercise:  15-30 minutes    Taking medications regularly:  Yes    Medication side effects:  Not applicable    PHQ-2 Total Score: 0    Additional concerns today:  No          Today's PHQ-2 Score:   PHQ-2 (  [employer]) 2019   Q1: Little interest or pleasure in doing things 0   Q2: Feeling down, depressed or hopeless 0   PHQ-2 Score 0   Q1: Little interest or pleasure in doing things Not at all   Q2: Feeling down, depressed or hopeless Not at all   PHQ-2 Score 0       Abuse: Current or Past(Physical, Sexual or Emotional)- No  Do you feel safe in your environment? Yes    Social History     Tobacco Use     Smoking status: Former Smoker     Years: 10.00     Last attempt to quit: 1995     Years since quittin.3     Smokeless tobacco: Never Used     Tobacco comment: lives in smoke free household   Substance Use Topics     Alcohol use: Yes     If you drink alcohol do you typically have >3 drinks per day or >7 drinks per week? No    Alcohol Use 2019   Prescreen: >3 drinks/day or >7 drinks/week? No   Prescreen: >3 drinks/day or >7 drinks/week? -       Reviewed orders with patient.  Reviewed health maintenance and updated orders accordingly - Yes  BP Readings from Last 3 Encounters:   19 129/83   19 136/89   19 142/86    Wt Readings from Last 3 Encounters:   19 68.9 kg (152 lb)   19 68.9 kg (152 lb)   19 68.5 kg (151 lb)                  Patient Active Problem List   Diagnosis     CARDIOVASCULAR SCREENING; LDL GOAL LESS THAN 160     Cholelithiasis     Diagnostic skin and sensitization tests      Allergic rhinitis due to animal dander     Rhinitis, allergic to other allergen     House dust mite allergy     Lateral epicondylitis     Vitamin D deficiency disease     Heart palpitations     Family history of colonic polyps     Family history of breast cancer in mother     Gastroesophageal reflux disease without esophagitis     Menorrhagia with regular cycle     Dry eyes     Migraine with aura and without status migrainosus, not intractable     Past Surgical History:   Procedure Laterality Date     C/SECTION, LOW TRANSVERSE  1988     CHOLECYSTECTOMY  2010     DILATION AND CURETTAGE, HYSTEROSCOPY DIAGNOSTIC, COMBINED  16    Menometrorrhagia       Social History     Tobacco Use     Smoking status: Former Smoker     Years: 10.00     Last attempt to quit: 1995     Years since quittin.3     Smokeless tobacco: Never Used     Tobacco comment: lives in smoke free household   Substance Use Topics     Alcohol use: Yes     Family History   Problem Relation Age of Onset     Diabetes Mother 78     Hypertension Mother      Thyroid Disease Mother      Cancer Mother 78        breast-mastectomy     Circulatory Mother         DVT     Breast Cancer Mother 79     Hypertension Father      Cerebrovascular Disease Father      Heart Disease Father         CHF     Hypertension Sister      Other Cancer Sister         Carcinoid Cancer     Breast Cancer Maternal Aunt         postmenopausal     Macular Degeneration Paternal Aunt      Breast Cancer Maternal Aunt         postmenopausal     Gastrointestinal Disease Sister         gastric carcinoid tumors with mets to liver     Glaucoma No family hx of          Current Outpatient Medications   Medication Sig Dispense Refill     azelastine (ASTELIN) 0.1 % spray Spray 1-2 sprays into both nostrils 2 times daily 3 Bottle 0     conjugated estrogens (PREMARIN) cream Place 1 g vaginally See Admin Instructions Place 1 gram vaginally at night for 2 weeks, then reduce to twice weekly 30 g  12     [facility] PURELAX powder   6     fluticasone (FLONASE) 50 MCG/ACT nasal spray Spray 2 sprays into both nostrils daily 16 g 1     omeprazole (PRILOSEC) 20 MG CR capsule Take 1 capsule (20 mg) by mouth daily 90 capsule 3     rizatriptan (MAXALT) 10 MG tablet Take 1 tablet (10 mg) by mouth at onset of headache for migraine May repeat in 2 hours. Max 3 tablets/24 hours. 18 tablet 3     Allergies   Allergen Reactions     Nkda [No Known Drug Allergies]        Mammogram Screening: Patient over age 50, mutual decision to screen reflected in health maintenance.    Pertinent mammograms are reviewed under the imaging tab.  History of abnormal Pap smear:   NO - age 30-65 PAP every 5 years with negative HPV co-testing recommended  Last 3 Pap and HPV Results:   PAP / HPV Latest Ref Rng & Units 11/22/2017 12/10/2014 4/12/2011   PAP - NIL NIL NIL   HPV 16 DNA NEG:Negative Negative - -   HPV 18 DNA NEG:Negative Negative - -   OTHER HR HPV NEG:Negative Negative - -     PAP / HPV Latest Ref Rng & Units 11/22/2017 12/10/2014 4/12/2011   PAP - NIL NIL NIL   HPV 16 DNA NEG:Negative Negative - -   HPV 18 DNA NEG:Negative Negative - -   OTHER HR HPV NEG:Negative Negative - -     Reviewed and updated as needed this visit by clinical staff  Tobacco  Allergies  Meds  Med Hx  Surg Hx  Fam Hx  Soc Hx        Reviewed and updated as needed this visit by Provider        Past Medical History:   Diagnosis Date     Allergic rhinitis due to animal dander      Chronic constipation      Diagnostic skin and sensitization tests 2/13/12 skin tests pos. for: dog(+)/DM/CR     Family history of breast cancer in mother 12/10/2014     Family history of breast cancer in mother      Family history of colonic polyps 12/10/2014     Family history of colonic polyps 12/10/2014     Family history of colonic polyps      Heart palpitations 3/2009    Holter     House dust mite allergy      Lateral epicondylitis 3/28/2012     Rhinitis, allergic to other allergen        Past Surgical History:   Procedure Laterality Date     C/SECTION, LOW TRANSVERSE  1988     CHOLECYSTECTOMY  2010     DILATION AND CURETTAGE, HYSTEROSCOPY DIAGNOSTIC, COMBINED  8/25/16    Menometrorrhagia       Review of Systems   Constitutional: Negative for chills and fever.   HENT: Negative for congestion, ear pain, hearing loss and sore throat.    Eyes: Negative for pain and visual disturbance.   Respiratory: Negative for cough and shortness of breath.    Cardiovascular: Negative for chest pain, palpitations and peripheral edema.   Gastrointestinal: Positive for constipation (using miralax). Negative for abdominal pain, diarrhea, heartburn, hematochezia and nausea.   Breasts:  Negative for tenderness, breast mass and discharge.   Genitourinary: Negative for dysuria, frequency, genital sores, hematuria, pelvic pain, urgency, vaginal bleeding and vaginal discharge.   Musculoskeletal: Positive for arthralgias (general arthritis) and myalgias (arthritis). Negative for joint swelling.   Skin: Negative for rash.   Neurological: Positive for headaches (migraines well controlled with use of maxalt). Negative for dizziness, weakness and paresthesias.   Psychiatric/Behavioral: The patient is not nervous/anxious.           OBJECTIVE:   /83   Pulse 83   Temp 97.8  F (36.6  C) (Oral)   Wt 68.9 kg (152 lb)   LMP  (LMP Unknown)   SpO2 99%   BMI 21.81 kg/m    Physical Exam  GENERAL: healthy, alert and no distress  EYES: Eyes grossly normal to inspection, PERRL and conjunctivae and sclerae normal  HENT: ear canals and TM's normal, nose and mouth without ulcers or lesions  NECK: no adenopathy, no asymmetry, masses, or scars and thyroid normal to palpation  RESP: lungs clear to auscultation - no rales, rhonchi or wheezes  BREAST: normal without masses, tenderness or nipple discharge and no palpable axillary masses or adenopathy  CV: regular rate and rhythm, normal S1 S2, no S3 or S4, no murmur, click or rub, no  "peripheral edema and peripheral pulses strong  ABDOMEN: soft, nontender, no hepatosplenomegaly, no masses and bowel sounds normal  MS: no gross musculoskeletal defects noted, no edema  SKIN: no suspicious lesions or rashes  NEURO: Normal strength and tone, mentation intact and speech normal  PSYCH: mentation appears normal, affect normal/bright    Diagnostic Test Results:  none     ASSESSMENT/PLAN:   1. Routine general medical examination at a health care facility  Health maintenance reviewed and updated.  She will check insurance for shingrix and get vaccine at the pharmacy.     2. Visit for screening mammogram  - MA SCREENING DIGITAL BILAT - Future  (s+30); Future    3. Lipid screening  - Lipid panel reflex to direct LDL Fasting; Future    4. Screening for diabetes mellitus  - **Glucose FUTURE anytime; Future    COUNSELING:  Reviewed preventive health counseling, as reflected in patient instructions       Regular exercise       Healthy diet/nutrition       Colon cancer screening    BP Readings from Last 1 Encounters:   04/24/19 129/83     Estimated body mass index is 21.81 kg/m  as calculated from the following:    Height as of 2/12/19: 1.778 m (5' 10\").    Weight as of this encounter: 68.9 kg (152 lb).           reports that she quit smoking about 24 years ago. She quit after 10.00 years of use. She has never used smokeless tobacco.      Counseling Resources:  ATP IV Guidelines  Pooled Cohorts Equation Calculator  Breast Cancer Risk Calculator  FRAX Risk Assessment  ICSI Preventive Guidelines  Dietary Guidelines for Americans, 2010  USDA's MyPlate  ASA Prophylaxis  Lung CA Screening    Kristen M. Kehr, PA-C  [facility]  "

## 2019-04-24 NOTE — NURSING NOTE
"Chief Complaint   Patient presents with     Physical       Initial /83   Pulse 83   Temp 97.8  F (36.6  C) (Oral)   Wt 68.9 kg (152 lb)   LMP  (LMP Unknown)   SpO2 99%   BMI 21.81 kg/m   Estimated body mass index is 21.81 kg/m  as calculated from the following:    Height as of 2/12/19: 1.778 m (5' 10\").    Weight as of this encounter: 68.9 kg (152 lb).  Medication Reconciliation: complete    SOFIA Diaz MA    "

## 2019-05-14 DIAGNOSIS — Z13.220 LIPID SCREENING: ICD-10-CM

## 2019-05-14 DIAGNOSIS — Z13.1 SCREENING FOR DIABETES MELLITUS: ICD-10-CM

## 2019-05-14 LAB
CHOLEST SERPL-MCNC: 158 MG/DL
GLUCOSE SERPL-MCNC: 87 MG/DL (ref 70–99)
HDLC SERPL-MCNC: 70 MG/DL
LDLC SERPL CALC-MCNC: 78 MG/DL
NONHDLC SERPL-MCNC: 88 MG/DL
TRIGL SERPL-MCNC: 48 MG/DL

## 2019-05-14 PROCEDURE — 36415 COLL VENOUS BLD VENIPUNCTURE: CPT | Performed by: PHYSICIAN ASSISTANT

## 2019-05-14 PROCEDURE — 82947 ASSAY GLUCOSE BLOOD QUANT: CPT | Performed by: PHYSICIAN ASSISTANT

## 2019-05-14 PROCEDURE — 80061 LIPID PANEL: CPT | Performed by: PHYSICIAN ASSISTANT

## 2019-05-28 ENCOUNTER — MYC REFILL (OUTPATIENT)
Dept: FAMILY MEDICINE | Facility: CLINIC | Age: 54
End: 2019-05-28

## 2019-05-28 DIAGNOSIS — N95.2 ATROPHIC VAGINITIS: ICD-10-CM

## 2019-05-28 NOTE — TELEPHONE ENCOUNTER
RN unable to refill medication as last refill was over 1 year ago. (Nov 2017 x 1 year).  No mention of this medication at 4/24/19 physical appointment.     Jen DONATON, RN

## 2019-05-30 DIAGNOSIS — Z12.31 VISIT FOR SCREENING MAMMOGRAM: ICD-10-CM

## 2019-05-30 PROCEDURE — 77067 SCR MAMMO BI INCL CAD: CPT | Mod: TC

## 2019-08-20 ENCOUNTER — ANCILLARY PROCEDURE (OUTPATIENT)
Dept: GENERAL RADIOLOGY | Facility: CLINIC | Age: 54
End: 2019-08-20
Attending: PEDIATRICS
Payer: COMMERCIAL

## 2019-08-20 ENCOUNTER — OFFICE VISIT (OUTPATIENT)
Dept: ORTHOPEDICS | Facility: CLINIC | Age: 54
End: 2019-08-20
Payer: COMMERCIAL

## 2019-08-20 VITALS
HEIGHT: 70 IN | SYSTOLIC BLOOD PRESSURE: 118 MMHG | DIASTOLIC BLOOD PRESSURE: 88 MMHG | BODY MASS INDEX: 21.47 KG/M2 | WEIGHT: 150 LBS

## 2019-08-20 DIAGNOSIS — M25.562 ACUTE PAIN OF LEFT KNEE: ICD-10-CM

## 2019-08-20 DIAGNOSIS — M25.562 ACUTE PAIN OF LEFT KNEE: Primary | ICD-10-CM

## 2019-08-20 PROCEDURE — 73562 X-RAY EXAM OF KNEE 3: CPT | Performed by: PEDIATRICS

## 2019-08-20 PROCEDURE — 99203 OFFICE O/P NEW LOW 30 MIN: CPT | Performed by: PEDIATRICS

## 2019-08-20 ASSESSMENT — MIFFLIN-ST. JEOR: SCORE: 1360.65

## 2019-08-20 NOTE — PROGRESS NOTES
Sports Medicine Clinic Visit    PCP: Kehr, Kristen M Kimberly R Lam is a 54 year old female who is seen  as a self referral presenting with left knee pain.  While kneeling for cleaning yesterday, she had pain when she went to stand up.  Feels tightness in her knee, but has not noticed any swelling.   Feels that her knee is unstable with full weight bearing.      Has a trip planned for [place] in 3 weeks, and wanted to make sure her knee can improve by that time.      Injury: kneeling . Noted pain with getting up from kneeling.  Pain more anteromedial, and then some tightness posteriorly.  Has dull ache at rest. With walking, last night had some instances of quite sharp pain, had to stop.  No noted swelling.  Thinks may have had some joint noise, but also had pain.    Location of Pain: left knee, diffuse   Duration of Pain: 1 day(s)  Rating of Pain at worst: 9/10  Rating of Pain Currently: 5/10  Symptoms are better with: Rest  Symptoms are worse with: walking   Additional Features:   Positive: instability   Negative: swelling, bruising, popping, grinding, catching, locking, paresthesias, numbness, weakness, pain in other joints and systemic symptoms  Other evaluation and/or treatments so far consists of: Ice, Tylenol and Ibuprofen  Prior History of related problems: denies     Social History:      Review of Systems  Musculoskeletal: as above  Remainder of review of systems is negative including constitutional, CV, pulmonary, GI, Skin and Neurologic except as noted in HPI or medical history.    Past Medical History:   Diagnosis Date     Allergic rhinitis due to animal dander      Chronic constipation      Diagnostic skin and sensitization tests 2/13/12 skin tests pos. for: dog(+)/DM/CR     Family history of breast cancer in mother 12/10/2014     Family history of breast cancer in mother      Family history of colonic polyps 12/10/2014     Family history of colonic polyps 12/10/2014     Family  history of colonic polyps      Heart palpitations 3/2009    Holter     House dust mite allergy      Lateral epicondylitis 3/28/2012     Rhinitis, allergic to other allergen      Past Surgical History:   Procedure Laterality Date     C/SECTION, LOW TRANSVERSE  1988     CHOLECYSTECTOMY       DILATION AND CURETTAGE, HYSTEROSCOPY DIAGNOSTIC, COMBINED  16    Menometrorrhagia     Family History   Problem Relation Age of Onset     Diabetes Mother 78     Hypertension Mother      Thyroid Disease Mother      Cancer Mother 78        breast-mastectomy     Circulatory Mother         DVT     Breast Cancer Mother 79     Hypertension Father      Cerebrovascular Disease Father      Heart Disease Father         CHF     Hypertension Sister      Other Cancer Sister         Carcinoid Cancer     Breast Cancer Maternal Aunt         postmenopausal     Macular Degeneration Paternal Aunt      Breast Cancer Maternal Aunt         postmenopausal     Gastrointestinal Disease Sister         gastric carcinoid tumors with mets to liver     Glaucoma No family hx of      Social History     Socioeconomic History     Marital status:      Spouse name: Roc     Number of children: 3     Years of education: 14     Highest education level: Not on file   Occupational History     Occupation:  for      Employer: [employer]   Social Needs     Financial resource strain: Not on file     Food insecurity:     Worry: Not on file     Inability: Not on file     Transportation needs:     Medical: Not on file     Non-medical: Not on file   Tobacco Use     Smoking status: Former Smoker     Years: 10.00     Last attempt to quit: 1995     Years since quittin.6     Smokeless tobacco: Never Used     Tobacco comment: lives in smoke free household   Substance and Sexual Activity     Alcohol use: Yes     Drug use: No     Sexual activity: Yes     Partners: Male     Birth control/protection: Male Surgical     Comment:  "vasectomy   Lifestyle     Physical activity:     Days per week: Not on file     Minutes per session: Not on file     Stress: Not on file   Relationships     Social connections:     Talks on phone: Not on file     Gets together: Not on file     Attends [employer] service: Not on file     Active member of club or organization: Not on file     Attends meetings of clubs or organizations: Not on file     Relationship status: Not on file     Intimate partner violence:     Fear of current or ex partner: Not on file     Emotionally abused: Not on file     Physically abused: Not on file     Forced sexual activity: Not on file   Other Topics Concern     Parent/sibling w/ CABG, MI or angioplasty before 65F 55M? No   Social History Narrative     Not on file       Objective  /88   Ht 1.778 m (5' 10\")   Wt 68 kg (150 lb)   LMP  (LMP Unknown)   BMI 21.52 kg/m      GENERAL APPEARANCE: healthy, alert and no distress   GAIT: antalgic  SKIN: no suspicious lesions or rashes  NEURO: Normal strength and tone, mentation intact and speech normal  PSYCH:  mentation appears normal and affect normal/bright  HEENT: no scleral icterus  CV: distal perfusion intact  RESP: nonlabored breathing    Left Knee exam    ROM:        Full active and passive ROM with flexion and extension  Pain end of active extension, tight/pain end of flexion    Inspection:       no visible ecchymosis        effusion noted mild-mod    Skin:       no visible deformities       well perfused       capillary refill brisk    Patellar Motion:        Crepitus noted in the patellofemoral joint    Tender:        medial joint line       Popliteal space  Mild quad tendon, medial patella  No change with patellar translation    Non Tender:         remainder of knee area    Special Tests:        neg (-) Kameron       neg (-) Lachman       neg (-) anterior drawer       neg (-) posterior drawer       neg (-) varus       neg (-) valgus for laxity, some anteromedial pain       + " pain with forced extension      Radiology  Visualized radiographs of left knee obtained today, and reviewed the images with the patient.  Impression: Bilateral PA, bilateral sunrise, and left lateral views of the knees demonstrate no acute osseous abnormality.  Left knee joint effusion is present.  There is a small osteophyte at the right lateral patella, relative to the left.  Joint spaces appear fairly well-preserved.      Assessment:  1. Acute pain of left knee        Plan:  Discussed the assessment with the patient.  See patient instructions below.  For now, focus on icing, OTC medications, monitoring.  If not getting improvement, potential for imaging, versus rehab versus aspiration/injection.  Follow up: Monitor course next 7 to 10 days, contact clinic or follow-up sooner if not improving well, sooner if needed.  If consideration for aspiration and injection, would suggest seeing Dr Marion or Dr Hook for use of ultrasound.  Questions answered. The patient indicates understanding of these issues and agrees with the plan.    Richie Aguilar, DO, CAQ        Patient Instructions   Suspect patellofemoral chondromalacia (early wearing change under the kneecap)    Routine icing, 10-20 minutes at a time  May continue with ibuprofen, dosing on bottle is ok; prescription strength dosing is 800 mg (4 OTC tabs) three times daily with food, for up to 1-2 weeks  May continue with acetaminophen (Tylenol), safe with ibuprofen  May monitor for ~1 week.  If persistent issues in the knee, considerations include:  MRI  Compression  Possible injection of the knee, or possible presumed popliteal cyst aspiration (with use of ultrasound guidance, with one of my colleagues)  Physical therapy    Contact clinic by phone or MyChart if not improving in the next 7-10 days, or wanting to pursue different option.      Disclaimer: This note consists of symbols derived from keyboarding, dictation and/or voice recognition software. As a  result, there may be errors in the script that have gone undetected. Please consider this when interpreting information found in this chart.

## 2019-08-20 NOTE — PATIENT INSTRUCTIONS
Suspect patellofemoral chondromalacia (early wearing change under the kneecap)    Routine icing, 10-20 minutes at a time  May continue with ibuprofen, dosing on bottle is ok; prescription strength dosing is 800 mg (4 OTC tabs) three times daily with food, for up to 1-2 weeks  May continue with acetaminophen (Tylenol), safe with ibuprofen  May monitor for ~1 week.  If persistent issues in the knee, considerations include:  MRI  Compression  Possible injection of the knee, or possible presumed popliteal cyst aspiration (with use of ultrasound guidance, with one of my colleagues)  Physical therapy    Contact clinic by phone or MyChart if not improving in the next 7-10 days, or wanting to pursue different option.

## 2019-08-20 NOTE — LETTER
8/20/2019         RE: Emily Fritz  [address] [facility] [address]        Dear Colleague,    Thank you for referring your patient, Emily Fritz, to the [facility]. Please see a copy of my visit note below.    Sports Medicine Clinic Visit    PCP: Kehr, Kristen M    Emily Fritz is a 54 year old female who is seen  as a self referral presenting with left knee pain.  While kneeling for cleaning yesterday, she had pain when she went to stand up.  Feels tightness in her knee, but has not noticed any swelling.   Feels that her knee is unstable with full weight bearing.      Has a trip planned for [place] in 3 weeks, and wanted to make sure her knee can improve by that time.      Injury: kneeling . Noted pain with getting up from kneeling.  Pain more anteromedial, and then some tightness posteriorly.  Has dull ache at rest. With walking, last night had some instances of quite sharp pain, had to stop.  No noted swelling.  Thinks may have had some joint noise, but also had pain.    Location of Pain: left knee, diffuse   Duration of Pain: 1 day(s)  Rating of Pain at worst: 9/10  Rating of Pain Currently: 5/10  Symptoms are better with: Rest  Symptoms are worse with: walking   Additional Features:   Positive: instability   Negative: swelling, bruising, popping, grinding, catching, locking, paresthesias, numbness, weakness, pain in other joints and systemic symptoms  Other evaluation and/or treatments so far consists of: Ice, Tylenol and Ibuprofen  Prior History of related problems: denies     Social History:      Review of Systems  Musculoskeletal: as above  Remainder of review of systems is negative including constitutional, CV, pulmonary, GI, Skin and Neurologic except as noted in HPI or medical history.    Past Medical History:   Diagnosis Date     Allergic rhinitis due to animal dander      Chronic constipation      Diagnostic skin and  sensitization tests 2/13/12 skin tests pos. for: dog(+)/DM/CR     Family history of breast cancer in mother 12/10/2014     Family history of breast cancer in mother      Family history of colonic polyps 12/10/2014     Family history of colonic polyps 12/10/2014     Family history of colonic polyps      Heart palpitations 3/2009    Holter     House dust mite allergy      Lateral epicondylitis 3/28/2012     Rhinitis, allergic to other allergen      Past Surgical History:   Procedure Laterality Date     C/SECTION, LOW TRANSVERSE  1988     CHOLECYSTECTOMY  2010     DILATION AND CURETTAGE, HYSTEROSCOPY DIAGNOSTIC, COMBINED  8/25/16    Menometrorrhagia     Family History   Problem Relation Age of Onset     Diabetes Mother 78     Hypertension Mother      Thyroid Disease Mother      Cancer Mother 78        breast-mastectomy     Circulatory Mother         DVT     Breast Cancer Mother 79     Hypertension Father      Cerebrovascular Disease Father      Heart Disease Father         CHF     Hypertension Sister      Other Cancer Sister         Carcinoid Cancer     Breast Cancer Maternal Aunt         postmenopausal     Macular Degeneration Paternal Aunt      Breast Cancer Maternal Aunt         postmenopausal     Gastrointestinal Disease Sister         gastric carcinoid tumors with mets to liver     Glaucoma No family hx of      Social History     Socioeconomic History     Marital status:      Spouse name: Roc     Number of children: 3     Years of education: 14     Highest education level: Not on file   Occupational History     Occupation:  for      Employer: [employer]   Social Needs     Financial resource strain: Not on file     Food insecurity:     Worry: Not on file     Inability: Not on file     Transportation needs:     Medical: Not on file     Non-medical: Not on file   Tobacco Use     Smoking status: Former Smoker     Years: 10.00     Last attempt to quit: 1/1/1995     Years  "since quittin.6     Smokeless tobacco: Never Used     Tobacco comment: lives in smoke free household   Substance and Sexual Activity     Alcohol use: Yes     Drug use: No     Sexual activity: Yes     Partners: Male     Birth control/protection: Male Surgical     Comment: vasectomy   Lifestyle     Physical activity:     Days per week: Not on file     Minutes per session: Not on file     Stress: Not on file   Relationships     Social connections:     Talks on phone: Not on file     Gets together: Not on file     Attends Scientologist service: Not on file     Active member of club or organization: Not on file     Attends meetings of clubs or organizations: Not on file     Relationship status: Not on file     Intimate partner violence:     Fear of current or ex partner: Not on file     Emotionally abused: Not on file     Physically abused: Not on file     Forced sexual activity: Not on file   Other Topics Concern     Parent/sibling w/ CABG, MI or angioplasty before 65F 55M? No   Social History Narrative     Not on file       Objective  /88   Ht 1.778 m (5' 10\")   Wt 68 kg (150 lb)   LMP  (LMP Unknown)   BMI 21.52 kg/m       GENERAL APPEARANCE: healthy, alert and no distress   GAIT: antalgic  SKIN: no suspicious lesions or rashes  NEURO: Normal strength and tone, mentation intact and speech normal  PSYCH:  mentation appears normal and affect normal/bright  HEENT: no scleral icterus  CV: distal perfusion intact  RESP: nonlabored breathing    Left Knee exam    ROM:        Full active and passive ROM with flexion and extension  Pain end of active extension, tight/pain end of flexion    Inspection:       no visible ecchymosis        effusion noted mild-mod    Skin:       no visible deformities       well perfused       capillary refill brisk    Patellar Motion:        Crepitus noted in the patellofemoral joint    Tender:        medial joint line       Popliteal space  Mild quad tendon, medial patella  No change " with patellar translation    Non Tender:         remainder of knee area    Special Tests:        neg (-) Kameron       neg (-) Lachman       neg (-) anterior drawer       neg (-) posterior drawer       neg (-) varus       neg (-) valgus for laxity, some anteromedial pain       + pain with forced extension      Radiology  Visualized radiographs of left knee obtained today, and reviewed the images with the patient.  Impression: Bilateral PA, bilateral sunrise, and left lateral views of the knees demonstrate no acute osseous abnormality.  Left knee joint effusion is present.  There is a small osteophyte at the right lateral patella, relative to the left.  Joint spaces appear fairly well-preserved.      Assessment:  1. Acute pain of left knee        Plan:  Discussed the assessment with the patient.  See patient instructions below.  For now, focus on icing, OTC medications, monitoring.  If not getting improvement, potential for imaging, versus rehab versus aspiration/injection.  Follow up: Monitor course next 7 to 10 days, contact clinic or follow-up sooner if not improving well, sooner if needed.  If consideration for aspiration and injection, would suggest seeing Dr Marion or Dr Hook for use of ultrasound.  Questions answered. The patient indicates understanding of these issues and agrees with the plan.    Richie Aguilar, DO, CAQ        Patient Instructions   Suspect patellofemoral chondromalacia (early wearing change under the kneecap)    Routine icing, 10-20 minutes at a time  May continue with ibuprofen, dosing on bottle is ok; prescription strength dosing is 800 mg (4 OTC tabs) three times daily with food, for up to 1-2 weeks  May continue with acetaminophen (Tylenol), safe with ibuprofen  May monitor for ~1 week.  If persistent issues in the knee, considerations include:  MRI  Compression  Possible injection of the knee, or possible presumed popliteal cyst aspiration (with use of ultrasound guidance, with  one of my colleagues)  Physical therapy    Contact clinic by phone or MyChart if not improving in the next 7-10 days, or wanting to pursue different option.      Disclaimer: This note consists of symbols derived from keyboarding, dictation and/or voice recognition software. As a result, there may be errors in the script that have gone undetected. Please consider this when interpreting information found in this chart.        Again, thank you for allowing me to participate in the care of your patient.        Sincerely,        Richie Aguilar, DO

## 2019-08-21 ENCOUNTER — MYC MEDICAL ADVICE (OUTPATIENT)
Dept: FAMILY MEDICINE | Facility: CLINIC | Age: 54
End: 2019-08-21

## 2019-08-22 NOTE — TELEPHONE ENCOUNTER
It is difficult to make a diagnosis from the picture. A rash can develop after a vaccination, but typically it is a local reaction around the site of injection or if it was an allergy, I would expect the rash to be all over her body.   Unlikely from a donation of blood.   I think appropriate to use an anti itch cream or mild cortisone over the counter.   If it gets worse or persists, she should make an appointment to be seen.   Kristen Kehr PA-C

## 2019-08-26 ENCOUNTER — MYC MEDICAL ADVICE (OUTPATIENT)
Dept: ORTHOPEDICS | Facility: CLINIC | Age: 54
End: 2019-08-26

## 2019-08-26 DIAGNOSIS — M25.562 ACUTE PAIN OF LEFT KNEE: Primary | ICD-10-CM

## 2019-08-28 ENCOUNTER — ANCILLARY PROCEDURE (OUTPATIENT)
Dept: MRI IMAGING | Facility: CLINIC | Age: 54
End: 2019-08-28
Attending: PEDIATRICS
Payer: COMMERCIAL

## 2019-08-28 DIAGNOSIS — M25.562 ACUTE PAIN OF LEFT KNEE: ICD-10-CM

## 2019-08-28 PROCEDURE — 73721 MRI JNT OF LWR EXTRE W/O DYE: CPT | Mod: TC

## 2019-08-30 ENCOUNTER — TELEPHONE (OUTPATIENT)
Dept: FAMILY MEDICINE | Facility: CLINIC | Age: 54
End: 2019-08-30

## 2019-08-30 NOTE — TELEPHONE ENCOUNTER
Patient inquired about MRI results.    MR KNEE LEFT WITHOUT CONTRAST August 28, 2019 8:02 AM     HISTORY: Knee pain with effusion, evaluate for derangement. Acute pain  of left knee.      TECHNIQUE: Multiplanar, multisequence without contrast.     FINDINGS:   Medial Meniscus: Horizontal tearing of the posterior horn and body  segment, with inferior extension. There appears to be minimal  perimeniscal cyst formation posteromedially (series 6 image 17).  Mild/moderate extrusion of the body segment.     Lateral Meniscus: No tear, displaced fragment, or extrusion.         Anterior Cruciate Ligament: Unremarkable. No sprain or tear  identified.      Posterior Cruciate Ligament: Unremarkable. No sprain or tear  identified.      Medial Collateral Ligament: No sprain or tear identified.     Lateral Collateral Ligament Complex, Popliteus Tendon: The iliotibial  band, fibular collateral ligament, biceps femoris tendon, and  popliteus tendon are intact.     Osseous and Cartilaginous Structures: Minimal reactive subchondral  edema at the medial aspect of the medial tibial plateau. Grade 1-2  patellar chondromalacia and reactive subchondral edema.     Extensor Mechanism: The quadriceps and patellar tendons are intact.  The medial and lateral patellar retinacula appear unremarkable.     Joint Space: Minimal joint effusion. No definite loose bodies  appreciated.     Additional Findings: Small Baker's cyst with adjacent ill-defined  fluid dissecting caudally. No semimembranosus-tibial collateral  ligament or pes anserine bursitis.                                                                          IMPRESSION:  1. Medial meniscal tearing with minimal perimeniscal cyst formation.  2. Patellar chondromalacia.  3. Small Baker's cyst with leakage/rupture.

## 2019-08-30 NOTE — TELEPHONE ENCOUNTER
Replied to patient. See reply. If she wants to do injection with me, can schedule appointment.  If she wants to do it with imaging guidance, advised her to contact clinic to discuss with ATC.  Richie Aguilar DO, CAQ

## 2019-09-03 NOTE — TELEPHONE ENCOUNTER
Called and spoke with patient.  She was rescheduled for Thursday with Dr. Hook for both aspiration and injection.     Lia Pickard ATC

## 2019-09-03 NOTE — TELEPHONE ENCOUNTER
Pended ortho  if patient would like to try to get in by Monday for US guided injection/aspiration    Ruby Longo MS ATC

## 2019-09-05 ENCOUNTER — OFFICE VISIT (OUTPATIENT)
Dept: ORTHOPEDICS | Facility: CLINIC | Age: 54
End: 2019-09-05
Payer: COMMERCIAL

## 2019-09-05 VITALS — SYSTOLIC BLOOD PRESSURE: 108 MMHG | DIASTOLIC BLOOD PRESSURE: 64 MMHG

## 2019-09-05 DIAGNOSIS — M25.562 ACUTE PAIN OF LEFT KNEE: Primary | ICD-10-CM

## 2019-09-05 PROCEDURE — 99207 ZZC NO CHARGE LOS: CPT | Performed by: FAMILY MEDICINE

## 2019-09-05 NOTE — LETTER
9/5/2019         RE: Emily Fritz  [address] [facility] [address]        Dear Colleague,    Thank you for referring your patient, Emily Fritz, to the [facility]. Please see a copy of my visit note below.    Pt presenting for left knee steroid injection but no pain currently.  She was counseled on limited benefits for steroid injections on pain free knees.  Given this plan to have pt cont previous plan with provider and f/u for injection if pain returns.  Concerning signs/sx that would warrant urgent evaluation were discussed.  All questions were answered, patient understands and agrees with plan.        Again, thank you for allowing me to participate in the care of your patient.        Sincerely,        Ramesh Hook MD

## 2019-09-06 NOTE — PROGRESS NOTES
Pt presenting for left knee steroid injection but no pain currently.  She was counseled on limited benefits for steroid injections on pain free knees.  Given this plan to have pt cont previous plan with provider and f/u for injection if pain returns.  Concerning signs/sx that would warrant urgent evaluation were discussed.  All questions were answered, patient understands and agrees with plan.

## 2019-09-29 ENCOUNTER — HEALTH MAINTENANCE LETTER (OUTPATIENT)
Age: 54
End: 2019-09-29

## 2019-11-05 DIAGNOSIS — E83.19 IRON EXCESS: Primary | ICD-10-CM

## 2019-11-05 DIAGNOSIS — Z83.49 FAMILY HISTORY OF HEMOCHROMATOSIS: ICD-10-CM

## 2019-11-07 DIAGNOSIS — E83.19 IRON EXCESS: ICD-10-CM

## 2019-11-07 DIAGNOSIS — Z83.49 FAMILY HISTORY OF HEMOCHROMATOSIS: ICD-10-CM

## 2019-11-07 LAB
BASOPHILS # BLD AUTO: 0 10E9/L (ref 0–0.2)
BASOPHILS NFR BLD AUTO: 0.6 %
DIFFERENTIAL METHOD BLD: NORMAL
EOSINOPHIL # BLD AUTO: 0.1 10E9/L (ref 0–0.7)
EOSINOPHIL NFR BLD AUTO: 2 %
ERYTHROCYTE [DISTWIDTH] IN BLOOD BY AUTOMATED COUNT: 12.6 % (ref 10–15)
FERRITIN SERPL-MCNC: 159 NG/ML (ref 8–252)
HCT VFR BLD AUTO: 42.3 % (ref 35–47)
HGB BLD-MCNC: 13.8 G/DL (ref 11.7–15.7)
IRON SATN MFR SERPL: 50 % (ref 15–46)
IRON SERPL-MCNC: 117 UG/DL (ref 35–180)
LYMPHOCYTES # BLD AUTO: 1.6 10E9/L (ref 0.8–5.3)
LYMPHOCYTES NFR BLD AUTO: 25.2 %
MCH RBC QN AUTO: 32.5 PG (ref 26.5–33)
MCHC RBC AUTO-ENTMCNC: 32.6 G/DL (ref 31.5–36.5)
MCV RBC AUTO: 100 FL (ref 78–100)
MONOCYTES # BLD AUTO: 0.6 10E9/L (ref 0–1.3)
MONOCYTES NFR BLD AUTO: 8.6 %
NEUTROPHILS # BLD AUTO: 4.1 10E9/L (ref 1.6–8.3)
NEUTROPHILS NFR BLD AUTO: 63.6 %
PLATELET # BLD AUTO: 201 10E9/L (ref 150–450)
RBC # BLD AUTO: 4.24 10E12/L (ref 3.8–5.2)
TIBC SERPL-MCNC: 236 UG/DL (ref 240–430)
WBC # BLD AUTO: 6.5 10E9/L (ref 4–11)

## 2019-11-07 PROCEDURE — 36415 COLL VENOUS BLD VENIPUNCTURE: CPT | Performed by: INTERNAL MEDICINE

## 2019-11-07 PROCEDURE — 83540 ASSAY OF IRON: CPT | Performed by: INTERNAL MEDICINE

## 2019-11-07 PROCEDURE — 85025 COMPLETE CBC W/AUTO DIFF WBC: CPT | Performed by: INTERNAL MEDICINE

## 2019-11-07 PROCEDURE — 82728 ASSAY OF FERRITIN: CPT | Performed by: INTERNAL MEDICINE

## 2019-11-07 PROCEDURE — 83550 IRON BINDING TEST: CPT | Performed by: INTERNAL MEDICINE

## 2020-01-10 ENCOUNTER — E-VISIT (OUTPATIENT)
Dept: FAMILY MEDICINE | Facility: CLINIC | Age: 55
End: 2020-01-10
Payer: COMMERCIAL

## 2020-01-10 DIAGNOSIS — J06.9 VIRAL URI WITH COUGH: Primary | ICD-10-CM

## 2020-01-10 PROCEDURE — 99421 OL DIG E/M SVC 5-10 MIN: CPT | Performed by: PHYSICIAN ASSISTANT

## 2020-01-10 RX ORDER — BENZONATATE 200 MG/1
200 CAPSULE ORAL 3 TIMES DAILY PRN
Qty: 30 CAPSULE | Refills: 0 | Status: SHIPPED | OUTPATIENT
Start: 2020-01-10 | End: 2020-11-02

## 2020-01-10 RX ORDER — ALBUTEROL SULFATE 90 UG/1
2 AEROSOL, METERED RESPIRATORY (INHALATION) EVERY 6 HOURS
Qty: 1 INHALER | Refills: 0 | Status: SHIPPED | OUTPATIENT
Start: 2020-01-10 | End: 2020-02-03

## 2020-01-10 NOTE — PATIENT INSTRUCTIONS

## 2020-01-30 DIAGNOSIS — J06.9 VIRAL URI WITH COUGH: ICD-10-CM

## 2020-01-31 NOTE — TELEPHONE ENCOUNTER
This medication was given for a viral URI 3 weeks ago.   Routing to provider to advise.  Jen Petersen BSN, RN

## 2020-02-03 RX ORDER — ALBUTEROL SULFATE 90 UG/1
2 AEROSOL, METERED RESPIRATORY (INHALATION) EVERY 6 HOURS
Qty: 6.7 INHALER | Refills: 0 | Status: SHIPPED | OUTPATIENT
Start: 2020-02-03 | End: 2021-03-30

## 2020-02-21 ENCOUNTER — MYC MEDICAL ADVICE (OUTPATIENT)
Dept: FAMILY MEDICINE | Facility: CLINIC | Age: 55
End: 2020-02-21

## 2020-02-21 DIAGNOSIS — G43.109 MIGRAINE WITH AURA AND WITHOUT STATUS MIGRAINOSUS, NOT INTRACTABLE: ICD-10-CM

## 2020-02-21 NOTE — TELEPHONE ENCOUNTER
"Routing refill request to provider for review/approval because:  Requested Prescriptions   Pending Prescriptions Disp Refills     rizatriptan (MAXALT) 10 MG PO tablet 18 tablet 3     Sig: Take 1 tablet (10 mg) by mouth at onset of headache for migraine May repeat in 2 hours. Max 3 tablets/24 hours.       Serotonin Agonists Failed - 2/21/2020  1:11 PM        Failed - Serotonin Agonist request needs review.     Please review patient's record. If patient has had 8 or more treatments in the past month, please forward to provider.          Failed - Medication is active on med list        Passed - Blood pressure under 140/90 in past 12 months     BP Readings from Last 3 Encounters:   09/05/19 108/64   08/20/19 118/88   04/24/19 129/83                 Passed - Recent (12 mo) or future (30 days) visit within the authorizing provider's specialty     Patient has had an office visit with the authorizing provider or a provider within the authorizing providers department within the previous 12 mos or has a future within next 30 days. See \"Patient Info\" tab in inbasket, or \"Choose Columns\" in Meds & Orders section of the refill encounter.              Passed - Patient is age 18 or older        Passed - No active pregnancy on record        Passed - No positive pregnancy test in past 12 months                Sade DONATON, RN, CPN          "

## 2020-02-24 RX ORDER — RIZATRIPTAN BENZOATE 10 MG/1
10 TABLET ORAL
Qty: 18 TABLET | Refills: 0 | Status: SHIPPED | OUTPATIENT
Start: 2020-02-24 | End: 2020-05-05

## 2020-04-17 ENCOUNTER — MYC MEDICAL ADVICE (OUTPATIENT)
Dept: FAMILY MEDICINE | Facility: CLINIC | Age: 55
End: 2020-04-17

## 2020-04-17 NOTE — TELEPHONE ENCOUNTER
It looks like there was an ultrasound ordered in 2015 for follow up and the benign appearing cyst is still present with out change. There was no follow up recommended.   Kristen Kehr PA-C

## 2020-04-17 NOTE — TELEPHONE ENCOUNTER
Per review of 11/21/2014 result note by Elizabeth Hay MD, no note regarding the identified 2 cm cyst anterior left lobe of  the liver was found.    Pt is questioning if follow-up is indicated for this liver cyst. Routed to PCP to review and advise.    TANMAY XiaoN, RN

## 2020-05-05 ENCOUNTER — MYC REFILL (OUTPATIENT)
Dept: FAMILY MEDICINE | Facility: CLINIC | Age: 55
End: 2020-05-05

## 2020-05-05 DIAGNOSIS — G43.109 MIGRAINE WITH AURA AND WITHOUT STATUS MIGRAINOSUS, NOT INTRACTABLE: ICD-10-CM

## 2020-05-05 RX ORDER — RIZATRIPTAN BENZOATE 10 MG/1
10 TABLET ORAL
Qty: 18 TABLET | Refills: 0 | Status: SHIPPED | OUTPATIENT
Start: 2020-05-05 | End: 2020-11-02

## 2020-05-05 NOTE — TELEPHONE ENCOUNTER
Routing refill request to provider for review/approval because:  Serotonin Agonist needs review.  Toyin Ospina RN

## 2020-06-02 ENCOUNTER — E-VISIT (OUTPATIENT)
Dept: FAMILY MEDICINE | Facility: CLINIC | Age: 55
End: 2020-06-02
Payer: COMMERCIAL

## 2020-06-02 DIAGNOSIS — Z53.9 ERRONEOUS ENCOUNTER--DISREGARD: Primary | ICD-10-CM

## 2020-06-02 NOTE — TELEPHONE ENCOUNTER
Please contact this patient and have her contact her Sports Medicine provider to discuss further treatment.   Kristen Kehr PA-C

## 2020-07-17 ENCOUNTER — VIRTUAL VISIT (OUTPATIENT)
Dept: FAMILY MEDICINE | Facility: OTHER | Age: 55
End: 2020-07-17
Payer: COMMERCIAL

## 2020-07-17 DIAGNOSIS — Z20.822 SUSPECTED COVID-19 VIRUS INFECTION: Primary | ICD-10-CM

## 2020-07-17 PROCEDURE — 99421 OL DIG E/M SVC 5-10 MIN: CPT | Performed by: PHYSICIAN ASSISTANT

## 2020-07-17 NOTE — PROGRESS NOTES
"Date: 2020 09:25:12  Clinician: Adelina Toro  Clinician NPI: [id number]  Patient: Elsa Fritz  Patient : 1965  Patient Address: [address] [place], [place] [address]  Patient Phone: [phone]  Visit Protocol: URI  Patient Summary:  Elsa is a 55 year old ( : 1965 ) female who initiated a Visit for COVID-19 (Coronavirus) evaluation and screening. When asked the question \"Please sign me up to receive news, health information and promotions from [employer].\", Elsa responded \"No\".    Elsa states her symptoms started 1-2 days ago.   Her symptoms consist of rhinitis, malaise, ear pain, a sore throat, enlarged lymph nodes, and nasal congestion.   Symptom details     Nasal secretions: The color of her mucus is clear.    Sore throat: Elsa reports having moderate throat pain (4-6 on a 10 point pain scale), does not have exudate on her tonsils, and can swallow liquids. The lymph nodes in her neck are enlarged. A rash has not appeared on the skin since the sore throat started.      Elsa denies having wheezing, nausea, teeth pain, ageusia, diarrhea, vomiting, headache, chills, myalgias, anosmia, facial pain or pressure, fever, and cough. She also denies having recent facial or sinus surgery in the past 60 days and taking antibiotic medication in the past month. She is not experiencing dyspnea.   Precipitating events  Within the past week, Elsa has not been exposed to someone with strep throat.   Pertinent COVID-19 (Coronavirus) information  In the past 14 days, Elsa has not worked in a congregate living setting.   She does not work or volunteer as healthcare worker or a  and does not work or volunteer in a healthcare facility.   Elsa also has not lived in a congregate living setting in the past 14 days. She does not live with a healthcare worker.   Elsa has not had a close contact with a laboratory-confirmed COVID-19 patient within 14 days of symptom onset.   Pertinent medical history  Elsa " typically gets a yeast infection when she takes antibiotics. She is not sure if she has used fluconazole (Diflucan) to treat previous yeast infections.   Elsa does not need a return to work/school note.   Weight: 150 lbs   Elsa does not smoke or use smokeless tobacco.   Additional information as reported by the patient (free text): I do feel this could just be allergies but I was around a lot of people this past week due to my sibling passing - not from the virus-   Weight: 150 lbs    MEDICATIONS: No current medications, ALLERGIES: NKDA  Clinician Response:  Dear Elsa,   Your symptoms show that you may have coronavirus (COVID-19). This illness can cause fever, cough and trouble breathing. Many people get a mild case and get better on their own. Some people can get very sick.  What should I do?  We would like to test you for this virus.   1. Please call [phone] to schedule your visit. Explain that you were [facility] have a COVID-19 test. Be ready [facility].  The following will serve as your written order for this COVID Test, ordered by me, for the indication of suspected COVID [Z20.828]: The test will be ordered in [employer], our electronic health record, after you are scheduled. It will show as ordered and authorized by Jose Luis Kwong MD.  Order: COVID-19 (Coronavirus) PCR for SYMPTOMATIC testing from [facility].    2. When it's time for your COVID test:  Stay at least 6 feet away from others. (If someone will drive you to your test, stay in the backseat, as far away from the  as you can.)   Cover your mouth and nose with a mask, tissue or washcloth.  Go straight to the testing site. Don't make any stops on the way there or back.    3.Starting now: Stay home and away from others (self-isolate) until:   You've had no fever---and no medicine that reduces fever---for 3 full days (72 hours). And...  Your other symptoms have gotten better. For example, your cough or breathing has  "improved. And...  At least 10 days have passed since your symptoms started.    During this time, don't leave the house except for testing or medical care.   Stay in your own room, even for meals. Use your own bathroom if you can.   Stay away from others in your home. No hugging, kissing or shaking hands. No visitors.  Don't go to work, school or anywhere else.    Clean \"high touch\" surfaces often (doorknobs, counters, handles, etc.). Use a household cleaning spray or wipes. You'll find a full list of  on the EPA website: www.epa.gov/pesticide-registration/list-n-disinfectants-use-against-sars-cov-2.   Cover your mouth and nose with a mask, tissue or washcloth to avoid spreading germs.  Wash your hands and face often. Use soap and water.  Caregivers in these groups are at risk for severe illness due to COVID-19:  o People 65 years and older  o People who live in a nursing home or long-term care facility  o People with chronic disease (lung, heart, cancer, diabetes, kidney, liver, immunologic)  o People who have a weakened immune system, including those who:   Are in cancer treatment  Take medicine that weakens the immune system, such as corticosteroids  Had a bone marrow or organ transplant  Have an immune deficiency  Have poorly controlled HIV or AIDS  Are obese (body mass index of 40 or higher)  Smoke regularly   o Caregivers should wear gloves while washing dishes, handling laundry and cleaning bedrooms and bathrooms.  o Use caution when washing and drying laundry: Don't shake dirty laundry, and use the warmest water setting that you can.  o For more tips, go to www.cdc.gov/coronavirus/2019-ncov/downloads/10Things.pdf.   4.Sign up for [employer]. We know it's scary to hear that you might have COVID-19. We want to track your symptoms to make sure you're okay over the next 2 weeks. Please look for an email from [employer]---this is a free, online program that we'll use to keep in touch. To sign up, " follow the link in the email. Learn more at [email or web address]  How can I take care of myself?   Get lots of rest. Drink extra fluids(unless a doctor has told you not to).  Take Tylenol (acetaminophen) for fever or pain. If you have liver or kidney problems, ask your family doctor if it's okay to take Tylenol.   Adults can take either:    650 mg (two 325 mg pills) every 4 to 6 hours, or...  1,000 mg (two 500 mg pills) every 8 hours as needed.   Note: Don't take more than 3,000 mg in one day. Acetaminophen is found in many medicines (both prescribed and over-the-counter medicines). Read all labels to be sure you don't take too much.   For children, check the Tylenol bottle for the right dose. The dose is based on the child's age or weight.    If you have other health problems (like cancer, heart failure, an organ transplant or severe kidney disease):Call your specialty clinic if you don't feel better in the next 2 days.    Know when to call 911. Emergency warning signs include:   Trouble breathing or shortness of breath Pain or pressure in the chest that doesn't go away Feeling confused like you haven't felt before, or not being able to wake up Bluish-colored lips or face.  Where can I get more information?   [facility] -- About COVID-19: [email or web address]  CDC -- What to Do If You're Sick: www.cdc.gov/coronavirus/2019-ncov/about/steps-when-sick.html  CDC -- Ending Home Isolation: www.cdc.gov/coronavirus/2019-ncov/hcp/disposition-in-home-patients.html  CDC -- Caring for Someone: www.cdc.gov/coronavirus/2019-ncov/if-you-are-sick/care-for-someone.html  [facility] -- Interim Guidance for Hospital Discharge to Home: www.health.[facility].[place]/diseases/coronavirus/hcp/hospdischarge.pdf  [facility] clinical trials (COVID-19 research studies): clinicalaffairs.[facility]/umn-clinical-trials  Below are the COVID-19 hotlines at the [place] Department of Health ([facility]). Interpreters are  available.    For health questions: Call 121-080-7034 or 1-439.727.6197 (7 a.m. to 7 p.m.) For questions about schools and childcare: Call 619-837-0769 or 1-711.920.4153 (7 a.m. to 7 p.m.)    Diagnosis: Acute pharyngitis, unspecified  Diagnosis ICD: J02.9

## 2020-07-18 DIAGNOSIS — Z20.822 SUSPECTED COVID-19 VIRUS INFECTION: ICD-10-CM

## 2020-07-18 PROCEDURE — U0003 INFECTIOUS AGENT DETECTION BY NUCLEIC ACID (DNA OR RNA); SEVERE ACUTE RESPIRATORY SYNDROME CORONAVIRUS 2 (SARS-COV-2) (CORONAVIRUS DISEASE [COVID-19]), AMPLIFIED PROBE TECHNIQUE, MAKING USE OF HIGH THROUGHPUT TECHNOLOGIES AS DESCRIBED BY CMS-2020-01-R: HCPCS | Performed by: FAMILY MEDICINE

## 2020-07-18 NOTE — LETTER
July 22, 2020        Emily Fritz  [address] [facility] [facility] [address]    This letter provides a written record that you were tested for COVID-19 on 7/18/20.       Your result was negative. This means that we didn t find the virus that causes COVID-19 in your sample. A test may show negative when you do actually have the virus. This can happen when the virus is in the early stages of infection, before you feel illness symptoms.    If you have symptoms   Stay home and away from others (self-isolate) until you meet ALL of the guidelines below:    You ve had no fever--and no medicine that reduces fever--for 3 full days (72 hours). And      Your other symptoms have gotten better. For example, your cough or breathing has improved. And     At least 10 days have passed since your symptoms started.    During this time:    Stay home. Don t go to work, school or anywhere else.     Stay in your own room, including for meals. Use your own bathroom if you can.    Stay away from others in your home. No hugging, kissing or shaking hands. No visitors.    Clean  high touch  surfaces often (doorknobs, counters, handles, etc.). Use a household cleaning spray or wipes. You can find a full list on the EPA website at www.epa.gov/pesticide-registration/list-n-disinfectants-use-against-sars-cov-2.    Cover your mouth and nose with a mask, tissue or washcloth to avoid spreading germs.    Wash your hands and face often with soap and water.    Going back to work  Check with your employer for any guidelines to follow for going back to work.    Employers: This document serves as formal notice that your employee tested negative for COVID-19, as of the testing date shown above.

## 2020-07-20 LAB
SARS-COV-2 RNA SPEC QL NAA+PROBE: NOT DETECTED
SPECIMEN SOURCE: NORMAL

## 2020-08-24 ENCOUNTER — OFFICE VISIT (OUTPATIENT)
Dept: OPTOMETRY | Facility: CLINIC | Age: 55
End: 2020-08-24
Payer: COMMERCIAL

## 2020-08-24 DIAGNOSIS — H52.03 HYPEROPIA, BILATERAL: Primary | ICD-10-CM

## 2020-08-24 DIAGNOSIS — H52.4 PRESBYOPIA: ICD-10-CM

## 2020-08-24 PROCEDURE — 92310 CONTACT LENS FITTING OU: CPT | Mod: GA | Performed by: OPTOMETRIST

## 2020-08-24 PROCEDURE — 92015 DETERMINE REFRACTIVE STATE: CPT | Performed by: OPTOMETRIST

## 2020-08-24 PROCEDURE — 92014 COMPRE OPH EXAM EST PT 1/>: CPT | Performed by: OPTOMETRIST

## 2020-08-24 ASSESSMENT — REFRACTION_CURRENTRX
OS_BASECURVE: 8.6
OS_SPHERE: +2.25
OD_BASECURVE: 8.6
OD_ADD: HIGH
OS_DIAMETER: 14.1
OD_DIAMETER: 14.1
OS_SPHERE: +2.25
OS_ADD: HIGH
OD_SPHERE: +2.00
OD_SPHERE: +2.50
OD_DIAMETER: 14.1
OS_ADD: HIGH
OD_ADD: LOW
OS_DIAMETER: 14.1
OD_BASECURVE: 8.6
OS_BASECURVE: 8.6

## 2020-08-24 ASSESSMENT — TONOMETRY
OS_IOP_MMHG: 11
OD_IOP_MMHG: 8
IOP_METHOD: APPLANATION

## 2020-08-24 ASSESSMENT — VISUAL ACUITY
OS_CC+: -1
OS_CC: 20/25
OD_CC: 20/25-1
OS_SC: 20/200
OD_CC: 20/20
CORRECTION_TYPE: GLASSES
OS_CC: 20/30-1
OD_SC: 20/200
METHOD: SNELLEN - LINEAR
OD_CC+: -1

## 2020-08-24 ASSESSMENT — REFRACTION_WEARINGRX
OS_CYLINDER: +0.50
OD_ADD: +2.25
SPECS_TYPE: PAL
OD_SPHERE: +2.25
OD_SPHERE: +2.00
OS_AXIS: 055
OD_ADD: +2.00
OS_ADD: +2.00
OD_CYLINDER: SPHERE
OS_SPHERE: +2.00
OS_SPHERE: +2.00
OD_CYLINDER: SPHERE
OS_ADD: +2.25
OS_CYLINDER: SPHERE

## 2020-08-24 ASSESSMENT — REFRACTION_MANIFEST
OS_AXIS: 079
OS_CYLINDER: +0.50
METHOD_AUTOREFRACTION: 1
OS_ADD: +2.00
OS_SPHERE: +2.50
OD_ADD: +2.00
OD_SPHERE: +2.25
OD_SPHERE: +2.75
OS_SPHERE: +2.25
OS_CYLINDER: +0.25
OD_CYLINDER: SPHERE
OD_CYLINDER: SPHERE
OS_AXIS: 055

## 2020-08-24 ASSESSMENT — EXTERNAL EXAM - RIGHT EYE: OD_EXAM: NORMAL

## 2020-08-24 ASSESSMENT — SLIT LAMP EXAM - LIDS
COMMENTS: NORMAL
COMMENTS: NORMAL

## 2020-08-24 ASSESSMENT — CUP TO DISC RATIO
OD_RATIO: 0.15
OS_RATIO: 0.15

## 2020-08-24 ASSESSMENT — CONF VISUAL FIELD
OD_NORMAL: 1
METHOD: COUNTING FINGERS
OS_NORMAL: 1

## 2020-08-24 ASSESSMENT — EXTERNAL EXAM - LEFT EYE: OS_EXAM: NORMAL

## 2020-08-24 NOTE — PATIENT INSTRUCTIONS
Patient was advised of today's exam findings.  Fill glasses prescription  Contact lenses prescription released to patient today.  Test new trials one week, then alright to order supply.  Return for contact lenses check appointment as needed if any vision or comfort concerns  Return in 1 year for eye exam    Meera Roach O.D.  [facility]   [address] [place] [address]  [phone]    To order your contacts online please log on to [email or web address] .  Go to the link which is found on the [facility] Care and Vision Services page.    Another option is to call  [phone] to order your contacts.

## 2020-08-24 NOTE — PROGRESS NOTES
Chief Complaint   Patient presents with     Annual Eye Exam     Contact Lens Evaluation        Previous contact lens wearer? Yes: Crystal 1 day multi  Comfort of contact lenses :Good  Satisfied with current lenses: No Vision not good - reading a menu is  difficult. Distance is ok        Last Eye Exam: 2019  Dilated Previously: Yes    What are you currently using to see?  Glasses  Primarily and contacts. Patient mostly wears glasses.  Glasses she presents with today are an older prescription.    Distance Vision Acuity: with older prescription having trouble    Near Vision Acuity: Not satisfied  - see above. Also problems with glasses.    Eye Comfort: dry  Do you use eye drops? : Yes: Occasionally OTC  2-3 times a week  Occupation or Hobbies: . Does a lot of gardening and quilting.      Toma Chatterjee  [facility]       Medical, surgical and family histories reviewed and updated 8/24/2020.       OBJECTIVE: See Ophthalmology exam    ASSESSMENT:    ICD-10-CM    1. Hyperopia, bilateral  H52.03 REFRACTION     EYE EXAM (SIMPLE-NONBILLABLE)     CONTACT LENS FITTING,BILAT w/ signed waiver   2. Presbyopia  H52.4 REFRACTION     EYE EXAM (SIMPLE-NONBILLABLE)     CONTACT LENS FITTING,BILAT w/ signed waiver      PLAN:     Patient Instructions   Patient was advised of today's exam findings.  Fill glasses prescription  Contact lenses prescription released to patient today.  Test new trials one week, then alright to order supply.  Return for contact lenses check appointment as needed if any vision or comfort concerns  Return in 1 year for eye exam    Meera Roach O.D.  [facility]   [address] [place] [address]  [phone]    To order your contacts online please log on to [email or web address] .  Go to the link which is found on the Eye Care and Vision Services page.    Another option is to call  [phone] to order your contacts.

## 2020-08-24 NOTE — LETTER
8/24/2020         RE: Emily Fritz  [address] [facility] [address]        Dear Colleague,    Thank you for referring your patient, Emily Fritz, to the [facility]. Please see a copy of my visit note below.    Chief Complaint   Patient presents with     Annual Eye Exam     Contact Lens Evaluation        Previous contact lens wearer? Yes: Clariti 1 day multi  Comfort of contact lenses :Good  Satisfied with current lenses: No Vision not good - reading a menu is  difficult. Distance is ok        Last Eye Exam: 2019  Dilated Previously: Yes    What are you currently using to see?  Glasses  Primarily and contacts. Patient mostly wears glasses.  Glasses she presents with today are an older prescription.    Distance Vision Acuity: with older prescription having trouble    Near Vision Acuity: Not satisfied  - see above. Also problems with glasses.    Eye Comfort: dry  Do you use eye drops? : Yes: Occasionally OTC  2-3 times a week  Occupation or Hobbies: . Does a lot of gardening and quilting.      Toma Layquist  [facility]       Medical, surgical and family histories reviewed and updated 8/24/2020.       OBJECTIVE: See Ophthalmology exam    ASSESSMENT:    ICD-10-CM    1. Hyperopia, bilateral  H52.03 REFRACTION     EYE EXAM (SIMPLE-NONBILLABLE)     CONTACT LENS FITTING,BILAT w/ signed waiver   2. Presbyopia  H52.4 REFRACTION     EYE EXAM (SIMPLE-NONBILLABLE)     CONTACT LENS FITTING,BILAT w/ signed waiver      PLAN:     Patient Instructions   Patient was advised of today's exam findings.  Fill glasses prescription  Contact lenses prescription released to patient today.  Test new trials one week, then alright to order supply.  Return for contact lenses check appointment as needed if any vision or comfort concerns  Return in 1 year for eye exam    Meera Roach O.D.  [facility]   [address] [place], [place] [address] [phone]    To order your  contacts online please log on to [email or web address] .  Go to the link which is found on the Eye Care and Vision Services page.    Another option is to call  [phone] to order your contacts.                                     Again, thank you for allowing me to participate in the care of your patient.        Sincerely,        Meera Roach OD

## 2020-09-24 DIAGNOSIS — Z12.31 VISIT FOR SCREENING MAMMOGRAM: ICD-10-CM

## 2020-09-24 PROCEDURE — 77067 SCR MAMMO BI INCL CAD: CPT | Mod: TC

## 2020-09-24 PROCEDURE — 77063 BREAST TOMOSYNTHESIS BI: CPT | Mod: TC

## 2020-10-07 ENCOUNTER — VIRTUAL VISIT (OUTPATIENT)
Dept: FAMILY MEDICINE | Facility: OTHER | Age: 55
End: 2020-10-07
Payer: COMMERCIAL

## 2020-10-07 PROCEDURE — 99421 OL DIG E/M SVC 5-10 MIN: CPT | Performed by: NURSE PRACTITIONER

## 2020-10-07 NOTE — PROGRESS NOTES
"Date: 10/07/2020 11:46:41  Clinician: Stefany Barnett  Clinician NPI: [id number]  Patient: Elsa Fritz  Patient : 1965  Patient Address: [address] [place], [place] [address]  Patient Phone: [phone]  Visit Protocol: URI  Patient Summary:  Elsa is a 55 year old ( : 1965 ) female who initiated a [facility] Visit for COVID-19 (Coronavirus) evaluation and screening. When asked the question \"Please sign me up to receive news, health information and promotions from [facility].\", Elsa responded \"No\".    Elsa states her symptoms started gradually 3-4 days ago. After her symptoms started, they improved and then got worse again.   Her symptoms consist of nasal congestion, rhinitis, facial pain or pressure, myalgia, and malaise.   Symptom details     Nasal secretions: The color of her mucus is clear.    Facial pain or pressure: The facial pain or pressure does not feel worse when bending or leaning forward.      Elsa denies having ear pain, headache, wheezing, fever, enlarged lymph nodes, cough, anosmia, vomiting, nausea, chills, sore throat, teeth pain, ageusia, and diarrhea. She also denies having a sinus infection within the past year, taking antibiotic medication in the past month, and having recent facial or sinus surgery in the past 60 days. She is not experiencing dyspnea.   Precipitating events  She has not recently been exposed to someone with influenza. Elsa has not been in close contact with any high risk individuals.   Pertinent COVID-19 (Coronavirus) information  In the past 14 days, Elsa has not worked in a congregate living setting.   She does not work or volunteer as healthcare worker or a  and does not work or volunteer in a healthcare facility.   Elsa also has not lived in a congregate living setting in the past 14 days. She does not live with a healthcare worker.   Elsa has not had a close contact with a laboratory-confirmed COVID-19 patient within 14 days of symptom onset.   Since " December 2019, Elsa and has not had upper respiratory infection or influenza-like illness. Has not been diagnosed with lab-confirmed COVID-19 test   Pertinent medical history  Elsa typically gets a yeast infection when she takes antibiotics. She has used fluconazole (Diflucan) to treat previous yeast infections. 1 dose of fluconazole (Diflucan) has typically been sufficient for symptoms to resolve in the past.   Elsa does not need a return to work/school note.   Weight: 150 lbs   Elsa does not smoke or use smokeless tobacco.   Weight: 150 lbs    MEDICATIONS: No current medications, ALLERGIES: NKDA  Clinician Response:  Dear Elsa,   Your symptoms show that you may have coronavirus (COVID-19). This illness can cause fever, cough and trouble breathing. Many people get a mild case and get better on their own. Some people can get very sick.  What should I do?  We would like to test you for this virus.   1. Please call [phone] to schedule your visit. Explain that you were referred by [facility] to have a COVID-19 test. Be ready to share your [facility] visit ID number.  The following will serve as your written order for this COVID Test, ordered by me, for the indication of suspected COVID [Z20.828]: The test will be ordered in [employer], our electronic health record, after you are scheduled. It will show as ordered and authorized by Jose Luis Kwong MD.  Order: COVID-19 (Coronavirus) PCR for SYMPTOMATIC testing from [facility].      2. When it's time for your COVID test:  Stay at least 6 feet away from others. (If someone will drive you to your test, stay in the backseat, as far away from the  as you can.)   Cover your mouth and nose with a mask, tissue or washcloth.  Go straight to the testing site. Don't make any stops on the way there or back.      3.Starting now: Stay home and away from others (self-isolate) until:   You've had no fever---and no medicine that reduces fever---for one full day (24 hours). And...   Your other symptoms  "have gotten better. For example, your cough or breathing has improved. And...   At least 10 days have passed since your symptoms started.       During this time, don't leave the house except for testing or medical care.   Stay in your own room, even for meals. Use your own bathroom if you can.   Stay away from others in your home. No hugging, kissing or shaking hands. No visitors.  Don't go to work, school or anywhere else.    Clean \"high touch\" surfaces often (doorknobs, counters, handles, etc.). Use a household cleaning spray or wipes. You'll find a full list of  on the EPA website: www.epa.gov/pesticide-registration/list-n-disinfectants-use-against-sars-cov-2.   Cover your mouth and nose with a mask, tissue or washcloth to avoid spreading germs.  Wash your hands and face often. Use soap and water.  Caregivers in these groups are at risk for severe illness due to COVID-19:  o People 65 years and older  o People who live in a nursing home or long-term care facility  o People with chronic disease (lung, heart, cancer, diabetes, kidney, liver, immunologic)  o People who have a weakened immune system, including those who:   Are in cancer treatment  Take medicine that weakens the immune system, such as corticosteroids  Had a bone marrow or organ transplant  Have an immune deficiency  Have poorly controlled HIV or AIDS  Are obese (body mass index of 40 or higher)  Smoke regularly   o Caregivers should wear gloves while washing dishes, handling laundry and cleaning bedrooms and bathrooms.  o Use caution when washing and drying laundry: Don't shake dirty laundry, and use the warmest water setting that you can.  o For more tips, go to www.cdc.gov/coronavirus/2019-ncov/downloads/10Things.pdf.    4.Sign up for [employer]. We know it's scary to hear that you might have COVID-19. We want to track your symptoms to make sure you're okay over the next 2 weeks. Please look for an email from [employer]---this is a free, " online program that we'll use to keep in touch. To sign up, follow the link in the email. Learn more at [email or web address]  How can I take care of myself?   Get lots of rest. Drink extra fluids (unless a doctor has told you not to).   Take Tylenol (acetaminophen) for fever or pain. If you have liver or kidney problems, ask your family doctor if it's okay to take Tylenol.   Adults can take either:    650 mg (two 325 mg pills) every 4 to 6 hours, or...   1,000 mg (two 500 mg pills) every 8 hours as needed.    Note: Don't take more than 3,000 mg in one day. Acetaminophen is found in many medicines (both prescribed and over-the-counter medicines). Read all labels to be sure you don't take too much.   For children, check the Tylenol bottle for the right dose. The dose is based on the child's age or weight.    If you have other health problems (like cancer, heart failure, an organ transplant or severe kidney disease): Call your specialty clinic if you don't feel better in the next 2 days.       Know when to call 911. Emergency warning signs include:    Trouble breathing or shortness of breath Pain or pressure in the chest that doesn't go away Feeling confused like you haven't felt before, or not being able to wake up Bluish-colored lips or face.  Where can I get more information?   [facility] -- About COVID-19: [email or web address]   CDC -- What to Do If You're Sick: www.cdc.gov/coronavirus/2019-ncov/about/steps-when-sick.html   CDC -- Ending Home Isolation: www.cdc.gov/coronavirus/2019-ncov/hcp/disposition-in-home-patients.html   CDC -- Caring for Someone: www.cdc.gov/coronavirus/2019-ncov/if-you-are-sick/care-for-someone.html   [facility] -- Interim Guidance for Hospital Discharge to Home: www.health.[facility].[place]/diseases/coronavirus/hcp/hospdischarge.pdf   [facility] clinical trials (COVID-19 research studies): clinicalaffairs.[facility]-clinical-trials    Below are the COVID-19  hotlines at the [place] Department of Health ([facility]). Interpreters are available.    For health questions: Call [phone] or 1-409.179.6268 (7 a.m. to 7 p.m.) For questions about schools and childcare: Call [phone] or 1-157.535.3319 (7 a.m. to 7 p.m.)    Diagnosis: Cough  Diagnosis ICD: R05

## 2020-10-09 DIAGNOSIS — Z20.822 SUSPECTED COVID-19 VIRUS INFECTION: Primary | ICD-10-CM

## 2020-11-02 ENCOUNTER — VIRTUAL VISIT (OUTPATIENT)
Dept: FAMILY MEDICINE | Facility: CLINIC | Age: 55
End: 2020-11-02
Payer: COMMERCIAL

## 2020-11-02 DIAGNOSIS — G43.109 MIGRAINE WITH AURA AND WITHOUT STATUS MIGRAINOSUS, NOT INTRACTABLE: ICD-10-CM

## 2020-11-02 PROCEDURE — 99213 OFFICE O/P EST LOW 20 MIN: CPT | Mod: 95 | Performed by: PHYSICIAN ASSISTANT

## 2020-11-02 RX ORDER — RIZATRIPTAN BENZOATE 10 MG/1
10 TABLET ORAL
Qty: 18 TABLET | Refills: 3 | Status: SHIPPED | OUTPATIENT
Start: 2020-11-02 | End: 2022-04-29

## 2020-11-02 NOTE — PROGRESS NOTES
"Emily Fritz is a 55 year old female who is being evaluated via a billable video visit.      The patient has been notified of following:     \"This video visit will be conducted via a call between you and your physician/provider. We have found that certain health care needs can be provided without the need for an in-person physical exam.  This service lets us provide the care you need with a video conversation.  If a prescription is necessary we can send it directly to your pharmacy.  If lab work is needed we can place an order for that and you can then stop by our lab to have the test done at a later time.    Video visits are billed at different rates depending on your insurance coverage.  Please reach out to your insurance provider with any questions.    If during the course of the call the physician/provider feels a video visit is not appropriate, you will not be charged for this service.\"    Patient has given verbal consent for Video visit? Yes  How would you like to obtain your AVS?   If you are dropped from the video visit, the video invite should be resent to: Send to e-mail at: [email or web address] BACKUP # [phone]  Will anyone else be joining your video visit? No    Subjective     Emily Fritz is a 55 year old female who presents today via video visit for the following health issues:    HPI     Migraine     Since your last clinic visit, how have your headaches changed?  No change    How often are you getting headaches or migraines? Every 8 weeks     Are you able to do normal daily activities when you have a migraine? Yes    Are you taking rescue/relief medications? (Select all that apply) Maxalt    How helpful is your rescue/relief medication?  I get total relief    Are you taking any medications to prevent migraines? (Select all that apply)  Other: Maxalt    In the past 4 weeks, how often have you gone to urgent care or the emergency room because of your headaches?  0      How many days " per week do you miss taking your medication? PRN         Video Start Time: 10:49 AM        Review of Systems   Constitutional, HEENT, cardiovascular, pulmonary, GI, , musculoskeletal, neuro, skin, endocrine and psych systems are negative, except as otherwise noted.      Objective           Vitals:  No vitals were obtained today due to virtual visit.    Physical Exam     VIDEO NOT WORKING / APPOINTMENT CONDUCTED THROUGH AUDIO              Assessment & Plan     Migraine with aura and without status migrainosus, not intractable  Refill of her medication given.   She is not having more migraines and they are will managed with the abortive therapy.   She is working from home also also going into work if needed during the pandemic.   Plan to return to be seen in the clinic for her routine cares in 6 months.   - rizatriptan (MAXALT) 10 MG tablet; Take 1 tablet (10 mg) by mouth at onset of headache for migraine (appointment needed for refills) May repeat in 2 hours. Max 3 tablets/24 hours.          No follow-ups on file.    Kristen M. Kehr, PA-C  [facility]      Video-Visit Details    Type of service:  Video Visit    Video End Time:10:59 AM    Originating Location (pt. Location): Home    Distant Location (provider location):  [facility] [place]     Platform used for Video Visit: [employer]

## 2021-01-14 ENCOUNTER — HEALTH MAINTENANCE LETTER (OUTPATIENT)
Age: 56
End: 2021-01-14

## 2021-02-21 ENCOUNTER — MYC MEDICAL ADVICE (OUTPATIENT)
Dept: FAMILY MEDICINE | Facility: CLINIC | Age: 56
End: 2021-02-21

## 2021-02-21 DIAGNOSIS — Z83.719 FAMILY HISTORY OF COLONIC POLYPS: Primary | ICD-10-CM

## 2021-02-21 DIAGNOSIS — Z12.11 SPECIAL SCREENING FOR MALIGNANT NEOPLASMS, COLON: ICD-10-CM

## 2021-02-22 NOTE — TELEPHONE ENCOUNTER
Last Colonoscopy 4/16/15  Referral pended.     Health Maintenance Due   Topic Date Due     ADVANCE CARE PLANNING  1965     HIV SCREENING  06/05/1980     COLORECTAL CANCER SCREENING  04/16/2020     PREVENTIVE CARE VISIT  04/24/2020     INFLUENZA VACCINE (1) 09/01/2020     HPV TEST  11/22/2020     PAP  11/22/2020     PHQ-2  01/01/2021     Toyin Ospina RN

## 2021-02-23 NOTE — TELEPHONE ENCOUNTER
Raj Pierce  You 17 hours ago (2:40 PM)        Feeling good  the pain went away Saturday  other then that how are my labs if good will have the again in 12 weeks thanks       Creatinine = 3.66 (2/19/21)  Baseline 2.9 - 3.2      PLAN:  Send GameTube regarding:  Having cough/cold/congestion?   Nausea/Vomiting?  Diarrhea?   Dehydration?   Missed medication?  Changes in medication, (german diuretics)?  If not on fluid restriction, instruct to improve hydration and recheck BMP next week.    OUTCOME:  GameTube message sent.  Lab orders sent to:  [facility]        [phone] (Phone)        [phone] (Fax)        Copy of lab order also sent via GameTube     To provider to review. Should she make an appointment for evaluation.  Pamela DONATON, RN

## 2021-03-14 ENCOUNTER — HEALTH MAINTENANCE LETTER (OUTPATIENT)
Age: 56
End: 2021-03-14

## 2021-03-29 NOTE — PROGRESS NOTES
SUBJECTIVE:   CC: Emily Fritz is an 55 year old woman who presents for preventive health visit.     Healthy Habits:     Getting at least 3 servings of Calcium per day:  Yes    Bi-annual eye exam:  Yes    Dental care twice a year:  Yes    Sleep apnea or symptoms of sleep apnea:  None    Diet:  Regular (no restrictions)    Frequency of exercise:  2-3 days/week    Duration of exercise:  15-30 minutes    Taking medications regularly:  Yes    Medication side effects:  Not applicable    PHQ-2 Total Score: 1    Additional concerns today:  No    Patient had colonoscopy ordered in February, has not head from scheduling yet.  Also, history of iron excess, has seen Hematology, but has not had labs since 2019. Requesting labs today.    Today's PHQ-2 Score:   PHQ-2 (  [employer]) 3/21/2021   Q1: Little interest or pleasure in doing things 0   Q2: Feeling down, depressed or hopeless 1   PHQ-2 Score 1   Q1: Little interest or pleasure in doing things Not at all   Q2: Feeling down, depressed or hopeless Several days   PHQ-2 Score 1       Abuse: Current or Past (Physical, Sexual or Emotional) - No  Do you feel safe in your environment? Yes        Social History     Tobacco Use     Smoking status: Former Smoker     Packs/day: 0.00     Years: 10.00     Pack years: 0.00     Quit date: 1995     Years since quittin.2     Smokeless tobacco: Never Used     Tobacco comment: lives in smoke free household   Substance Use Topics     Alcohol use: Yes         Alcohol Use 3/21/2021   Prescreen: >3 drinks/day or >7 drinks/week? No   Prescreen: >3 drinks/day or >7 drinks/week? -   No flowsheet data found.    Reviewed orders with patient.  Reviewed health maintenance and updated orders accordingly - Yes  Patient Active Problem List   Diagnosis     CARDIOVASCULAR SCREENING; LDL GOAL LESS THAN 160     Cholelithiasis     Diagnostic skin and sensitization tests     Allergic rhinitis due to animal dander     Rhinitis, allergic to other  allergen     House dust mite allergy     Lateral epicondylitis     Vitamin D deficiency disease     Heart palpitations     Family history of colonic polyps     Family history of breast cancer in mother     Gastroesophageal reflux disease without esophagitis     Menorrhagia with regular cycle     Dry eyes     Migraine with aura and without status migrainosus, not intractable     Past Surgical History:   Procedure Laterality Date     C/SECTION, LOW TRANSVERSE  1988     CHOLECYSTECTOMY       DILATION AND CURETTAGE, HYSTEROSCOPY DIAGNOSTIC, COMBINED  16    Menometrorrhagia       Social History     Tobacco Use     Smoking status: Former Smoker     Packs/day: 0.00     Years: 10.00     Pack years: 0.00     Quit date: 1995     Years since quittin.2     Smokeless tobacco: Never Used     Tobacco comment: lives in smoke free household   Substance Use Topics     Alcohol use: Yes     Family History   Problem Relation Age of Onset     Diabetes Mother 78     Hypertension Mother      Thyroid Disease Mother      Cancer Mother 78        breast-mastectomy     Circulatory Mother         DVT     Breast Cancer Mother 79     Hypertension Father      Cerebrovascular Disease Father      Heart Disease Father         CHF     Hypertension Sister      Other Cancer Sister         Carcinoid Cancer     Breast Cancer Maternal Aunt         postmenopausal     Macular Degeneration Paternal Aunt      Breast Cancer Maternal Aunt         postmenopausal     Gastrointestinal Disease Sister         gastric carcinoid tumors with mets to liver     Depression Brother         Suicide 2020     Glaucoma No family hx of            Breast Cancer Screening:  Any new diagnosis of family breast, ovarian, or bowel cancer? No  Mammogram Screening: Recommended mammography every 1-2 years with patient discussion and risk factor consideration  Pertinent mammograms are reviewed under the imaging tab.    History of abnormal Pap smear: NO - age 30-65 PAP  every 5 years with negative HPV co-testing recommended  PAP / HPV Latest Ref Rng & Units 11/22/2017 12/10/2014 4/12/2011   PAP - NIL NIL NIL   HPV 16 DNA NEG:Negative Negative - -   HPV 18 DNA NEG:Negative Negative - -   OTHER HR HPV NEG:Negative Negative - -     Reviewed and updated as needed this visit by clinical staff  Tobacco  Allergies  Meds   Med Hx  Surg Hx  Fam Hx  Soc Hx        Reviewed and updated as needed this visit by Provider                Past Medical History:   Diagnosis Date     Allergic rhinitis due to animal dander      Chronic constipation      Diagnostic skin and sensitization tests 2/13/12 skin tests pos. for: dog(+)/DM/CR     Family history of breast cancer in mother 12/10/2014     Family history of breast cancer in mother      Family history of colonic polyps 12/10/2014     Family history of colonic polyps 12/10/2014     Family history of colonic polyps      Heart palpitations 3/2009    Holter     House dust mite allergy      Lateral epicondylitis 3/28/2012     Rhinitis, allergic to other allergen       Past Surgical History:   Procedure Laterality Date     C/SECTION, LOW TRANSVERSE  1988     CHOLECYSTECTOMY  2010     DILATION AND CURETTAGE, HYSTEROSCOPY DIAGNOSTIC, COMBINED  8/25/16    Menometrorrhagia       Review of Systems  CONSTITUTIONAL: NEGATIVE for fever, chills, change in weight  INTEGUMENTARY/SKIN: NEGATIVE for worrisome rashes, moles or lesions  EYES: NEGATIVE for vision changes or irritation  ENT: NEGATIVE for ear, mouth and throat problems  RESP: NEGATIVE for significant cough or SOB  BREAST: NEGATIVE for masses, tenderness or discharge  CV: NEGATIVE for chest pain, palpitations or peripheral edema  GI: NEGATIVE for nausea, abdominal pain, heartburn, or change in bowel habits  : NEGATIVE for unusual urinary or vaginal symptoms. No vaginal bleeding.  MUSCULOSKELETAL: NEGATIVE for significant arthralgias or myalgia  NEURO: NEGATIVE for weakness, dizziness or  "paresthesias  PSYCHIATRIC: NEGATIVE for changes in mood or affect      OBJECTIVE:   BP (!) 140/91 (BP Location: Right arm, Patient Position: Sitting, Cuff Size: Adult Regular)   Pulse 83   Temp 97.5  F (36.4  C) (Tympanic)   Ht 1.797 m (5' 10.75\")   Wt 69.3 kg (152 lb 12.8 oz)   LMP  (LMP Unknown)   SpO2 100%   BMI 21.46 kg/m    Physical Exam  GENERAL APPEARANCE: healthy, alert and no distress  EYES: Eyes grossly normal to inspection, PERRL and conjunctivae and sclerae normal  HENT: ear canals and TM's normal  NECK: no adenopathy, no asymmetry, masses, or scars and thyroid normal to palpation  RESP: lungs clear to auscultation - no rales, rhonchi or wheezes  BREAST: normal without masses, tenderness or nipple discharge and no palpable axillary masses or adenopathy  CV: regular rate and rhythm, normal S1 S2, no S3 or S4, no murmur, click or rub, no peripheral edema and peripheral pulses strong  ABDOMEN: soft, nontender, no hepatosplenomegaly, no masses and bowel sounds normal   (female): normal female external genitalia, normal urethral meatus, vaginal mucosal atrophy noted, normal cervix, adnexae, and uterus without masses or abnormal discharge  MS: no musculoskeletal defects are noted and gait is age appropriate without ataxia  SKIN: no suspicious lesions or rashes  NEURO: Normal strength and tone, sensory exam grossly normal, mentation intact and speech normal  PSYCH: mentation appears normal and affect normal/bright    ASSESSMENT/PLAN:   1. Encounter for gynecological examination without abnormal finding  Health maintenance reviewed. Patient provided with number for specialty scheduling for colonoscopy.     2. Iron excess  Check labs today, aware abnormal labs will need follow up with primary care or Hematology.   - Ferritin  - Iron and iron binding capacity    COUNSELING:  Reviewed preventive health counseling, as reflected in patient instructions  Special attention given to:        Regular exercise     " "  Healthy diet/nutrition       Colon cancer screening       HIV screeninx in teen years, 1x in adult years, and at intervals if high risk    Estimated body mass index is 21.46 kg/m  as calculated from the following:    Height as of this encounter: 1.797 m (5' 10.75\").    Weight as of this encounter: 69.3 kg (152 lb 12.8 oz).        She reports that she quit smoking about 26 years ago. She smoked 0.00 packs per day for 10.00 years. She has never used smokeless tobacco.      Counseling Resources:  ATP IV Guidelines  Pooled Cohorts Equation Calculator  Breast Cancer Risk Calculator  BRCA-Related Cancer Risk Assessment: FHS-7 Tool  FRAX Risk Assessment  ICSI Preventive Guidelines  Dietary Guidelines for Americans, 2010  USDA's MyPlate  ASA Prophylaxis  Lung CA Screening    LEANDRO Cook CNP  [facility]  "

## 2021-03-30 ENCOUNTER — OFFICE VISIT (OUTPATIENT)
Dept: OBGYN | Facility: CLINIC | Age: 56
End: 2021-03-30
Payer: COMMERCIAL

## 2021-03-30 VITALS
TEMPERATURE: 97.5 F | SYSTOLIC BLOOD PRESSURE: 140 MMHG | HEIGHT: 71 IN | WEIGHT: 152.8 LBS | BODY MASS INDEX: 21.39 KG/M2 | OXYGEN SATURATION: 100 % | HEART RATE: 83 BPM | DIASTOLIC BLOOD PRESSURE: 91 MMHG

## 2021-03-30 DIAGNOSIS — Z01.419 ENCOUNTER FOR GYNECOLOGICAL EXAMINATION WITHOUT ABNORMAL FINDING: Primary | ICD-10-CM

## 2021-03-30 DIAGNOSIS — E83.19 IRON EXCESS: ICD-10-CM

## 2021-03-30 LAB
FERRITIN SERPL-MCNC: 170 NG/ML (ref 8–252)
IRON SATN MFR SERPL: 66 % (ref 15–46)
IRON SERPL-MCNC: 134 UG/DL (ref 35–180)
TIBC SERPL-MCNC: 202 UG/DL (ref 240–430)

## 2021-03-30 PROCEDURE — 36415 COLL VENOUS BLD VENIPUNCTURE: CPT | Performed by: NURSE PRACTITIONER

## 2021-03-30 PROCEDURE — 83550 IRON BINDING TEST: CPT | Performed by: NURSE PRACTITIONER

## 2021-03-30 PROCEDURE — 82728 ASSAY OF FERRITIN: CPT | Performed by: NURSE PRACTITIONER

## 2021-03-30 PROCEDURE — 83540 ASSAY OF IRON: CPT | Performed by: NURSE PRACTITIONER

## 2021-03-30 PROCEDURE — 99386 PREV VISIT NEW AGE 40-64: CPT | Performed by: NURSE PRACTITIONER

## 2021-03-30 ASSESSMENT — PAIN SCALES - GENERAL: PAINLEVEL: NO PAIN (0)

## 2021-03-30 ASSESSMENT — MIFFLIN-ST. JEOR: SCORE: 1380.26

## 2021-04-14 ENCOUNTER — MYC MEDICAL ADVICE (OUTPATIENT)
Dept: FAMILY MEDICINE | Facility: CLINIC | Age: 56
End: 2021-04-14

## 2021-05-27 ENCOUNTER — E-VISIT (OUTPATIENT)
Dept: FAMILY MEDICINE | Facility: CLINIC | Age: 56
End: 2021-05-27
Payer: COMMERCIAL

## 2021-05-27 DIAGNOSIS — R30.0 DYSURIA: Primary | ICD-10-CM

## 2021-05-27 PROCEDURE — 99422 OL DIG E/M SVC 11-20 MIN: CPT | Performed by: PHYSICIAN ASSISTANT

## 2021-05-28 DIAGNOSIS — R30.0 DYSURIA: ICD-10-CM

## 2021-05-28 LAB
ALBUMIN UR-MCNC: NEGATIVE MG/DL
APPEARANCE UR: CLEAR
BILIRUB UR QL STRIP: NEGATIVE
COLOR UR AUTO: YELLOW
GLUCOSE UR STRIP-MCNC: NEGATIVE MG/DL
HGB UR QL STRIP: NEGATIVE
KETONES UR STRIP-MCNC: NEGATIVE MG/DL
LEUKOCYTE ESTERASE UR QL STRIP: NEGATIVE
NITRATE UR QL: NEGATIVE
PH UR STRIP: 6.5 PH (ref 5–7)
SOURCE: NORMAL
SP GR UR STRIP: 1.01 (ref 1–1.03)
UROBILINOGEN UR STRIP-ACNC: 0.2 EU/DL (ref 0.2–1)

## 2021-05-28 PROCEDURE — 81003 URINALYSIS AUTO W/O SCOPE: CPT | Performed by: PHYSICIAN ASSISTANT

## 2021-06-09 ENCOUNTER — TELEPHONE (OUTPATIENT)
Dept: GASTROENTEROLOGY | Facility: OUTPATIENT CENTER | Age: 56
End: 2021-06-09

## 2021-06-09 NOTE — TELEPHONE ENCOUNTER
"Screening Questions  1. What insurance is in the chart? PREF ON    2.  Ordering/Referring Provider:     3. BMI : 5'10\"/150=21.5    4. Are you on daily home oxygen? N    5. Do you have a history of difficult airway? N    6. Have you had a heart, lung, or liver transplant? N    7. Are you currently on dialysis? N    8. Have you had a stroke or Transient ischemic atttack (TIA) within 6 months? N    9. In the past 6 months, have you had any heart related issues including cardiomyopathy or heart attack?         If yes, did it require cardiac stenting or other implantable device?N    10. Do you have any implantable devices in your body (pacemaker, defib, LVAD)? N    11. Do you take nitroglycerin? If yes, how often? N    12. Are you currently taking any blood thinners?N    13. Are you a diabetic? N    14. (Females) Are you currently pregnant? N  If yes, how many weeks?    15. Have you had a procedure in the past that was difficult to tolerate with conscious sedation? Any allergies to Fentanyl or Versed N    16. Are you taking any scheduled prescription narcotics more than once daily? N    17. Do you have any chemical dependencies such as alcohol, street drugs, or methadone? N    18. Do you have any history of post-traumatic stress syndrome or mental health issues? N    19. Do you transfer independently? Y    20.  Do you have any issues with constipation? Y    21. Preferred Pharmacy for Pre Prescription : [facility]    Scheduling Details    Procedure Scheduled: COLON  Provider/Surgeon: ARLENE  Date of Procedure: 7/27  [place] [facility] [place]  Caller (Please ask for phone number if not scheduled by patient): PATIENT      Sedation Type: CS  Conscious Sedation- Needs  for 6 hours after the procedure  MAC/General-Needs  for 24 hours after procedure    Pre-op Required at [facility] and OR for  MAC sedation:   (if yes advise patient they will need a pre-op prior to procedure)      Is patient on blood thinners? -N (If " yes- inform patient to follow up with PCP or provider for follow up instructions)     Informed patient they will need an adult  Y  Cannot take any type of public or medical transportation alone    Informed Patient of COVID Test Requirement Y-SCHED    Confirmed Nurse will call to complete assessment Y    Additional comments:

## 2021-06-10 DIAGNOSIS — Z11.59 ENCOUNTER FOR SCREENING FOR OTHER VIRAL DISEASES: ICD-10-CM

## 2021-07-07 ENCOUNTER — E-VISIT (OUTPATIENT)
Dept: OBGYN | Facility: CLINIC | Age: 56
End: 2021-07-07
Payer: COMMERCIAL

## 2021-07-07 DIAGNOSIS — Z53.9 ERRONEOUS ENCOUNTER--DISREGARD: Primary | ICD-10-CM

## 2021-07-08 NOTE — TELEPHONE ENCOUNTER
Notes say it is located on her left side and difficult to tell from picture-but does not appear to be vaginal area. So for a skin rash-needs to send request to primary care provider. I did not charge for E-visit. Violet REEVES CNP

## 2021-07-12 ENCOUNTER — E-VISIT (OUTPATIENT)
Dept: FAMILY MEDICINE | Facility: CLINIC | Age: 56
End: 2021-07-12
Payer: COMMERCIAL

## 2021-07-12 DIAGNOSIS — Z53.9 ERRONEOUS ENCOUNTER--DISREGARD: Primary | ICD-10-CM

## 2021-07-15 ENCOUNTER — TELEPHONE (OUTPATIENT)
Dept: GASTROENTEROLOGY | Facility: CLINIC | Age: 56
End: 2021-07-15

## 2021-07-15 NOTE — TELEPHONE ENCOUNTER
Instructions, policy, conscious sedation plan reviewed. Advised patient to have someone stay with them for 6 hours post exam. Covid test 7-23 [facility]

## 2021-07-20 ENCOUNTER — OFFICE VISIT (OUTPATIENT)
Dept: FAMILY MEDICINE | Facility: CLINIC | Age: 56
End: 2021-07-20
Payer: COMMERCIAL

## 2021-07-20 VITALS
DIASTOLIC BLOOD PRESSURE: 80 MMHG | BODY MASS INDEX: 21.21 KG/M2 | WEIGHT: 151 LBS | SYSTOLIC BLOOD PRESSURE: 134 MMHG | OXYGEN SATURATION: 99 % | TEMPERATURE: 97.3 F | HEART RATE: 83 BPM

## 2021-07-20 DIAGNOSIS — B35.4 TINEA CORPORIS: Primary | ICD-10-CM

## 2021-07-20 PROCEDURE — 99213 OFFICE O/P EST LOW 20 MIN: CPT | Performed by: PHYSICIAN ASSISTANT

## 2021-07-20 NOTE — NURSING NOTE
"Chief Complaint   Patient presents with     Derm Problem       Initial /80   Pulse 83   Temp 97.3  F (36.3  C) (Tympanic)   Wt 68.5 kg (151 lb)   LMP  (LMP Unknown)   SpO2 99%   BMI 21.21 kg/m   Estimated body mass index is 21.21 kg/m  as calculated from the following:    Height as of 3/30/21: 1.797 m (5' 10.75\").    Weight as of this encounter: 68.5 kg (151 lb).  Medication Reconciliation: complete    SOFIA Diaz MA    "

## 2021-07-20 NOTE — PROGRESS NOTES
Assessment & Plan     Tinea corporis  She will plan to get lotrimin cream over the counter an apply 2-3 times daily. If she is not seeing any improvement in 2-3 weeks, then she will send a Mychart message and discuss referral to Dermatology.                    Return in about 3 weeks (around 8/10/2021) for as needed if symptoms worsen or persist, MyChart message.    Kristen M. Kehr, PA-C [facility] MUNIRA Hunter is a 56 year old who presents for the following health issues     HPI     Derm concern red blotches on left side back for a month.   No itching, they are the same size, but there are 3-4 circular areas.     Review of Systems   Constitutional, HEENT, cardiovascular, pulmonary, GI, , musculoskeletal, neuro, skin, endocrine and psych systems are negative, except as otherwise noted.      Objective    /80   Pulse 83   Temp 97.3  F (36.3  C) (Tympanic)   Wt 68.5 kg (151 lb)   LMP  (LMP Unknown)   SpO2 99%   BMI 21.21 kg/m    Body mass index is 21.21 kg/m .  Physical Exam   GENERAL: healthy, alert and no distress  SKIN: 3 circular macular red lesions on her left abdomen. One has a small area of flaking in the center. There is also one in the left groin area with more of a circular ring border.

## 2021-07-20 NOTE — PATIENT INSTRUCTIONS
Lotrimin cream over the counter. Apply 2-3 times / day.     If not resolving in 2-3 weeks, then schedule an appointment with Dermatology

## 2021-07-21 ENCOUNTER — TELEPHONE (OUTPATIENT)
Dept: GASTROENTEROLOGY | Facility: CLINIC | Age: 56
End: 2021-07-21

## 2021-07-21 NOTE — TELEPHONE ENCOUNTER
Caller: Emily Fritz/Self     Procedure: Colonoscopy     Date of Procedure Cancelled: 07/27/2021    Ordering Provider: Kehr, Kristen M, PA-C    Reason for cancel: MD/Denny     Rescheduled: Yes      If rescheduled     Date: 07/27/2021 olga/IRENE Brenner    [facility] [place] [facility]    Note any change or update to original order/sedation:    --Switch from CS to MAC per pt request

## 2021-07-23 ENCOUNTER — LAB (OUTPATIENT)
Dept: LAB | Facility: CLINIC | Age: 56
End: 2021-07-23
Payer: COMMERCIAL

## 2021-07-23 DIAGNOSIS — Z11.59 ENCOUNTER FOR SCREENING FOR OTHER VIRAL DISEASES: ICD-10-CM

## 2021-07-23 PROCEDURE — U0005 INFEC AGEN DETEC AMPLI PROBE: HCPCS

## 2021-07-23 PROCEDURE — U0003 INFECTIOUS AGENT DETECTION BY NUCLEIC ACID (DNA OR RNA); SEVERE ACUTE RESPIRATORY SYNDROME CORONAVIRUS 2 (SARS-COV-2) (CORONAVIRUS DISEASE [COVID-19]), AMPLIFIED PROBE TECHNIQUE, MAKING USE OF HIGH THROUGHPUT TECHNOLOGIES AS DESCRIBED BY CMS-2020-01-R: HCPCS

## 2021-07-24 LAB — SARS-COV-2 RNA RESP QL NAA+PROBE: NEGATIVE

## 2021-07-26 ENCOUNTER — ANESTHESIA EVENT (OUTPATIENT)
Dept: SURGERY | Facility: AMBULATORY SURGERY CENTER | Age: 56
End: 2021-07-26
Payer: COMMERCIAL

## 2021-07-27 ENCOUNTER — MYC MEDICAL ADVICE (OUTPATIENT)
Dept: FAMILY MEDICINE | Facility: CLINIC | Age: 56
End: 2021-07-27

## 2021-07-27 ENCOUNTER — ANESTHESIA (OUTPATIENT)
Dept: SURGERY | Facility: AMBULATORY SURGERY CENTER | Age: 56
End: 2021-07-27
Payer: COMMERCIAL

## 2021-07-27 ENCOUNTER — HOSPITAL ENCOUNTER (OUTPATIENT)
Facility: AMBULATORY SURGERY CENTER | Age: 56
End: 2021-07-27
Attending: INTERNAL MEDICINE
Payer: COMMERCIAL

## 2021-07-27 VITALS
TEMPERATURE: 97 F | HEIGHT: 70 IN | BODY MASS INDEX: 21.47 KG/M2 | OXYGEN SATURATION: 97 % | RESPIRATION RATE: 18 BRPM | HEART RATE: 71 BPM | SYSTOLIC BLOOD PRESSURE: 125 MMHG | WEIGHT: 150 LBS | DIASTOLIC BLOOD PRESSURE: 69 MMHG

## 2021-07-27 VITALS — HEART RATE: 72 BPM

## 2021-07-27 DIAGNOSIS — R21 RASH: Primary | ICD-10-CM

## 2021-07-27 LAB — COLONOSCOPY: NORMAL

## 2021-07-27 PROCEDURE — 45380 COLONOSCOPY AND BIOPSY: CPT | Mod: 33

## 2021-07-27 PROCEDURE — 88305 TISSUE EXAM BY PATHOLOGIST: CPT | Performed by: PATHOLOGY

## 2021-07-27 RX ORDER — SODIUM CHLORIDE, SODIUM LACTATE, POTASSIUM CHLORIDE, CALCIUM CHLORIDE 600; 310; 30; 20 MG/100ML; MG/100ML; MG/100ML; MG/100ML
INJECTION, SOLUTION INTRAVENOUS CONTINUOUS
Status: DISCONTINUED | OUTPATIENT
Start: 2021-07-27 | End: 2021-07-28 | Stop reason: HOSPADM

## 2021-07-27 RX ORDER — PROPOFOL 10 MG/ML
INJECTION, EMULSION INTRAVENOUS PRN
Status: DISCONTINUED | OUTPATIENT
Start: 2021-07-27 | End: 2021-07-27

## 2021-07-27 RX ORDER — LIDOCAINE HYDROCHLORIDE 20 MG/ML
INJECTION, SOLUTION INFILTRATION; PERINEURAL PRN
Status: DISCONTINUED | OUTPATIENT
Start: 2021-07-27 | End: 2021-07-27

## 2021-07-27 RX ORDER — MEPERIDINE HYDROCHLORIDE 25 MG/ML
12.5 INJECTION INTRAMUSCULAR; INTRAVENOUS; SUBCUTANEOUS
Status: DISCONTINUED | OUTPATIENT
Start: 2021-07-27 | End: 2021-07-28 | Stop reason: HOSPADM

## 2021-07-27 RX ORDER — PROPOFOL 10 MG/ML
INJECTION, EMULSION INTRAVENOUS CONTINUOUS PRN
Status: DISCONTINUED | OUTPATIENT
Start: 2021-07-27 | End: 2021-07-27

## 2021-07-27 RX ORDER — LIDOCAINE 40 MG/G
CREAM TOPICAL
Status: DISCONTINUED | OUTPATIENT
Start: 2021-07-27 | End: 2021-07-28 | Stop reason: HOSPADM

## 2021-07-27 RX ORDER — ONDANSETRON 2 MG/ML
4 INJECTION INTRAMUSCULAR; INTRAVENOUS EVERY 30 MIN PRN
Status: DISCONTINUED | OUTPATIENT
Start: 2021-07-27 | End: 2021-07-28 | Stop reason: HOSPADM

## 2021-07-27 RX ORDER — ONDANSETRON 4 MG/1
4 TABLET, ORALLY DISINTEGRATING ORAL EVERY 30 MIN PRN
Status: DISCONTINUED | OUTPATIENT
Start: 2021-07-27 | End: 2021-07-28 | Stop reason: HOSPADM

## 2021-07-27 RX ORDER — ONDANSETRON 2 MG/ML
4 INJECTION INTRAMUSCULAR; INTRAVENOUS
Status: DISCONTINUED | OUTPATIENT
Start: 2021-07-27 | End: 2021-07-28 | Stop reason: HOSPADM

## 2021-07-27 RX ORDER — ACETAMINOPHEN 325 MG/1
975 TABLET ORAL EVERY 4 HOURS PRN
Status: DISCONTINUED | OUTPATIENT
Start: 2021-07-27 | End: 2021-07-28 | Stop reason: HOSPADM

## 2021-07-27 RX ORDER — FENTANYL CITRATE 50 UG/ML
25-50 INJECTION, SOLUTION INTRAMUSCULAR; INTRAVENOUS EVERY 5 MIN PRN
Status: ACTIVE | OUTPATIENT
Start: 2021-07-27 | End: 2021-07-27

## 2021-07-27 RX ORDER — OXYCODONE HYDROCHLORIDE 5 MG/1
5 TABLET ORAL EVERY 4 HOURS PRN
Status: DISCONTINUED | OUTPATIENT
Start: 2021-07-27 | End: 2021-07-28 | Stop reason: HOSPADM

## 2021-07-27 RX ADMIN — PROPOFOL 200 MCG/KG/MIN: 10 INJECTION, EMULSION INTRAVENOUS at 09:22

## 2021-07-27 RX ADMIN — LIDOCAINE HYDROCHLORIDE 50 MG: 20 INJECTION, SOLUTION INFILTRATION; PERINEURAL at 09:19

## 2021-07-27 RX ADMIN — ACETAMINOPHEN 975 MG: 325 TABLET ORAL at 10:29

## 2021-07-27 RX ADMIN — PROPOFOL 30 MG: 10 INJECTION, EMULSION INTRAVENOUS at 09:23

## 2021-07-27 RX ADMIN — PROPOFOL 150 MCG/KG/MIN: 10 INJECTION, EMULSION INTRAVENOUS at 09:44

## 2021-07-27 RX ADMIN — SODIUM CHLORIDE, SODIUM LACTATE, POTASSIUM CHLORIDE, CALCIUM CHLORIDE: 600; 310; 30; 20 INJECTION, SOLUTION INTRAVENOUS at 09:05

## 2021-07-27 ASSESSMENT — MIFFLIN-ST. JEOR: SCORE: 1350.65

## 2021-07-27 NOTE — ANESTHESIA PREPROCEDURE EVALUATION
Anesthesia Pre-Procedure Evaluation    Patient: Emily Fritz   MRN: [id number] : 1965        Preoperative Diagnosis: Family history of colonic polyps [Z83.71]   Procedure : Procedure(s):  COLONOSCOPY, WITH POLYPECTOMY AND BIOPSY     Past Medical History:   Diagnosis Date     Allergic rhinitis due to animal dander      Chronic constipation      Diagnostic skin and sensitization tests 12 skin tests pos. for: dog(+)/DM/CR     Family history of breast cancer in mother 12/10/2014     Family history of breast cancer in mother      Family history of colonic polyps 12/10/2014     Family history of colonic polyps 12/10/2014     Family history of colonic polyps      Heart palpitations 3/2009    Holter     House dust mite allergy      Lateral epicondylitis 3/28/2012     Rhinitis, allergic to other allergen       Past Surgical History:   Procedure Laterality Date     C/SECTION, LOW TRANSVERSE  1988     CHOLECYSTECTOMY       DILATION AND CURETTAGE, HYSTEROSCOPY DIAGNOSTIC, COMBINED  16    Menometrorrhagia      Allergies   Allergen Reactions     Nkda [No Known Drug Allergies]       Social History     Tobacco Use     Smoking status: Former Smoker     Packs/day: 0.00     Years: 10.00     Pack years: 0.00     Quit date: 1995     Years since quittin.5     Smokeless tobacco: Never Used     Tobacco comment: lives in smoke free household   Substance Use Topics     Alcohol use: Yes      Wt Readings from Last 1 Encounters:   21 68 kg (150 lb)        Anesthesia Evaluation   Pt has had prior anesthetic.     No history of anesthetic complications       ROS/MED HX  ENT/Pulmonary:  - neg pulmonary ROS     Neurologic:     (+) migraines,     Cardiovascular:  - neg cardiovascular ROS     METS/Exercise Tolerance: >4 METS    Hematologic:  - neg hematologic  ROS     Musculoskeletal:  - neg musculoskeletal ROS     GI/Hepatic:     (+) GERD, Asymptomatic on medication,     Renal/Genitourinary:  - neg Renal ROS      Endo:  - neg endo ROS     Psychiatric/Substance Use:  - neg psychiatric ROS     Infectious Disease:  - neg infectious disease ROS     Malignancy:  - neg malignancy ROS     Other:  - neg other ROS          Physical Exam    Airway      Comment: Torus mandibularis         Respiratory Devices and Support         Dental  no notable dental history         Cardiovascular   cardiovascular exam normal          Pulmonary   pulmonary exam normal                OUTSIDE LABS:  CBC:   Lab Results   Component Value Date    WBC 6.5 11/07/2019    WBC 6.2 10/02/2018    HGB 13.8 11/07/2019    HGB 13.5 10/02/2018    HCT 42.3 11/07/2019    HCT 41.2 10/02/2018     11/07/2019     10/02/2018     BMP:   Lab Results   Component Value Date     11/17/2014     04/08/2009    POTASSIUM 4.2 11/17/2014    POTASSIUM 4.2 04/08/2009    CHLORIDE 107 11/17/2014    CHLORIDE 107 04/08/2009    CO2 27 11/17/2014    CO2 26 04/08/2009    BUN 17 11/17/2014    BUN 13 04/08/2009    CR 0.70 11/17/2014    CR 0.68 04/08/2009    GLC 87 05/14/2019    GLC 93 11/17/2014     COAGS: No results found for: PTT, INR, FIBR  POC:   Lab Results   Component Value Date    HCG  12/08/2010     Negative   This test provides a presumptive diagnosis of pregnancy or non-pregnancy. A   confirmed pregnancy diagnosis should only be made by a physician after all   clinical and laboratory findings have been evaluated.     HEPATIC:   Lab Results   Component Value Date    ALBUMIN 3.7 09/18/2018    PROTTOTAL 7.2 09/18/2018    ALT 21 09/18/2018    AST 21 09/18/2018    ALKPHOS 96 09/18/2018    BILITOTAL 0.6 09/18/2018     OTHER:   Lab Results   Component Value Date    BETTY 8.7 11/17/2014    LIPASE 127 12/08/2010    TSH 2.47 04/05/2013    CRP <5.0 04/05/2013    SED 10 04/05/2013       Anesthesia Plan    ASA Status:  1   NPO Status:  NPO Appropriate    Anesthesia Type: MAC.     - Reason for MAC: straight local not clinically adequate   Induction: Intravenous.    Maintenance: TIVA.        Consents    Anesthesia Plan(s) and associated risks, benefits, and realistic alternatives discussed. Questions answered and patient/representative(s) expressed understanding.     - Discussed with:  Patient         Postoperative Care       PONV prophylaxis: Ondansetron (or other 5HT-3), Background Propofol Infusion     Comments:                Silver Martínez MD

## 2021-07-27 NOTE — H&P
Emily Fritz  [id number]  female  56 year old      Reason for procedure/surgery: surveillance    Patient Active Problem List   Diagnosis     CARDIOVASCULAR SCREENING; LDL GOAL LESS THAN 160     Cholelithiasis     Diagnostic skin and sensitization tests     Allergic rhinitis due to animal dander     Rhinitis, allergic to other allergen     House dust mite allergy     Lateral epicondylitis     Vitamin D deficiency disease     Heart palpitations     Family history of colonic polyps     Family history of breast cancer in mother     Gastroesophageal reflux disease without esophagitis     Menorrhagia with regular cycle     Dry eyes     Migraine with aura and without status migrainosus, not intractable       Past Surgical History:    Past Surgical History:   Procedure Laterality Date     C/SECTION, LOW TRANSVERSE  1988     CHOLECYSTECTOMY       DILATION AND CURETTAGE, HYSTEROSCOPY DIAGNOSTIC, COMBINED  16    Menometrorrhagia       Past Medical History:   Past Medical History:   Diagnosis Date     Allergic rhinitis due to animal dander      Chronic constipation      Diagnostic skin and sensitization tests 12 skin tests pos. for: dog(+)/DM/CR     Family history of breast cancer in mother 12/10/2014     Family history of breast cancer in mother      Family history of colonic polyps 12/10/2014     Family history of colonic polyps 12/10/2014     Family history of colonic polyps      Heart palpitations 3/2009    Holter     House dust mite allergy      Lateral epicondylitis 3/28/2012     Rhinitis, allergic to other allergen        Social History:   Social History     Tobacco Use     Smoking status: Former Smoker     Packs/day: 0.00     Years: 10.00     Pack years: 0.00     Quit date: 1995     Years since quittin.5     Smokeless tobacco: Never Used     Tobacco comment: lives in smoke free household   Substance Use Topics     Alcohol use: Yes       Family History:   Family History   Problem Relation Age  "of Onset     Diabetes Mother 78     Hypertension Mother      Thyroid Disease Mother      Cancer Mother 78        breast-mastectomy     Circulatory Mother         DVT     Breast Cancer Mother 79     Hypertension Father      Cerebrovascular Disease Father      Heart Disease Father         CHF     Hypertension Sister      Other Cancer Sister         Carcinoid Cancer     Breast Cancer Maternal Aunt         postmenopausal     Macular Degeneration Paternal Aunt      Breast Cancer Maternal Aunt         postmenopausal     Gastrointestinal Disease Sister         gastric carcinoid tumors with mets to liver     Depression Brother         Suicide 7/8/2020     Glaucoma No family hx of        Allergies:   Allergies   Allergen Reactions     Nkda [No Known Drug Allergies]        Active Medications:   Current Outpatient Medications   Medication Sig Dispense Refill     azelastine (ASTELIN) 0.1 % spray Spray 1-2 sprays into both nostrils 2 times daily 3 Bottle 0     [facility] PURELAX powder   6     fluticasone (FLONASE) 50 MCG/ACT nasal spray Spray 2 sprays into both nostrils daily 16 g 1     omeprazole (PRILOSEC) 20 MG CR capsule Take 1 capsule (20 mg) by mouth daily 90 capsule 3     rizatriptan (MAXALT) 10 MG tablet Take 1 tablet (10 mg) by mouth at onset of headache for migraine (appointment needed for refills) May repeat in 2 hours. Max 3 tablets/24 hours. 18 tablet 3       Systemic Review:   CONSTITUTIONAL: NEGATIVE for fever, chills, change in weight  ENT/MOUTH: NEGATIVE for ear, mouth and throat problems  RESP: NEGATIVE for significant cough or SOB  CV: NEGATIVE for chest pain, palpitations or peripheral edema    Physical Examination:   Vital Signs: /89   Pulse 81   Temp 98  F (36.7  C) (Temporal)   Resp 18   Ht 1.778 m (5' 10\")   Wt 68 kg (150 lb)   LMP  (LMP Unknown)   SpO2 96%   BMI 21.52 kg/m    GENERAL: healthy, alert and no distress  NECK: no adenopathy, no asymmetry, masses, or scars  RESP: lungs clear to " auscultation - no rales, rhonchi or wheezes  CV: regular rate and rhythm, normal S1 S2, no S3 or S4, no murmur, click or rub, no peripheral edema and peripheral pulses strong  ABDOMEN: soft, nontender, no hepatosplenomegaly, no masses and bowel sounds normal  MS: no gross musculoskeletal defects noted, no edema    Plan: Appropriate to proceed as scheduled.      Sonia Brenner MD  7/27/2021    PCP:  Kehr, Kristen M

## 2021-07-27 NOTE — ANESTHESIA CARE TRANSFER NOTE
Patient: Emily Fritz    Procedure(s):  COLONOSCOPY, WITH POLYPECTOMY AND BIOPSY    Diagnosis: Family history of colonic polyps [Z83.71]  Diagnosis Additional Information: No value filed.    Anesthesia Type:   No value filed.     Note:    Oropharynx: spontaneously breathing    Oxygen Supplementation: room air    Independent Airway: airway patency satisfactory and stable  Dentition: dentition unchanged  Vital Signs Stable: post-procedure vital signs reviewed and stable  Report to RN Given: handoff report given  Patient transferred to: Phase II    Handoff Report: Identifed the Patient, Identified the Reponsible Provider, Reviewed the pertinent medical history, Discussed the surgical course, Reviewed Intra-OP anesthesia mangement and issues during anesthesia, Set expectations for post-procedure period and Allowed opportunity for questions and acknowledgement of understanding      Vitals:  Vitals Value Taken Time   /69 07/27/21 1004   Temp 36.1  C (97  F) 07/27/21 1004   Pulse 72 07/27/21 1004   Resp 16 07/27/21 1004   SpO2 99 % 07/27/21 1004       Electronically Signed By: LEANDRO Jacobs CRNA  July 27, 2021  10:06 AM

## 2021-07-27 NOTE — ANESTHESIA POSTPROCEDURE EVALUATION
Patient: Emily Fritz    Procedure(s):  COLONOSCOPY, WITH POLYPECTOMY AND BIOPSY    Diagnosis:Family history of colonic polyps [Z83.71]  Diagnosis Additional Information: No value filed.    Anesthesia Type:  MAC    Note:  Disposition: Outpatient   Postop Pain Control: Uneventful            Sign Out: Well controlled pain   PONV: No   Neuro/Psych: Uneventful            Sign Out: Acceptable/Baseline neuro status   Airway/Respiratory: Uneventful            Sign Out: Acceptable/Baseline resp. status   CV/Hemodynamics: Uneventful            Sign Out: Acceptable CV status; No obvious hypovolemia; No obvious fluid overload   Other NRE: NONE   DID A NON-ROUTINE EVENT OCCUR? No           Last vitals:  Vitals Value Taken Time   /69 07/27/21 1012   Temp 36.1  C (97  F) 07/27/21 1004   Pulse 71 07/27/21 1012   Resp 18 07/27/21 1012   SpO2 97 % 07/27/21 1012       Electronically Signed By: Silver Martínez MD  July 27, 2021  3:14 PM

## 2021-07-28 LAB
PATH REPORT.COMMENTS IMP SPEC: NORMAL
PATH REPORT.COMMENTS IMP SPEC: NORMAL
PATH REPORT.FINAL DX SPEC: NORMAL
PATH REPORT.GROSS SPEC: NORMAL
PATH REPORT.MICROSCOPIC SPEC OTHER STN: NORMAL
PATH REPORT.RELEVANT HX SPEC: NORMAL
PHOTO IMAGE: NORMAL

## 2021-07-28 NOTE — TELEPHONE ENCOUNTER
Referral placed, may be several weeks before they can see her.  It can often take several weeks for fungal infections to resolve, so may just take more time.  Patient could try to schedule with derm now, and cancel if improved by then. Jaimee Traore, CNP

## 2021-08-16 ENCOUNTER — MYC MEDICAL ADVICE (OUTPATIENT)
Dept: FAMILY MEDICINE | Facility: CLINIC | Age: 56
End: 2021-08-16

## 2021-08-16 DIAGNOSIS — R21 RASH: Primary | ICD-10-CM

## 2021-08-16 RX ORDER — TRIAMCINOLONE ACETONIDE 1 MG/G
CREAM TOPICAL 2 TIMES DAILY
Qty: 45 G | Refills: 0 | Status: SHIPPED | OUTPATIENT
Start: 2021-08-16 | End: 2021-09-02

## 2021-09-02 DIAGNOSIS — R21 RASH: ICD-10-CM

## 2021-09-02 RX ORDER — TRIAMCINOLONE ACETONIDE 1 MG/G
CREAM TOPICAL 2 TIMES DAILY
Qty: 45 G | Refills: 0 | Status: SHIPPED | OUTPATIENT
Start: 2021-09-02 | End: 2021-11-02

## 2021-10-13 ENCOUNTER — OFFICE VISIT (OUTPATIENT)
Dept: FAMILY MEDICINE | Facility: CLINIC | Age: 56
End: 2021-10-13
Payer: COMMERCIAL

## 2021-10-13 VITALS
BODY MASS INDEX: 21.81 KG/M2 | SYSTOLIC BLOOD PRESSURE: 128 MMHG | TEMPERATURE: 98.1 F | WEIGHT: 152 LBS | HEART RATE: 94 BPM | DIASTOLIC BLOOD PRESSURE: 88 MMHG | OXYGEN SATURATION: 99 %

## 2021-10-13 DIAGNOSIS — R10.9 RIGHT FLANK DISCOMFORT: Primary | ICD-10-CM

## 2021-10-13 DIAGNOSIS — Z23 NEED FOR PROPHYLACTIC VACCINATION AND INOCULATION AGAINST INFLUENZA: ICD-10-CM

## 2021-10-13 DIAGNOSIS — E83.119 HEMOCHROMATOSIS, UNSPECIFIED HEMOCHROMATOSIS TYPE: ICD-10-CM

## 2021-10-13 LAB
ALBUMIN SERPL-MCNC: 3.8 G/DL (ref 3.4–5)
ALBUMIN UR-MCNC: NEGATIVE MG/DL
ALP SERPL-CCNC: 102 U/L (ref 40–150)
ALT SERPL W P-5'-P-CCNC: 23 U/L (ref 0–50)
ANION GAP SERPL CALCULATED.3IONS-SCNC: 4 MMOL/L (ref 3–14)
APPEARANCE UR: ABNORMAL
AST SERPL W P-5'-P-CCNC: 21 U/L (ref 0–45)
BACTERIA #/AREA URNS HPF: ABNORMAL /HPF
BASOPHILS # BLD AUTO: 0 10E3/UL (ref 0–0.2)
BASOPHILS NFR BLD AUTO: 1 %
BILIRUB SERPL-MCNC: 0.5 MG/DL (ref 0.2–1.3)
BILIRUB UR QL STRIP: NEGATIVE
BUN SERPL-MCNC: 15 MG/DL (ref 7–30)
CALCIUM SERPL-MCNC: 9.1 MG/DL (ref 8.5–10.1)
CHLORIDE BLD-SCNC: 109 MMOL/L (ref 94–109)
CO2 SERPL-SCNC: 28 MMOL/L (ref 20–32)
COLOR UR AUTO: YELLOW
CREAT SERPL-MCNC: 0.85 MG/DL (ref 0.52–1.04)
EOSINOPHIL # BLD AUTO: 0.1 10E3/UL (ref 0–0.7)
EOSINOPHIL NFR BLD AUTO: 1 %
ERYTHROCYTE [DISTWIDTH] IN BLOOD BY AUTOMATED COUNT: 12.5 % (ref 10–15)
FERRITIN SERPL-MCNC: 103 NG/ML (ref 8–252)
GFR SERPL CREATININE-BSD FRML MDRD: 77 ML/MIN/1.73M2
GLUCOSE BLD-MCNC: 82 MG/DL (ref 70–99)
GLUCOSE UR STRIP-MCNC: NEGATIVE MG/DL
HCT VFR BLD AUTO: 43.5 % (ref 35–47)
HGB BLD-MCNC: 14.7 G/DL (ref 11.7–15.7)
HGB UR QL STRIP: ABNORMAL
IRON SATN MFR SERPL: 55 % (ref 15–46)
IRON SERPL-MCNC: 149 UG/DL (ref 35–180)
KETONES UR STRIP-MCNC: NEGATIVE MG/DL
LEUKOCYTE ESTERASE UR QL STRIP: NEGATIVE
LYMPHOCYTES # BLD AUTO: 1.2 10E3/UL (ref 0.8–5.3)
LYMPHOCYTES NFR BLD AUTO: 26 %
MCH RBC QN AUTO: 33 PG (ref 26.5–33)
MCHC RBC AUTO-ENTMCNC: 33.8 G/DL (ref 31.5–36.5)
MCV RBC AUTO: 98 FL (ref 78–100)
MONOCYTES # BLD AUTO: 0.5 10E3/UL (ref 0–1.3)
MONOCYTES NFR BLD AUTO: 10 %
MUCOUS THREADS #/AREA URNS LPF: PRESENT /LPF
NEUTROPHILS # BLD AUTO: 3 10E3/UL (ref 1.6–8.3)
NEUTROPHILS NFR BLD AUTO: 63 %
NITRATE UR QL: POSITIVE
PH UR STRIP: 5.5 [PH] (ref 5–7)
PLATELET # BLD AUTO: 210 10E3/UL (ref 150–450)
POTASSIUM BLD-SCNC: 4 MMOL/L (ref 3.4–5.3)
PROT SERPL-MCNC: 7.3 G/DL (ref 6.8–8.8)
RBC # BLD AUTO: 4.45 10E6/UL (ref 3.8–5.2)
RBC #/AREA URNS AUTO: ABNORMAL /HPF
SODIUM SERPL-SCNC: 141 MMOL/L (ref 133–144)
SP GR UR STRIP: 1.02 (ref 1–1.03)
SQUAMOUS #/AREA URNS AUTO: ABNORMAL /LPF
TIBC SERPL-MCNC: 270 UG/DL (ref 240–430)
UROBILINOGEN UR STRIP-ACNC: 0.2 E.U./DL
WBC # BLD AUTO: 4.8 10E3/UL (ref 4–11)
WBC #/AREA URNS AUTO: ABNORMAL /HPF

## 2021-10-13 PROCEDURE — 81001 URINALYSIS AUTO W/SCOPE: CPT | Performed by: PHYSICIAN ASSISTANT

## 2021-10-13 PROCEDURE — 90682 RIV4 VACC RECOMBINANT DNA IM: CPT | Performed by: PHYSICIAN ASSISTANT

## 2021-10-13 PROCEDURE — 90471 IMMUNIZATION ADMIN: CPT | Performed by: PHYSICIAN ASSISTANT

## 2021-10-13 PROCEDURE — 82728 ASSAY OF FERRITIN: CPT | Performed by: PHYSICIAN ASSISTANT

## 2021-10-13 PROCEDURE — 87186 SC STD MICRODIL/AGAR DIL: CPT | Performed by: PHYSICIAN ASSISTANT

## 2021-10-13 PROCEDURE — 85025 COMPLETE CBC W/AUTO DIFF WBC: CPT | Performed by: PHYSICIAN ASSISTANT

## 2021-10-13 PROCEDURE — 83550 IRON BINDING TEST: CPT | Performed by: PHYSICIAN ASSISTANT

## 2021-10-13 PROCEDURE — 80053 COMPREHEN METABOLIC PANEL: CPT | Performed by: PHYSICIAN ASSISTANT

## 2021-10-13 PROCEDURE — 87086 URINE CULTURE/COLONY COUNT: CPT | Performed by: PHYSICIAN ASSISTANT

## 2021-10-13 PROCEDURE — 36415 COLL VENOUS BLD VENIPUNCTURE: CPT | Performed by: PHYSICIAN ASSISTANT

## 2021-10-13 PROCEDURE — 99214 OFFICE O/P EST MOD 30 MIN: CPT | Mod: 25 | Performed by: PHYSICIAN ASSISTANT

## 2021-10-13 ASSESSMENT — PAIN SCALES - GENERAL: PAINLEVEL: NO PAIN (0)

## 2021-10-13 NOTE — PROGRESS NOTES
Assessment & Plan     Right flank discomfort  The following tests ordered.   I will send results via [email or web address].   Consider CT for possible kidney stone.   - CBC with platelets and differential; Future  - Comprehensive metabolic panel (BMP + Alb, Alk Phos, ALT, AST, Total. Bili, TP); Future  - UA Macro with Reflex to Micro and Culture - lab collect; Future  -- CBC with platelets and differential  - Comprehensive metabolic panel (BMP + Alb, Alk Phos, ALT, AST, Total. Bili, TP)  - UA Macro with Reflex to Micro and Culture - lab collect  - Urine Microscopic Exam  - Urine Culture    Hemochromatosis, unspecified hemochromatosis type  She has her levels checked regularly and requests this to be added since lab tests are being completed today.   She manages with frequent blood donation.   - Iron and iron binding capacity; Future  - Ferritin; Future  - Iron and iron binding capacity  - Ferritin    Need for prophylactic vaccination and inoculation against influenza  - INFLUENZA QUAD, RECOMBINANT, P-FREE (RIV4) (FLUBLOK)                 Return in about 1 day (around 10/14/2021) for watch [employer] for results and instructions.    Kristen M. Kehr, PA-C  [facility] MUNIRA Hunter is a 56 year old who presents for the following health issues     HPI     Concern - low back pain  Onset: couple months/ flank pain, mainly on the right side.   Description: last week couple of night 7-8/10, once in awhile jab pain per patient  Intensity: varies  Progression of Symptoms:  constant  Accompanying Signs & Symptoms:   Previous history of similar problem:   Precipitating factors:        Worsened by: worse when laying down  Alleviating factors:        Improved by: none  Therapies tried and outcome:         Review of Systems   Constitutional, HEENT, cardiovascular, pulmonary, GI, , musculoskeletal, neuro, skin, endocrine and psych systems are negative, except as otherwise noted.      Objective    /88   Pulse  94   Temp 98.1  F (36.7  C) (Tympanic)   Wt 152 lb (68.9 kg)   LMP  (LMP Unknown)   SpO2 99%   BMI 21.81 kg/m    Body mass index is 21.81 kg/m .  Physical Exam   GENERAL: healthy, alert and no distress  MS: no gross musculoskeletal defects noted, no edema  SKIN: no suspicious lesions or rashes  NEURO: Normal strength and tone, mentation intact and speech normal  BACK: no CVA tenderness, no paralumbar tenderness  Comprehensive back pain exam:  No tenderness, Range of motion not limited by pain and Lower extremity strength functional and equal on both sides  PSYCH: mentation appears normal, affect normal/bright    pending

## 2021-10-13 NOTE — PATIENT INSTRUCTIONS
I will contact you with results of your tests from today and determine appropriate imaging.     Contact [facility] [phone] to schedule appointment for imaging after you get results back.

## 2021-10-13 NOTE — NURSING NOTE
"Chief Complaint   Patient presents with     Back Pain       Initial /88   Pulse 94   Temp 98.1  F (36.7  C) (Tympanic)   Wt 152 lb (68.9 kg)   LMP  (LMP Unknown)   SpO2 99%   BMI 21.81 kg/m   Estimated body mass index is 21.81 kg/m  as calculated from the following:    Height as of 7/27/21: 5' 10\" (1.778 m).    Weight as of this encounter: 152 lb (68.9 kg).  Medication Reconciliation: complete    SOFIA Diaz MA    "

## 2021-10-14 ENCOUNTER — MYC MEDICAL ADVICE (OUTPATIENT)
Dept: FAMILY MEDICINE | Facility: CLINIC | Age: 56
End: 2021-10-14

## 2021-10-14 DIAGNOSIS — R30.0 DYSURIA: Primary | ICD-10-CM

## 2021-10-14 RX ORDER — SULFAMETHOXAZOLE/TRIMETHOPRIM 800-160 MG
1 TABLET ORAL 2 TIMES DAILY
Qty: 14 TABLET | Refills: 0 | Status: SHIPPED | OUTPATIENT
Start: 2021-10-14 | End: 2021-10-21

## 2021-10-15 DIAGNOSIS — R30.0 DYSURIA: Primary | ICD-10-CM

## 2021-10-15 LAB — BACTERIA UR CULT: ABNORMAL

## 2021-10-15 RX ORDER — NITROFURANTOIN 25; 75 MG/1; MG/1
100 CAPSULE ORAL 2 TIMES DAILY
Qty: 14 CAPSULE | Refills: 0 | Status: SHIPPED | OUTPATIENT
Start: 2021-10-15 | End: 2022-09-12

## 2021-10-27 ENCOUNTER — LAB (OUTPATIENT)
Dept: LAB | Facility: CLINIC | Age: 56
End: 2021-10-27
Payer: COMMERCIAL

## 2021-10-27 ENCOUNTER — MYC MEDICAL ADVICE (OUTPATIENT)
Dept: FAMILY MEDICINE | Facility: CLINIC | Age: 56
End: 2021-10-27

## 2021-10-27 DIAGNOSIS — R10.9 RIGHT FLANK DISCOMFORT: Primary | ICD-10-CM

## 2021-10-27 DIAGNOSIS — R10.9 RIGHT FLANK DISCOMFORT: ICD-10-CM

## 2021-10-27 LAB
ALBUMIN UR-MCNC: NEGATIVE MG/DL
APPEARANCE UR: CLEAR
BILIRUB UR QL STRIP: NEGATIVE
COLOR UR AUTO: YELLOW
GLUCOSE UR STRIP-MCNC: NEGATIVE MG/DL
HGB UR QL STRIP: NEGATIVE
KETONES UR STRIP-MCNC: NEGATIVE MG/DL
LEUKOCYTE ESTERASE UR QL STRIP: NEGATIVE
NITRATE UR QL: NEGATIVE
PH UR STRIP: 5 [PH] (ref 5–7)
SP GR UR STRIP: >=1.03 (ref 1–1.03)
UROBILINOGEN UR STRIP-ACNC: 0.2 E.U./DL

## 2021-10-27 PROCEDURE — 81003 URINALYSIS AUTO W/O SCOPE: CPT

## 2021-10-28 ENCOUNTER — MYC MEDICAL ADVICE (OUTPATIENT)
Dept: FAMILY MEDICINE | Facility: CLINIC | Age: 56
End: 2021-10-28

## 2021-11-02 ENCOUNTER — OFFICE VISIT (OUTPATIENT)
Dept: FAMILY MEDICINE | Facility: CLINIC | Age: 56
End: 2021-11-02
Payer: COMMERCIAL

## 2021-11-02 VITALS
HEART RATE: 75 BPM | BODY MASS INDEX: 21.95 KG/M2 | SYSTOLIC BLOOD PRESSURE: 132 MMHG | TEMPERATURE: 97.5 F | DIASTOLIC BLOOD PRESSURE: 82 MMHG | WEIGHT: 153 LBS | OXYGEN SATURATION: 100 %

## 2021-11-02 DIAGNOSIS — G89.29 CHRONIC RIGHT FLANK PAIN: Primary | ICD-10-CM

## 2021-11-02 DIAGNOSIS — R10.9 CHRONIC RIGHT FLANK PAIN: Primary | ICD-10-CM

## 2021-11-02 PROCEDURE — 99213 OFFICE O/P EST LOW 20 MIN: CPT | Performed by: PHYSICIAN ASSISTANT

## 2021-11-02 ASSESSMENT — PAIN SCALES - GENERAL: PAINLEVEL: MILD PAIN (3)

## 2021-11-02 NOTE — PATIENT INSTRUCTIONS
Contact [facility] [phone] to schedule appointment for CT scan.  Watch [employer] for results and instructions 1-2 days after the test

## 2021-11-02 NOTE — PROGRESS NOTES
Assessment & Plan     Chronic right flank pain  Pain occurring x 6 months.   No improvement with treatment at chiropractor   Get further imaging with CT scan.   All lab tests have been normal checked originally when she had urinary infection.   I will contact her with results and instructions.   Consider Physical Therapy for muscular cause if testing is normal / negative.   - CT Abdomen Pelvis w/o & w Contrast; Future                 Return in about 1 week (around 11/9/2021) for other / appointment for CT scan.    Kristen M. Kehr, PA-C [facility]   Elsa is a 56 year old who presents for the following health issues     Elsa has been having right flank pain x 6 months. She has been seeing a chiropractor and adjusting is not helping. She was treated for a urinary infection and that did not help. She repeated the urine test and it was normal / negative. All other lab tests done a month ago were normal. She continues to have colicky pain. She has not been able to determine any exacerbating factors. There are good days and bad days. There is some wrap around quality to her abdomen.     HPI     Concern - right flank pain  Onset: months  Description: once in awhile jabbing pain  Intensity: current3/10  Progression of Symptoms:  Worsening with the jabbing pain  Accompanying Signs & Symptoms:   Previous history of similar problem:   Precipitating factors:        Worsened by:   Alleviating factors:        Improved by:   Therapies tried and outcome:         Review of Systems   Constitutional, HEENT, cardiovascular, pulmonary, GI, , musculoskeletal, neuro, skin, endocrine and psych systems are negative, except as otherwise noted.      Objective    BP (!) 148/86   Pulse 75   Temp 97.5  F (36.4  C) (Tympanic)   Wt 69.4 kg (153 lb)   LMP  (LMP Unknown)   SpO2 100%   BMI 21.95 kg/m    Body mass index is 21.95 kg/m .  Physical Exam   GENERAL: healthy, alert and no distress  RESP: lungs  clear to auscultation - no rales, rhonchi or wheezes  CV: regular rate and rhythm, normal S1 S2, no S3 or S4, no murmur, click or rub, no peripheral edema and peripheral pulses strong  ABDOMEN: soft, nontender, no hepatosplenomegaly, no masses and bowel sounds normal  MS: extremities normal- no gross deformities noted  SKIN: no suspicious lesions or rashes  BACK: Right flank tenderness / no tension of paralumbar muscles or obvious deformities palpable in the area of pain. Normal ROM of her low back.   Strength in lower extremities intact.   PSYCH: mentation appears normal, affect normal/bright    Lab on 10/27/2021   Component Date Value Ref Range Status     Color Urine 10/27/2021 Yellow  Colorless, Straw, Light Yellow, Yellow Final     Appearance Urine 10/27/2021 Clear  Clear Final     Glucose Urine 10/27/2021 Negative  Negative mg/dL Final     Bilirubin Urine 10/27/2021 Negative  Negative Final     Ketones Urine 10/27/2021 Negative  Negative mg/dL Final     Specific Gravity Urine 10/27/2021 >=1.030  1.003 - 1.035 Final     Blood Urine 10/27/2021 Negative  Negative Final     pH Urine 10/27/2021 5.0  5.0 - 7.0 Final     Protein Albumin Urine 10/27/2021 Negative  Negative mg/dL Final     Urobilinogen Urine 10/27/2021 0.2  0.2, 1.0 E.U./dL Final     Nitrite Urine 10/27/2021 Negative  Negative Final     Leukocyte Esterase Urine 10/27/2021 Negative  Negative Final

## 2021-11-02 NOTE — PROGRESS NOTES
"  {PROVIDER CHARTING PREFERENCE:369800}    Maribel Hunter is a 56 year old who presents for the following health issues {ACCOMPANIED BY STATEMENT (Optional):210747}    HPI     Flank Pain  Onset/Duration: ***  Description:   Character: {.:467761}  Location: {.:674551}  Radiation: {.:917829::\"None\"}  Intensity: {.:722137}  Progression of Symptoms:  {.:423654}  Accompanying Signs & Symptoms:  Fever/Chills: {.:914374::\"no\"}  Gas/Bloating: {.:487064::\"no\"}  Nausea: {.:170280::\"no\"}  Vomitting: {.:482504::\"no\"}  Diarrhea: {.:011808::\"no\"}  Constipation: {.:808979::\"no\"}  Dysuria or Hematuria: {.:915497::\"no\"}  History:   Trauma: {.:195864::\"no\"}  Previous similar pain: {.:730876::\"no\"}  Previous tests done: { .:751005}  Precipitating factors:   Does the pain change with:     Food: {.:559170::\"no\"}    Bowel Movement: {.:107389::\"no\"}    Urination: {.:633033::\"no\"}   Other factors:  {.:958612::\"no\"}  Therapies tried and outcome: {NONEORCHOOSE:926945::\"None\"}  No LMP recorded (lmp unknown). Patient is postmenopausal.      {additonal problems for provider to add (Optional):345283}    Review of Systems   {ROS COMP (Optional):750496}      Objective    LMP  (LMP Unknown)   There is no height or weight on file to calculate BMI.  Physical Exam   {Exam List (Optional):597222}    {Diagnostic Test Results (Optional):737310}    {AMBULATORY ATTESTATION (Optional):637915}        "

## 2021-11-02 NOTE — NURSING NOTE
"Chief Complaint   Patient presents with     Flank Pain     right side       Initial BP (!) 148/86   Pulse 75   Temp 97.5  F (36.4  C) (Tympanic)   Wt 69.4 kg (153 lb)   LMP  (LMP Unknown)   SpO2 100%   BMI 21.95 kg/m   Estimated body mass index is 21.95 kg/m  as calculated from the following:    Height as of 7/27/21: 1.778 m (5' 10\").    Weight as of this encounter: 69.4 kg (153 lb).  Medication Reconciliation: complete    SOFIA Diaz MA    "

## 2021-11-03 ENCOUNTER — ANCILLARY PROCEDURE (OUTPATIENT)
Dept: CT IMAGING | Facility: CLINIC | Age: 56
End: 2021-11-03
Attending: PHYSICIAN ASSISTANT
Payer: COMMERCIAL

## 2021-11-03 DIAGNOSIS — R10.9 CHRONIC RIGHT FLANK PAIN: ICD-10-CM

## 2021-11-03 DIAGNOSIS — R10.9 RIGHT FLANK DISCOMFORT: Primary | ICD-10-CM

## 2021-11-03 DIAGNOSIS — G89.29 CHRONIC RIGHT FLANK PAIN: ICD-10-CM

## 2021-11-03 PROCEDURE — 74178 CT ABD&PLV WO CNTR FLWD CNTR: CPT | Mod: TC | Performed by: RADIOLOGY

## 2021-11-03 RX ORDER — IOPAMIDOL 755 MG/ML
100 INJECTION, SOLUTION INTRAVASCULAR ONCE
Status: COMPLETED | OUTPATIENT
Start: 2021-11-03 | End: 2021-11-03

## 2021-11-03 RX ADMIN — IOPAMIDOL 100 ML: 755 INJECTION, SOLUTION INTRAVASCULAR at 08:14

## 2021-12-02 ENCOUNTER — E-VISIT (OUTPATIENT)
Dept: FAMILY MEDICINE | Facility: CLINIC | Age: 56
End: 2021-12-02
Payer: COMMERCIAL

## 2021-12-02 DIAGNOSIS — R35.0 URINARY FREQUENCY: ICD-10-CM

## 2021-12-02 DIAGNOSIS — R39.9 SYMPTOMS OF URINARY TRACT INFECTION: Primary | ICD-10-CM

## 2021-12-02 PROCEDURE — 99422 OL DIG E/M SVC 11-20 MIN: CPT | Performed by: PHYSICIAN ASSISTANT

## 2021-12-03 ENCOUNTER — LAB (OUTPATIENT)
Dept: LAB | Facility: CLINIC | Age: 56
End: 2021-12-03
Payer: COMMERCIAL

## 2021-12-03 DIAGNOSIS — R39.9 SYMPTOMS OF URINARY TRACT INFECTION: ICD-10-CM

## 2021-12-03 LAB
ALBUMIN UR-MCNC: NEGATIVE MG/DL
APPEARANCE UR: ABNORMAL
BILIRUB UR QL STRIP: NEGATIVE
COLOR UR AUTO: YELLOW
GLUCOSE UR STRIP-MCNC: NEGATIVE MG/DL
HGB UR QL STRIP: NEGATIVE
HYALINE CASTS: 1 /LPF
KETONES UR STRIP-MCNC: NEGATIVE MG/DL
LEUKOCYTE ESTERASE UR QL STRIP: NEGATIVE
MUCOUS THREADS #/AREA URNS LPF: PRESENT /LPF
NITRATE UR QL: NEGATIVE
PH UR STRIP: 5 [PH] (ref 5–7)
RBC URINE: 1 /HPF
SP GR UR STRIP: 1.03 (ref 1–1.03)
SQUAMOUS EPITHELIAL: <1 /HPF
UROBILINOGEN UR STRIP-MCNC: 2 MG/DL
WBC URINE: 1 /HPF

## 2021-12-03 PROCEDURE — 81001 URINALYSIS AUTO W/SCOPE: CPT

## 2021-12-19 ENCOUNTER — HEALTH MAINTENANCE LETTER (OUTPATIENT)
Age: 56
End: 2021-12-19

## 2022-02-15 ENCOUNTER — OFFICE VISIT (OUTPATIENT)
Dept: OPTOMETRY | Facility: CLINIC | Age: 57
End: 2022-02-15
Payer: COMMERCIAL

## 2022-02-15 DIAGNOSIS — H52.4 PRESBYOPIA: ICD-10-CM

## 2022-02-15 DIAGNOSIS — H04.123 DRY EYES: ICD-10-CM

## 2022-02-15 DIAGNOSIS — H52.03 HYPEROPIA, BILATERAL: Primary | ICD-10-CM

## 2022-02-15 PROCEDURE — 92015 DETERMINE REFRACTIVE STATE: CPT | Performed by: OPTOMETRIST

## 2022-02-15 PROCEDURE — 92014 COMPRE OPH EXAM EST PT 1/>: CPT | Performed by: OPTOMETRIST

## 2022-02-15 ASSESSMENT — REFRACTION_MANIFEST
OS_SPHERE: +2.75
OS_CYLINDER: +0.25
OD_AXIS: 095
OD_SPHERE: +2.50
OD_CYLINDER: +0.50
OD_CYLINDER: SPHERE
METHOD_AUTOREFRACTION: 1
OD_SPHERE: +2.50
OS_AXIS: 090
OS_CYLINDER: +0.50
OS_SPHERE: +2.50
OS_AXIS: 077
OD_ADD: +2.25
OS_ADD: +2.25

## 2022-02-15 ASSESSMENT — REFRACTION_CURRENTRX
OD_DIAMETER: 14.1
OS_ADD: HIGH
OS_SPHERE: +2.25
OS_DIAMETER: 14.1
OD_BASECURVE: 8.6
OD_SPHERE: +2.00
OD_ADD: LOW
OS_BASECURVE: 8.6

## 2022-02-15 ASSESSMENT — VISUAL ACUITY
METHOD: SNELLEN - LINEAR
OD_CC: 20/20
OS_CC: 20/25
OD_CC: 20/25
OS_CC: 20/25
OD_CC+: -1
CORRECTION_TYPE: GLASSES
OS_CC+: -1

## 2022-02-15 ASSESSMENT — TONOMETRY
OS_IOP_MMHG: 10
IOP_METHOD: APPLANATION
OD_IOP_MMHG: 10

## 2022-02-15 ASSESSMENT — CONF VISUAL FIELD
OS_NORMAL: 1
OD_NORMAL: 1
METHOD: COUNTING FINGERS

## 2022-02-15 ASSESSMENT — REFRACTION_WEARINGRX
OD_ADD: +2.25
OD_SPHERE: +2.25
OS_AXIS: 055
OS_ADD: +2.25
OS_SPHERE: +2.25
OD_CYLINDER: SPHERE
OS_CYLINDER: +0.25
SPECS_TYPE: PAL

## 2022-02-15 ASSESSMENT — CUP TO DISC RATIO
OS_RATIO: 0.15
OD_RATIO: 0.15

## 2022-02-15 ASSESSMENT — KERATOMETRY
OS_K2POWER_DIOPTERS: 44.25
OD_K1POWER_DIOPTERS: 42.50
OD_AXISANGLE2_DEGREES: 174
OS_AXISANGLE2_DEGREES: 1
OS_K1POWER_DIOPTERS: 43.00
OD_K2POWER_DIOPTERS: 43.75

## 2022-02-15 ASSESSMENT — EXTERNAL EXAM - LEFT EYE: OS_EXAM: NORMAL

## 2022-02-15 ASSESSMENT — SLIT LAMP EXAM - LIDS
COMMENTS: NORMAL
COMMENTS: NORMAL

## 2022-02-15 ASSESSMENT — EXTERNAL EXAM - RIGHT EYE: OD_EXAM: NORMAL

## 2022-02-15 NOTE — PROGRESS NOTES
Chief Complaint   Patient presents with     COMPREHENSIVE EYE EXAM     CE/ No fit    Patient declined Fitting today      Last Eye Exam: 8/24/20  Dilated Previously: Yes    What are you currently using to see?  glasses and contacts rarely.        Distance Vision Acuity: Satisfied with vision, no changes noted     Near Vision Acuity: Satisfied with vision while reading and using computer with glasses    Eye Comfort: good and dry with the contacts, but feels like that has improved.   Do you use eye drops? : Yes: On occasion as needed   Occupation or Hobbies: , Lots of computer at arms length     Charmaine Apple Optometric Assistant           Medical, surgical and family histories reviewed and updated 2/15/2022.       OBJECTIVE: See Ophthalmology exam    ASSESSMENT:    ICD-10-CM    1. Hyperopia, bilateral  H52.03 EYE EXAM (SIMPLE-NONBILLABLE)     REFRACTION   2. Presbyopia  H52.4 EYE EXAM (SIMPLE-NONBILLABLE)     REFRACTION   3. Dry eyes  H04.123       PLAN:     Patient Instructions   Fill glasses prescription  Continue use of nonpreserved artificial tear in vials as needed   Allow 2 weeks to adapt to change in glasses  Return in 1 year for eye exam, return to clinic in 3-6 months for contact lenses fit if desired or wait until next eye exam     Meera Roach O.D.  [facility]  [address] [place] [address] [phone]

## 2022-02-15 NOTE — LETTER
2/15/2022         RE: Emily Fritz  [id number] [place] [address] [place] [id number]        Dear Colleague,    Thank you for referring your patient, Emily Fritz, to the [facility]. Please see a copy of my visit note below.    Chief Complaint   Patient presents with     COMPREHENSIVE EYE EXAM     CE/ No fit    Patient declined Fitting today      Last Eye Exam: 8/24/20  Dilated Previously: Yes    What are you currently using to see?  glasses and contacts rarely.        Distance Vision Acuity: Satisfied with vision, no changes noted     Near Vision Acuity: Satisfied with vision while reading and using computer with glasses    Eye Comfort: good and dry with the contacts, but feels like that has improved.   Do you use eye drops? : Yes: On occasion as needed   Occupation or Hobbies: , Lots of computer at arms length     Charmaine Apple Optometric Assistant           Medical, surgical and family histories reviewed and updated 2/15/2022.       OBJECTIVE: See Ophthalmology exam    ASSESSMENT:    ICD-10-CM    1. Hyperopia, bilateral  H52.03 EYE EXAM (SIMPLE-NONBILLABLE)     REFRACTION   2. Presbyopia  H52.4 EYE EXAM (SIMPLE-NONBILLABLE)     REFRACTION   3. Dry eyes  H04.123       PLAN:     Patient Instructions   Fill glasses prescription  Continue use of nonpreserved artificial tear in vials as needed   Allow 2 weeks to adapt to change in glasses  Return in 1 year for eye exam, return to clinic in 3-6 months for contact lenses fit if desired or wait until next eye exam     Meera Roach O.D.  [facility]  [address] [place] [address] [phone]           Again, thank you for allowing me to participate in the care of your patient.        Sincerely,        Meera Roach, OD

## 2022-02-15 NOTE — PATIENT INSTRUCTIONS
Fill glasses prescription  Continue use of nonpreserved artificial tear in vials as needed   Allow 2 weeks to adapt to change in glasses  Return in 1 year for eye exam, return to clinic in 3-6 months for contact lenses fit if desired or wait until next eye exam     Meera Roach O.D.  [facility]  [address] [place] [address] [phone]

## 2022-04-29 DIAGNOSIS — G43.109 MIGRAINE WITH AURA AND WITHOUT STATUS MIGRAINOSUS, NOT INTRACTABLE: ICD-10-CM

## 2022-04-29 RX ORDER — RIZATRIPTAN BENZOATE 10 MG/1
TABLET ORAL
Qty: 18 TABLET | Refills: 3 | Status: SHIPPED | OUTPATIENT
Start: 2022-04-29 | End: 2023-07-14

## 2022-05-06 ENCOUNTER — NURSE TRIAGE (OUTPATIENT)
Dept: FAMILY MEDICINE | Facility: CLINIC | Age: 57
End: 2022-05-06
Payer: COMMERCIAL

## 2022-05-06 NOTE — TELEPHONE ENCOUNTER
"  Reason for Disposition    Tick bite with no complications    Additional Information    Negative: Not a tick bite    Negative: Patient sounds very sick or weak to the triager    Negative: Fever or severe headache occurs, 2 to 14 days following the bite    Negative: Widespread rash occurs, 2 to 14 days following the bite    Negative: Can't remove live tick (after using Care Advice)    Negative: Can't remove tick's head that was broken off in the skin (after using Care Advice)    Negative: Fever and area is red    Negative: Fever and area is very tender to touch    Negative: Red streak or red line and length > 2 inches (5 cm)    Negative: Red ring or bull's-eye rash occurs around a deer tick bite    Negative: Probable deer tick that was attached > 36 hours (or tick appears swollen, not flat)    Negative: Patient wants to be seen    Answer Assessment - Initial Assessment Questions  1. TYPE of TICK: \"Is it a wood tick or a deer tick?\" If unsure, ask: \"What size was the tick?\" \"Did it look more like a watermelon seed or a poppy seed?\"       Deer tick    2. LOCATION: \"Where is the tick bite located?\"       On back of arm. Pt states there is some redness at the site where tick was just removed, and will continue to monitor for any spreading redness or bullseye appearance.    3. ONSET: \"How long do you think the tick was attached before you removed it?\" (Hours or days)       Could have gotten the tick from dog or last night while working outside in leaves. Likely attached < 24 hours.    4. TETANUS: \"When was the last tetanus booster?\"       Pt unsure    Protocols used: TICK BITE-A-OH    "

## 2022-06-05 ENCOUNTER — HEALTH MAINTENANCE LETTER (OUTPATIENT)
Age: 57
End: 2022-06-05

## 2022-06-26 ENCOUNTER — NURSE TRIAGE (OUTPATIENT)
Dept: NURSING | Facility: CLINIC | Age: 57
End: 2022-06-26

## 2022-06-26 NOTE — TELEPHONE ENCOUNTER
COVID 19 Nurse Triage Plan/Patient Instructions    Please be aware that novel coronavirus (COVID-19) may be circulating in the community. If you develop symptoms such as fever, cough, or SOB or if you have concerns about the presence of another infection including coronavirus (COVID-19), please contact your health care provider or visit [email or web address].     Disposition/Instructions    Home care recommended. Follow home care protocol based instructions.    Thank you for taking steps to prevent the spread of this virus.  o Limit your contact with others.  o Wear a simple mask to cover your cough.  o Wash your hands well and often.    Resources    [facility]: About COVID-19: [email or web address].[email or web address]/    CDC: What to Do If You're Sick: www.cdc.gov/coronavirus/2019-ncov/about/steps-when-sick.html    CDC: Ending Home Isolation: www.cdc.gov/coronavirus/2019-ncov/hcp/disposition-in-home-patients.html     CDC: Caring for Someone: www.cdc.gov/coronavirus/2019-ncov/if-you-are-sick/care-for-someone.html     [facility]: Interim Guidance for Hospital Discharge to Home: www.health.[facility].[place]/diseases/coronavirus/hcp/hospdischarge.pdf    [facility] clinical trials (COVID-19 research studies): clinicalaffairs.[facility]-clinical-trials     Below are the COVID-19 hotlines at the [place] Department of Health ([facility]). Interpreters are available.   o For health questions: Call 330-880-7658 or 1-479.804.2888 (7 a.m. to 7 p.m.)  o For questions about schools and childcare: Call 119-770-7897 or 1-960.512.3410 (7 a.m. to 7 p.m.)                 Pt is calling.    Pt woke up today with a spot on the back of her right thigh that is red. Itches, and it is warm. Unsure if it is a bug bite. It is not painful.  Area is 2 inches long and 1 inch wide. The area is irritated. Flat and red. No discharge and no bite william seen.  No swelling of that leg.  No history of blood clots. She is not on any hormones.   She  denies chest pain, chest tightness, shortness breath. No pain or discomfort in that thigh, only itching.    Care advice reviewed. When to call back reviewed her care advice.  I advised her that if she has any chest pain, chest tightness, shortness of breath, pain in that thigh, the redness spreads or does not resolve in 2-3 days, then call back immediately.   She verbalized understanding and agreed to follow care advice given.      Alexandra Martinez RN  [facility] Nurse Advisor  6/26/2022 at 6:37 PM          COVID 19 Nurse Triage Plan/Patient Instructions    Please be aware that novel coronavirus (COVID-19) may be circulating in the community. If you develop symptoms such as fever, cough, or SOB or if you have concerns about the presence of another infection including coronavirus (COVID-19), please contact your health care provider or visit [email or web address].     Disposition/Instructions    Home care recommended. Follow home care protocol based instructions.    Thank you for taking steps to prevent the spread of this virus.  o Limit your contact with others.  o Wear a simple mask to cover your cough.  o Wash your hands well and often.    Resources    [facility]: About COVID-19: www.[email or web address]/covid19/    CDC: What to Do If You're Sick: www.cdc.gov/coronavirus/2019-ncov/about/steps-when-sick.html    CDC: Ending Home Isolation: www.cdc.gov/coronavirus/2019-ncov/hcp/disposition-in-home-patients.html     CDC: Caring for Someone: www.cdc.gov/coronavirus/2019-ncov/if-you-are-sick/care-for-someone.html     [facility]: Interim Guidance for Hospital Discharge to Home: www.health.Counts include 234 beds at the [facility].[place]/diseases/coronavirus/hcp/hospdischarge.pdf    [facility] clinical trials (COVID-19 research studies): clinicalaffairs.[facility]/umn-clinical-trials     Below are the COVID-19 hotlines at the [place] Department of Health ([facility]). Interpreters are available.   o For health questions: Call 296-239-7067 or  1-490.381.9109 (7 a.m. to 7 p.m.)  o For questions about schools and childcare: Call 529-659-6231 or 1-114.764.4489 (7 a.m. to 7 p.m.)     Reason for Disposition    Mild localized rash    Additional Information    Negative: [1] Sudden onset of rash (within last 2 hours) AND [2] difficulty with breathing or swallowing    Negative: Sounds like a life-threatening emergency to the triager    Negative: Poison ivy, oak, or sumac contact suspected    Negative: Insect bite(s) suspected    Negative: Ringworm suspected (i.e., round pink patch, sometimes looks like ring, usually 1/2 to 1 inch [12-25 mm],  in size, slowly increasing in size)    Negative: Athlete's Foot suspected (i.e., itchy rash between the toes)    Negative: Jock Itch suspected (i.e., itchy rash on inner thighs near genital area)    Negative: Wound infection suspected (i.e., pain, spreading redness, or pus; in a cut, puncture, scrape or sutured wound)    Negative: Impetigo suspected  (i.e., painless infected superficial small sores, less than 1 inch or 2.5 cm, often covered by a soft, yellow-brown scab or crust; sometimes occurring near nasal openings)    Negative: Shingles suspected (i.e., painful rash, multiple small blisters grouped together in one area of body; dermatomal distribution)    Negative: Rash of external female genital area (vulva)    Negative: Rash of penis or scrotum    Negative: Small spot, skin growth, or mole    Negative: Sores or skin ulcer, not a rash    Negative: Localized lump (or swelling) without redness or rash    Negative: [1] Localized purple or blood-colored spots or dots AND [2] not from injury or friction AND [3] fever    Negative: Patient sounds very sick or weak to the triager    Negative: [1] Red area or streak AND [2] fever    Negative: [1] Rash is painful to touch AND [2] fever    Negative: [1] Looks infected (spreading redness, pus) AND [2] large red area (> 2 in. or 5 cm)    Negative: [1] Looks infected (spreading  redness, pus) AND [2] diabetes mellitus or weak immune system (e.g., HIV positive, cancer chemo, splenectomy, organ transplant, chronic steroids)    Negative: [1] Localized purple or blood-colored spots or dots AND [2] not from injury or friction AND [3] no fever    Negative: [1] Looks infected (spreading redness, pus) AND [2] no fever    Negative: Looks like a boil, infected sore, deep ulcer or other infected rash    Negative: [1] Localized rash is very painful AND [2] no fever    Negative: Genital area rash    Negative: Lyme disease suspected (e.g., bull's eye rash or tick bite / exposure)    Negative: [1] Applying cream or ointment AND [2] causes severe itch, burning or pain    Negative: [1] Pimples (localized) AND [2 ] no improvement after using CARE ADVICE    Negative: Tender bumps in armpits    Negative: [1] Severe localized itching AND [2] after 2 days of steroid cream    Negative: Localized rash present > 7 days    Negative: Red, moist, irritated area between skin folds (or under larger breasts)    Negative: [1] Localized area of skin darkening or thickening on lower legs or ankles AND [2] has NOT been evaluated by a doctor (or NP/PA)    Protocols used: RASH OR REDNESS - [id number]

## 2022-08-08 ENCOUNTER — E-VISIT (OUTPATIENT)
Dept: FAMILY MEDICINE | Facility: CLINIC | Age: 57
End: 2022-08-08
Payer: COMMERCIAL
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
## 2022-08-08 PROCEDURE — 99421 OL DIG E/M SVC 5-10 MIN: CPT | Performed by: PHYSICIAN ASSISTANT

## 2022-08-08 NOTE — PATIENT INSTRUCTIONS
Thank you for choosing us for your care. Given your symptoms, I would like you to do a lab-only visit to determine what is causing them.  I have placed the orders.  Please schedule an appointment with the lab right here in [facility], or call [phone].  I will let you know when the results are back and next steps to take.

## 2022-08-09 ENCOUNTER — LAB (OUTPATIENT)
Dept: LAB | Facility: OTHER | Age: 57
End: 2022-08-09
Payer: COMMERCIAL
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
## 2022-08-09 LAB — B BURGDOR IGG+IGM SER QL: 0.05

## 2022-08-09 PROCEDURE — 36415 COLL VENOUS BLD VENIPUNCTURE: CPT

## 2022-08-09 PROCEDURE — 86618 LYME DISEASE ANTIBODY: CPT

## 2022-08-18 ENCOUNTER — ANCILLARY PROCEDURE (OUTPATIENT)
Dept: MAMMOGRAPHY | Facility: OTHER | Age: 57
End: 2022-08-18
Attending: PHYSICIAN ASSISTANT
Payer: COMMERCIAL

## 2022-08-18 DIAGNOSIS — Z12.31 VISIT FOR SCREENING MAMMOGRAM: ICD-10-CM

## 2022-08-18 PROCEDURE — 77063 BREAST TOMOSYNTHESIS BI: CPT | Mod: TC | Performed by: RADIOLOGY

## 2022-08-18 PROCEDURE — 77067 SCR MAMMO BI INCL CAD: CPT | Mod: TC | Performed by: RADIOLOGY

## 2022-09-12 ENCOUNTER — MYC MEDICAL ADVICE (OUTPATIENT)
Dept: FAMILY MEDICINE | Facility: CLINIC | Age: 57
End: 2022-09-12

## 2022-09-12 ENCOUNTER — OFFICE VISIT (OUTPATIENT)
Dept: FAMILY MEDICINE | Facility: CLINIC | Age: 57
End: 2022-09-12
Payer: COMMERCIAL

## 2022-09-12 VITALS
BODY MASS INDEX: 21.9 KG/M2 | TEMPERATURE: 97.5 F | DIASTOLIC BLOOD PRESSURE: 89 MMHG | WEIGHT: 153 LBS | OXYGEN SATURATION: 100 % | SYSTOLIC BLOOD PRESSURE: 132 MMHG | HEART RATE: 82 BPM | HEIGHT: 70 IN

## 2022-09-12 DIAGNOSIS — E83.119 HEMOCHROMATOSIS, UNSPECIFIED HEMOCHROMATOSIS TYPE: ICD-10-CM

## 2022-09-12 DIAGNOSIS — Z00.00 ROUTINE GENERAL MEDICAL EXAMINATION AT A HEALTH CARE FACILITY: Primary | ICD-10-CM

## 2022-09-12 DIAGNOSIS — R53.83 FATIGUE, UNSPECIFIED TYPE: Primary | ICD-10-CM

## 2022-09-12 DIAGNOSIS — R53.83 FATIGUE, UNSPECIFIED TYPE: ICD-10-CM

## 2022-09-12 DIAGNOSIS — G43.109 MIGRAINE WITH AURA AND WITHOUT STATUS MIGRAINOSUS, NOT INTRACTABLE: ICD-10-CM

## 2022-09-12 DIAGNOSIS — Z12.4 CERVICAL CANCER SCREENING: ICD-10-CM

## 2022-09-12 LAB
BASOPHILS # BLD AUTO: 0 10E3/UL (ref 0–0.2)
BASOPHILS NFR BLD AUTO: 1 %
EOSINOPHIL # BLD AUTO: 0.1 10E3/UL (ref 0–0.7)
EOSINOPHIL NFR BLD AUTO: 2 %
ERYTHROCYTE [DISTWIDTH] IN BLOOD BY AUTOMATED COUNT: 12.2 % (ref 10–15)
HCT VFR BLD AUTO: 42.8 % (ref 35–47)
HGB BLD-MCNC: 14.4 G/DL (ref 11.7–15.7)
LYMPHOCYTES # BLD AUTO: 1.2 10E3/UL (ref 0.8–5.3)
LYMPHOCYTES NFR BLD AUTO: 24 %
MCH RBC QN AUTO: 33.3 PG (ref 26.5–33)
MCHC RBC AUTO-ENTMCNC: 33.6 G/DL (ref 31.5–36.5)
MCV RBC AUTO: 99 FL (ref 78–100)
MONOCYTES # BLD AUTO: 0.4 10E3/UL (ref 0–1.3)
MONOCYTES NFR BLD AUTO: 8 %
NEUTROPHILS # BLD AUTO: 3.2 10E3/UL (ref 1.6–8.3)
NEUTROPHILS NFR BLD AUTO: 66 %
PLATELET # BLD AUTO: 192 10E3/UL (ref 150–450)
RBC # BLD AUTO: 4.33 10E6/UL (ref 3.8–5.2)
WBC # BLD AUTO: 4.9 10E3/UL (ref 4–11)

## 2022-09-12 PROCEDURE — 80050 GENERAL HEALTH PANEL: CPT | Performed by: PHYSICIAN ASSISTANT

## 2022-09-12 PROCEDURE — 83550 IRON BINDING TEST: CPT | Performed by: PHYSICIAN ASSISTANT

## 2022-09-12 PROCEDURE — 99396 PREV VISIT EST AGE 40-64: CPT | Mod: 25 | Performed by: PHYSICIAN ASSISTANT

## 2022-09-12 PROCEDURE — 90682 RIV4 VACC RECOMBINANT DNA IM: CPT | Performed by: PHYSICIAN ASSISTANT

## 2022-09-12 PROCEDURE — 99213 OFFICE O/P EST LOW 20 MIN: CPT | Mod: 25 | Performed by: PHYSICIAN ASSISTANT

## 2022-09-12 PROCEDURE — 90471 IMMUNIZATION ADMIN: CPT | Performed by: PHYSICIAN ASSISTANT

## 2022-09-12 PROCEDURE — 87624 HPV HI-RISK TYP POOLED RSLT: CPT | Performed by: PHYSICIAN ASSISTANT

## 2022-09-12 PROCEDURE — G0145 SCR C/V CYTO,THINLAYER,RESCR: HCPCS | Performed by: PHYSICIAN ASSISTANT

## 2022-09-12 PROCEDURE — 36415 COLL VENOUS BLD VENIPUNCTURE: CPT | Performed by: PHYSICIAN ASSISTANT

## 2022-09-12 PROCEDURE — 82728 ASSAY OF FERRITIN: CPT | Performed by: PHYSICIAN ASSISTANT

## 2022-09-12 ASSESSMENT — PAIN SCALES - GENERAL: PAINLEVEL: NO PAIN (0)

## 2022-09-12 ASSESSMENT — ENCOUNTER SYMPTOMS
BREAST MASS: 0
CHILLS: 0
CONSTIPATION: 0
FREQUENCY: 0
MYALGIAS: 0
NAUSEA: 0
SORE THROAT: 0
PALPITATIONS: 0
JOINT SWELLING: 0
EYE PAIN: 0
ABDOMINAL PAIN: 0
FEVER: 0
NERVOUS/ANXIOUS: 0
COUGH: 0
HEADACHES: 0
DIARRHEA: 0
DYSURIA: 0
WEAKNESS: 0
DIZZINESS: 0
HEMATURIA: 0
ARTHRALGIAS: 0
HEMATOCHEZIA: 0
HEARTBURN: 0
PARESTHESIAS: 0
SHORTNESS OF BREATH: 0

## 2022-09-12 NOTE — NURSING NOTE
"Chief Complaint   Patient presents with     Physical       Initial /89   Pulse 82   Temp 97.5  F (36.4  C) (Tympanic)   Ht 1.765 m (5' 9.5\")   Wt 69.4 kg (153 lb)   LMP  (LMP Unknown)   SpO2 100%   BMI 22.27 kg/m   Estimated body mass index is 22.27 kg/m  as calculated from the following:    Height as of this encounter: 1.765 m (5' 9.5\").    Weight as of this encounter: 69.4 kg (153 lb).  Medication Reconciliation: complete    SOFIA Diaz MA    "

## 2022-09-12 NOTE — PROGRESS NOTES
SUBJECTIVE:   CC: Emily Fritz is an 57 year old woman who presents for preventive health visit.       Patient has been advised of split billing requirements and indicates understanding: Yes  Healthy Habits:     Getting at least 3 servings of Calcium per day:  Yes    Bi-annual eye exam:  Yes    Dental care twice a year:  Yes    Sleep apnea or symptoms of sleep apnea:  None    Diet:  Regular (no restrictions)    Frequency of exercise:  2-3 days/week    Duration of exercise:  15-30 minutes    Taking medications regularly:  Yes    Medication side effects:  Not applicable    PHQ-2 Total Score: 1    Additional concerns today:  Yes      PROBLEMS TO ADD:  1 Hemochromatosis: she has not had an iron level checked since last year.       Today's PHQ-2 Score:   PHQ-2 (  [employer]) 2022   Q1: Little interest or pleasure in doing things 0   Q2: Feeling down, depressed or hopeless 1   PHQ-2 Score 1   PHQ-2 Total Score (12-17 Years)- Positive if 3 or more points; Administer PHQ-A if positive -   Q1: Little interest or pleasure in doing things Not at all   Q2: Feeling down, depressed or hopeless Several days   PHQ-2 Score 1       Abuse: Current or Past (Physical, Sexual or Emotional) - No  Do you feel safe in your environment? Yes    Have you ever done Advance Care Planning? (For example, a Health Directive, POLST, or a discussion with a medical provider or your loved ones about your wishes): No, advance care planning information given to patient to review.  Patient declined advance care planning discussion at this time.    Social History     Tobacco Use     Smoking status: Former Smoker     Packs/day: 0.00     Years: 10.00     Pack years: 0.00     Quit date: 1995     Years since quittin.7     Smokeless tobacco: Never Used     Tobacco comment: lives in smoke free household   Substance Use Topics     Alcohol use: Yes     If you drink alcohol do you typically have >3 drinks per day or >7 drinks per week?  No    Alcohol Use 2022   Prescreen: >3 drinks/day or >7 drinks/week? No   Prescreen: >3 drinks/day or >7 drinks/week? -   No flowsheet data found.    Reviewed orders with patient.  Reviewed health maintenance and updated orders accordingly - Yes  BP Readings from Last 3 Encounters:   22 132/89   21 132/82   10/13/21 128/88    Wt Readings from Last 3 Encounters:   22 69.4 kg (153 lb)   21 69.4 kg (153 lb)   10/13/21 68.9 kg (152 lb)                  Patient Active Problem List   Diagnosis     CARDIOVASCULAR SCREENING; LDL GOAL LESS THAN 160     Cholelithiasis     Diagnostic skin and sensitization tests     Allergic rhinitis due to animal dander     Rhinitis, allergic to other allergen     House dust mite allergy     Lateral epicondylitis     Vitamin D deficiency disease     Heart palpitations     Family history of colonic polyps     Family history of breast cancer in mother     Gastroesophageal reflux disease without esophagitis     Menorrhagia with regular cycle     Dry eyes     Migraine with aura and without status migrainosus, not intractable     Past Surgical History:   Procedure Laterality Date     C/SECTION, LOW TRANSVERSE       CHOLECYSTECTOMY       COLONOSCOPY N/A 2021    Procedure: COLONOSCOPY, WITH POLYPECTOMY AND BIOPSY;  Surgeon: Sonia Brenner MD;  Location: [facility] OR     DILATION AND CURETTAGE, HYSTEROSCOPY DIAGNOSTIC, COMBINED  16    Menometrorrhagia       Social History     Tobacco Use     Smoking status: Former Smoker     Packs/day: 0.00     Years: 10.00     Pack years: 0.00     Quit date: 1995     Years since quittin.7     Smokeless tobacco: Never Used     Tobacco comment: lives in smoke free household   Substance Use Topics     Alcohol use: Yes     Family History   Problem Relation Age of Onset     Diabetes Mother 78     Hypertension Mother      Thyroid Disease Mother      Cancer Mother 78        breast-mastectomy     Circulatory Mother          DVT     Breast Cancer Mother 79     Hypertension Father      Cerebrovascular Disease Father      Heart Disease Father         CHF     Hypertension Sister      Other Cancer Sister         Carcinoid Cancer     Breast Cancer Maternal Aunt         postmenopausal     Macular Degeneration Paternal Aunt      Breast Cancer Maternal Aunt         postmenopausal     Gastrointestinal Disease Sister         gastric carcinoid tumors with mets to liver     Depression Brother         Suicide 7/8/2020     Glaucoma No family hx of          Current Outpatient Medications   Medication Sig Dispense Refill     [facility] PURELAX powder   6     rizatriptan (MAXALT) 10 MG tablet TAKE 1 TABLET BY MOUTH AT ONSET OF HEADACHE FOR MIGRAINE. REPEAT IN 2 HOURS IF NEEDED. MAX 3 TABLETS/24 HOURS. 18 tablet 3     Allergies   Allergen Reactions     Nkda [No Known Drug Allergies]      Recent Labs   Lab Test 10/13/21  1014 05/14/19  0720 09/18/18  0740 11/17/14  1626   LDL  --  78  --   --    HDL  --  70  --   --    TRIG  --  48  --   --    ALT 23  --  21 29   CR 0.85  --   --  0.70   GFRESTIMATED 77  --   --  88   GFRESTBLACK  --   --   --  >90  African American GFR Calc     POTASSIUM 4.0  --   --  4.2        Breast Cancer Screening:    FHS-7:   Breast CA Risk Assessment (FHS-7) 8/18/2022 9/12/2022   Did any of your first-degree relatives have breast or ovarian cancer? Yes Yes   Did any of your relatives have bilateral breast cancer? No Unknown   Did any man in your family have breast cancer? No -   Did any woman in your family have breast and ovarian cancer? No -   Did any woman in your family have breast cancer before age 50 y? No -   Do you have 2 or more relatives with breast and/or ovarian cancer? No -   Do you have 2 or more relatives with breast and/or bowel cancer? No -       Mammogram Screening: Recommended mammography every 1-2 years with patient discussion and risk factor consideration  Pertinent mammograms are reviewed under the imaging  tab.    History of abnormal Pap smear:   NO - age 30-65 PAP every 5 years with negative HPV co-testing recommended  Last 3 Pap and HPV Results:   PAP / HPV Latest Ref Rng & Units 2017 12/10/2014 2011   PAP (Historical) - NIL NIL NIL   HPV16 NEG:Negative Negative - -   HPV18 NEG:Negative Negative - -   HRHPV NEG:Negative Negative - -     PAP / HPV Latest Ref Rng & Units 2017 12/10/2014 2011   PAP (Historical) - NIL NIL NIL   HPV16 NEG:Negative Negative - -   HPV18 NEG:Negative Negative - -   HRHPV NEG:Negative Negative - -     Reviewed and updated as needed this visit by clinical staff   Tobacco     Med Hx  Surg Hx  Fam Hx  Soc Hx          Reviewed and updated as needed this visit by Provider                   Past Medical History:   Diagnosis Date     Allergic rhinitis due to animal dander      Chronic constipation      Diagnostic skin and sensitization tests 12 skin tests pos. for: dog(+)/DM/CR     Family history of breast cancer in mother 12/10/2014     Family history of breast cancer in mother      Family history of colonic polyps 12/10/2014     Family history of colonic polyps 12/10/2014     Family history of colonic polyps      Heart palpitations 3/2009    [place]     House dust mite allergy      Lateral epicondylitis 3/28/2012     Rhinitis, allergic to other allergen       Past Surgical History:   Procedure Laterality Date     C/SECTION, LOW TRANSVERSE  1988     CHOLECYSTECTOMY       COLONOSCOPY N/A 2021    Procedure: COLONOSCOPY, WITH POLYPECTOMY AND BIOPSY;  Surgeon: Sonia Brenner MD;  Location: [facility] OR     DILATION AND CURETTAGE, HYSTEROSCOPY DIAGNOSTIC, COMBINED  16    Menometrorrhagia     OB History    Para Term  AB Living   3 3 3 0 0 3   SAB IAB Ectopic Multiple Live Births   0 0 0 0 3      # Outcome Date GA Lbr Brannon/2nd Weight Sex Delivery Anes PTL Lv   3 Term 93 40w0d  3.402 kg (7 lb 8 oz) M    EMMA      Name: Deion   2 Term 92  "40w0d  3.572 kg (7 lb 14 oz) M    EMMA      Name: Roc Anna Term 88 40w0d  3.345 kg (7 lb 6 oz) F -SEC   EMMA      Name: Tammy       Review of Systems   Constitutional: Negative for chills and fever.   HENT: Negative for congestion, ear pain, hearing loss and sore throat.    Eyes: Negative for pain and visual disturbance.   Respiratory: Negative for cough and shortness of breath.    Cardiovascular: Negative for chest pain, palpitations and peripheral edema.   Gastrointestinal: Negative for abdominal pain, constipation, diarrhea, heartburn, hematochezia and nausea.   Breasts:  Negative for tenderness, breast mass and discharge.   Genitourinary: Negative for dysuria, frequency, genital sores, hematuria, pelvic pain, urgency, vaginal bleeding and vaginal discharge.   Musculoskeletal: Negative for arthralgias, joint swelling and myalgias.   Skin: Negative for rash.   Neurological: Negative for dizziness, weakness, headaches and paresthesias.   Psychiatric/Behavioral: Negative for mood changes. The patient is not nervous/anxious.           OBJECTIVE:   /89   Pulse 82   Temp 97.5  F (36.4  C) (Tympanic)   Ht 1.765 m (5' 9.5\")   Wt 69.4 kg (153 lb)   LMP  (LMP Unknown)   SpO2 100%   BMI 22.27 kg/m    Physical Exam  GENERAL APPEARANCE: healthy, alert and no distress  EYES: Eyes grossly normal to inspection, PERRL and conjunctivae and sclerae normal  HENT: ear canals and TM's normal, nose and mouth without ulcers or lesions, oropharynx clear and oral mucous membranes moist  NECK: no adenopathy, no asymmetry, masses, or scars and thyroid normal to palpation  RESP: lungs clear to auscultation - no rales, rhonchi or wheezes  BREAST: normal without masses, tenderness or nipple discharge and no palpable axillary masses or adenopathy  CV: regular rate and rhythm, normal S1 S2, no S3 or S4, no murmur, click or rub, no peripheral edema and peripheral pulses strong  ABDOMEN: soft, nontender, no " "hepatosplenomegaly, no masses and bowel sounds normal   (female): normal female external genitalia, normal urethral meatus, vaginal mucosal atrophy noted, normal cervix, adnexae, and uterus without masses or abnormal discharge  MS: no musculoskeletal defects are noted and gait is age appropriate without ataxia  SKIN: no suspicious lesions or rashes  NEURO: Normal strength and tone, sensory exam grossly normal, mentation intact and speech normal  PSYCH: mentation appears normal and affect normal/bright    Diagnostic Test Results:  Labs reviewed in Epic    ASSESSMENT/PLAN:   (Z00.00) Routine general medical examination at a health care facility  (primary encounter diagnosis)  Comment: Health maintenance reviewed and updated.  She plans to have her cholesterol checked when she is fasting and was scheduled for an appointment      (E83.119) Hemochromatosis, unspecified hemochromatosis type  Comment: check yearly tests  Plan: CBC with platelets and differential, Ferritin,         Comprehensive metabolic panel (BMP + Alb, Alk         Phos, ALT, AST, Total. Bili, TP)            (Z12.4) Cervical cancer screening  Plan: Pap Screen with HPV - recommended age 30 - 65         years        (G43.109) Migraines  well controlled with the maxalt, she does not need refills today.           Patient has been advised of split billing requirements and indicates understanding: Yes    COUNSELING:  Reviewed preventive health counseling, as reflected in patient instructions       Regular exercise       Healthy diet/nutrition    Estimated body mass index is 22.27 kg/m  as calculated from the following:    Height as of this encounter: 1.765 m (5' 9.5\").    Weight as of this encounter: 69.4 kg (153 lb).        She reports that she quit smoking about 27 years ago. She smoked 0.00 packs per day for 10.00 years. She has never used smokeless tobacco.      Counseling Resources:  ATP IV Guidelines  Pooled Cohorts Equation Calculator  Breast Cancer " Risk Calculator  BRCA-Related Cancer Risk Assessment: FHS-7 Tool  FRAX Risk Assessment  ICSI Preventive Guidelines  Dietary Guidelines for Americans, 2010  USDA's MyPlate  ASA Prophylaxis  Lung CA Screening    Kristen M. Kehr, PA-C [facility]

## 2022-09-13 LAB
ALBUMIN SERPL-MCNC: 3.8 G/DL (ref 3.4–5)
ALP SERPL-CCNC: 103 U/L (ref 40–150)
ALT SERPL W P-5'-P-CCNC: 21 U/L (ref 0–50)
ANION GAP SERPL CALCULATED.3IONS-SCNC: 3 MMOL/L (ref 3–14)
AST SERPL W P-5'-P-CCNC: 23 U/L (ref 0–45)
BILIRUB SERPL-MCNC: 0.4 MG/DL (ref 0.2–1.3)
BUN SERPL-MCNC: 16 MG/DL (ref 7–30)
CALCIUM SERPL-MCNC: 9 MG/DL (ref 8.5–10.1)
CHLORIDE BLD-SCNC: 108 MMOL/L (ref 94–109)
CO2 SERPL-SCNC: 31 MMOL/L (ref 20–32)
CREAT SERPL-MCNC: 0.76 MG/DL (ref 0.52–1.04)
FERRITIN SERPL-MCNC: 123 NG/ML (ref 8–252)
GFR SERPL CREATININE-BSD FRML MDRD: >90 ML/MIN/1.73M2
GLUCOSE BLD-MCNC: 130 MG/DL (ref 70–99)
POTASSIUM BLD-SCNC: 3.9 MMOL/L (ref 3.4–5.3)
PROT SERPL-MCNC: 7 G/DL (ref 6.8–8.8)
SODIUM SERPL-SCNC: 142 MMOL/L (ref 133–144)
TSH SERPL DL<=0.005 MIU/L-ACNC: 2.6 MU/L (ref 0.4–4)

## 2022-09-13 NOTE — TELEPHONE ENCOUNTER
Please contact lab to add thyroid testing on to blood collected yesterday.   I have the test ordered.   Thank you   Kristen Kehr PA-C

## 2022-09-14 LAB
BKR LAB AP GYN ADEQUACY: NORMAL
BKR LAB AP GYN INTERPRETATION: NORMAL
BKR LAB AP HPV REFLEX: NORMAL
BKR LAB AP PREVIOUS ABNORMAL: NORMAL
PATH REPORT.COMMENTS IMP SPEC: NORMAL
PATH REPORT.COMMENTS IMP SPEC: NORMAL
PATH REPORT.RELEVANT HX SPEC: NORMAL

## 2022-09-15 LAB
HUMAN PAPILLOMA VIRUS 16 DNA: NEGATIVE
HUMAN PAPILLOMA VIRUS 18 DNA: NEGATIVE
HUMAN PAPILLOMA VIRUS FINAL DIAGNOSIS: NORMAL
HUMAN PAPILLOMA VIRUS OTHER HR: NEGATIVE
IRON SATN MFR SERPL: 61 % (ref 15–46)
IRON SERPL-MCNC: 158 UG/DL (ref 35–180)
TIBC SERPL-MCNC: 258 UG/DL (ref 240–430)

## 2022-09-28 ENCOUNTER — LAB (OUTPATIENT)
Dept: LAB | Facility: CLINIC | Age: 57
End: 2022-09-28
Payer: COMMERCIAL

## 2022-09-28 DIAGNOSIS — R39.9 SYMPTOMS OF URINARY TRACT INFECTION: ICD-10-CM

## 2022-09-28 DIAGNOSIS — Z00.00 ROUTINE GENERAL MEDICAL EXAMINATION AT A HEALTH CARE FACILITY: ICD-10-CM

## 2022-09-28 LAB
CHOLEST SERPL-MCNC: 215 MG/DL
FASTING STATUS PATIENT QL REPORTED: YES
FASTING STATUS PATIENT QL REPORTED: YES
GLUCOSE BLD-MCNC: 95 MG/DL (ref 70–99)
HDLC SERPL-MCNC: 75 MG/DL
LDLC SERPL CALC-MCNC: 129 MG/DL
NONHDLC SERPL-MCNC: 140 MG/DL
TRIGL SERPL-MCNC: 53 MG/DL

## 2022-09-28 PROCEDURE — 80061 LIPID PANEL: CPT

## 2022-09-28 PROCEDURE — 82947 ASSAY GLUCOSE BLOOD QUANT: CPT

## 2022-09-28 PROCEDURE — 87086 URINE CULTURE/COLONY COUNT: CPT

## 2022-09-28 PROCEDURE — 36415 COLL VENOUS BLD VENIPUNCTURE: CPT

## 2022-09-29 LAB — BACTERIA UR CULT: NO GROWTH

## 2022-11-16 ENCOUNTER — IMMUNIZATION (OUTPATIENT)
Dept: FAMILY MEDICINE | Facility: CLINIC | Age: 57
End: 2022-11-16
Payer: COMMERCIAL

## 2022-11-16 DIAGNOSIS — Z23 HIGH PRIORITY FOR 2019-NCOV VACCINE: Primary | ICD-10-CM

## 2022-11-16 PROCEDURE — 0134A COVID-19,PF,MODERNA BIVALENT: CPT

## 2022-11-16 PROCEDURE — 91313 COVID-19,PF,MODERNA BIVALENT: CPT

## 2022-12-15 ENCOUNTER — OFFICE VISIT (OUTPATIENT)
Dept: FAMILY MEDICINE | Facility: CLINIC | Age: 57
End: 2022-12-15
Payer: COMMERCIAL

## 2022-12-15 VITALS
OXYGEN SATURATION: 100 % | TEMPERATURE: 97.8 F | WEIGHT: 154 LBS | BODY MASS INDEX: 22.05 KG/M2 | HEIGHT: 70 IN | DIASTOLIC BLOOD PRESSURE: 80 MMHG | SYSTOLIC BLOOD PRESSURE: 135 MMHG | HEART RATE: 87 BPM

## 2022-12-15 DIAGNOSIS — H92.01 OTALGIA, RIGHT: ICD-10-CM

## 2022-12-15 DIAGNOSIS — R10.13 EPIGASTRIC PAIN: Primary | ICD-10-CM

## 2022-12-15 PROCEDURE — 99213 OFFICE O/P EST LOW 20 MIN: CPT | Performed by: NURSE PRACTITIONER

## 2022-12-15 RX ORDER — PANTOPRAZOLE SODIUM 40 MG/1
40 TABLET, DELAYED RELEASE ORAL DAILY
Qty: 90 TABLET | Refills: 0 | Status: SHIPPED | OUTPATIENT
Start: 2022-12-15 | End: 2023-01-26

## 2022-12-15 NOTE — PROGRESS NOTES
Assessment & Plan     Epigastric pain  Chronic. No gallbladder.  Constipation under control.    Stable labs 09/2022.  CT 11/2021 unremarkable.   Known hiatal hernia.  Currently not on PPI.  EGD 2016, gastritis, biopsies normal.   Start Protonix.  Follow-up in 2 weeks if not improving, would consider imaging vs EGD.   - pantoprazole (PROTONIX) 40 MG EC tablet; Take 1 tablet (40 mg) by mouth daily    Otalgia, right  Normal on exam.   Discuss trial of antihistamine and decongestant.   Follow-up if not improving.       Return in about 2 weeks (around 12/29/2022) for If failure to improve.    Jaimee Traore, CNP  [facility] MUNIRA Hunter is a 57 year old, presenting for the following health issues:  Abdominal Pain and Ear Problem      History of Present Illness       Reason for visit:  Upper abdominal pain and ear pressure  Symptom onset:  More than a month  Symptoms include:  Pain under right rib cage which sometimes goes in to back I also have some right ear pressure that I would like checked out  Symptom progression:  Worsening  Had these symptoms before:  Yes  What makes it worse:  Over eating  What makes it better:  Eating lessShe consumes 0 sweetened beverage(s) daily.She exercises with enough effort to increase her heart rate 9 or less minutes per day.  She exercises with enough effort to increase her heart rate 3 or less days per week.   She is taking medications regularly.       Has RUQ pain, present for months. Feels like there is a lump, like food is sitting is upper abdomen and not moving down. Does not have gallbladder.  Hx of hiatal hernia.   Patient reports chronic constipation, takes Miralax once per week which helps.  Has a BM most days.  Feels constipation is under control.    Had unremarkable CMP and CBC in September of 2022, reports was having abdominal symptoms at that time.   Normal CT abd/pelvis 11/3/21.  EGD 2016- gastritis.   She was on omeprazole in the past,  "but no longer taking PPI. Occasionally takes TUMs.      Having some right ear pain and pressure for the last 3 days.        Review of Systems   Constitutional, HEENT, cardiovascular, pulmonary, gi and gu systems are negative, except as otherwise noted.      Objective    /80   Pulse 87   Temp 97.8  F (36.6  C)   Ht 1.765 m (5' 9.5\")   Wt 69.9 kg (154 lb)   LMP  (LMP Unknown)   SpO2 100%   BMI 22.42 kg/m    Body mass index is 22.42 kg/m .  Physical Exam   GENERAL: healthy, alert and no distress  EYES: Eyes grossly normal to inspection, PERRL and conjunctivae and sclerae normal  HENT: ear canals and TM's normal, nose and mouth without ulcers or lesions  RESP: lungs clear to auscultation - no rales, rhonchi or wheezes  CV: regular rate and rhythm, normal S1 S2, no S3 or S4, no murmur, click or rub, no peripheral edema and peripheral pulses strong  ABDOMEN: soft, TTP epigastrium, no hepatosplenomegaly, no masses and bowel sounds normal  MS: no gross musculoskeletal defects noted, no edema  SKIN: no suspicious lesions or rashes    Recent Labs   Lab Test 09/28/22  1000 09/12/22  1115 10/13/21  1014   NA  --  142 141   POTASSIUM  --  3.9 4.0   CHLORIDE  --  108 109   CO2  --  31 28   ANIONGAP  --  3 4   GLC 95 130* 82   BUN  --  16 15   CR  --  0.76 0.85   BETTY  --  9.0 9.1     Liver Function Studies - Recent Labs   Lab Test 09/12/22  1115   PROTTOTAL 7.0   ALBUMIN 3.8   BILITOTAL 0.4   ALKPHOS 103   AST 23   ALT 21     CBC RESULTS: Recent Labs   Lab Test 09/12/22  1115   WBC 4.9   RBC 4.33   HGB 14.4   HCT 42.8   MCV 99   MCH 33.3*   MCHC 33.6   RDW 12.2                  "

## 2022-12-15 NOTE — PATIENT INSTRUCTIONS
Start Protonix 40 mg once daily in the morning on an empty stomach.  If not improving in 2 weeks, please let me know.

## 2023-01-01 ENCOUNTER — MYC MEDICAL ADVICE (OUTPATIENT)
Dept: FAMILY MEDICINE | Facility: CLINIC | Age: 58
End: 2023-01-01

## 2023-01-01 DIAGNOSIS — G89.29 CHRONIC ABDOMINAL PAIN: Primary | ICD-10-CM

## 2023-01-01 DIAGNOSIS — R10.9 CHRONIC ABDOMINAL PAIN: Primary | ICD-10-CM

## 2023-01-02 NOTE — TELEPHONE ENCOUNTER
See Glance message update    Routing to provider to advise. Plan of care from office visit 12/15/22.        Jaimee Hwang RN

## 2023-01-13 ENCOUNTER — ANCILLARY PROCEDURE (OUTPATIENT)
Dept: ULTRASOUND IMAGING | Facility: CLINIC | Age: 58
End: 2023-01-13
Attending: NURSE PRACTITIONER
Payer: COMMERCIAL

## 2023-01-13 DIAGNOSIS — G89.29 CHRONIC ABDOMINAL PAIN: ICD-10-CM

## 2023-01-13 DIAGNOSIS — R10.9 CHRONIC ABDOMINAL PAIN: ICD-10-CM

## 2023-01-13 DIAGNOSIS — G89.29 CHRONIC ABDOMINAL PAIN: Primary | ICD-10-CM

## 2023-01-13 DIAGNOSIS — R10.9 CHRONIC ABDOMINAL PAIN: Primary | ICD-10-CM

## 2023-01-13 PROCEDURE — 76700 US EXAM ABDOM COMPLETE: CPT | Mod: TC | Performed by: RADIOLOGY

## 2023-01-16 ENCOUNTER — MYC MEDICAL ADVICE (OUTPATIENT)
Dept: FAMILY MEDICINE | Facility: CLINIC | Age: 58
End: 2023-01-16

## 2023-01-19 NOTE — TELEPHONE ENCOUNTER
REFERRAL INFORMATION:    Referring Provider:  Jaimee Traore CNP    Referring Clinic:  [facility]    Reason for Visit/Diagnosis: Chronic abdominal pain     FUTURE VISIT INFORMATION:    Appointment Date: 1/26/2023     NOTES STATUS DETAILS   OFFICE NOTE from Referring Provider Internal 12/15/2022 OV with SUZE Traore    OFFICE NOTE from Other Specialist Internal 11/2/2021, 10/13/2021 OV with Kehr, K   HOSPITAL DISCHARGE SUMMARY/  ED VISITS N/A    OPERATIVE REPORT Internal 12/9/2010 LAPAROSCOPIC CHOLECYSTECTOMY    MEDICATION LIST Internal         ENDOSCOPY  Care Everywhere 2/24/2016 [facility]   COLONOSCOPY Internal 7/27/2021 COLONOSCOPY, WITH POLYPECTOMY AND BIOPSY   ERCP N/A    EUS N/A    STOOL TESTING N/A    PERTINENT LABS N/A    PATHOLOGY REPORTS (RELATED) N/A    IMAGING (CT, MRI, EGD, MRCP, Small Bowel Follow Through/SBT, MR/CT Enterography) Internal US:   1/13/2023 Abdomen   CT:   11/3/2021 Abdomen Pelvis

## 2023-01-26 ENCOUNTER — VIRTUAL VISIT (OUTPATIENT)
Dept: GASTROENTEROLOGY | Facility: CLINIC | Age: 58
End: 2023-01-26
Attending: NURSE PRACTITIONER
Payer: COMMERCIAL

## 2023-01-26 ENCOUNTER — PRE VISIT (OUTPATIENT)
Dept: GASTROENTEROLOGY | Facility: CLINIC | Age: 58
End: 2023-01-26

## 2023-01-26 VITALS — BODY MASS INDEX: 21.83 KG/M2 | WEIGHT: 150 LBS

## 2023-01-26 DIAGNOSIS — R10.10 UPPER ABDOMINAL PAIN: Primary | ICD-10-CM

## 2023-01-26 DIAGNOSIS — K59.04 CHRONIC IDIOPATHIC CONSTIPATION: ICD-10-CM

## 2023-01-26 PROCEDURE — 99204 OFFICE O/P NEW MOD 45 MIN: CPT | Mod: GT | Performed by: PHYSICIAN ASSISTANT

## 2023-01-26 ASSESSMENT — PAIN SCALES - GENERAL: PAINLEVEL: NO PAIN (0)

## 2023-01-26 NOTE — PROGRESS NOTES
Elsa is a 57 year old who is being evaluated via a billable video visit.      How would you like to obtain your AVS? MyChart  If the video visit is dropped, the invitation should be resent by: Send to e-mail at: [email or web address]  Will anyone else be joining your video visit? No           GI CLINIC NEW PATIENT VISIT (VIDEO)    CC/REFERRING MD:    Kehr, Kristen M Stephanie Lynn Piche    REASON FOR CONSULTATION:   Referred by Jaimee Traore for Upper abd pain    HISTORY OF PRESENT ILLNESS:    Emily Fritz is 57 year old female who presents for evaluation of an upper abdominal pain.  She was referred by her primary care last month for an intermittent upper abdominal pain underneath her right rib cage with occasional nauseousness.  Symptoms for started several months ago.  She describes it as a strong pain under her right rib cage.  Felt like her insides are twisting. She would have waves of nauseousness with it would only last for a few seconds.  No vomiting.  She was not able to correlate it to her diet or to her bowel movements.  She does have constipation and takes MiraLAX about once a week.  Overall she feels that her bowel movements are going well. She does have a history of hiatal hernia noted on upper endoscopy in 2016.  At that time she also had findings suggesting gastritis however biopsies were negative.  Her primary care tried her on Protonix for her symptoms however this seemed to make things worse so she discontinued.  She did have an abdominal sonogram earlier this month which was unrevealing.  She has been eating smaller meals and feels this helps.  She reports that the pain is mostly resolved at this time.  She almost canceled this appointment because she is feeling so much better.    Elsa  has a past medical history of Allergic rhinitis due to animal dander, Chronic constipation, Diagnostic skin and sensitization tests (2/13/12 skin tests pos. for: dog(+)/DM/CR), Family history of breast  cancer in mother (12/10/2014), Family history of breast cancer in mother, Family history of colonic polyps (12/10/2014), Family history of colonic polyps (12/10/2014), Family history of colonic polyps, Heart palpitations (3/2009), House dust mite allergy, Lateral epicondylitis (3/28/2012), and Rhinitis, allergic to other allergen.    She  has a past surgical history that includes c/section, low transverse (1988); Cholecystectomy (2010); Dilation and curettage, hysteroscopy diagnostic, combined (8/25/16); and Colonoscopy (N/A, 7/27/2021).    She  reports that she quit smoking about 28 years ago. Her smoking use included cigarettes. She has never been exposed to tobacco smoke. She has never used smokeless tobacco. She reports current alcohol use. She reports that she does not use drugs.    Her family history includes Breast Cancer in her maternal aunt and maternal aunt; Breast Cancer (age of onset: 79) in her mother; Cancer (age of onset: 78) in her mother; Cerebrovascular Disease in her father; Circulatory in her mother; Depression in her brother; Diabetes (age of onset: 78) in her mother; Gastrointestinal Disease in her sister; Heart Disease in her father; Hypertension in her father, mother, and sister; Macular Degeneration in her paternal aunt; Other Cancer in her sister; Thyroid Disease in her mother.    ALLERGIES:  Nkda [no known drug allergies]      PREVIOUS ENDOSCOPY:    COLONOSCOPY 7/27/21  Impression:          - One 4 mm polyp at the hepatic flexure, removed with a                        jumbo cold forceps. Resected and retrieved.                        - Congested mucosa at the appendiceal orifice. Biopsied.                        - Diverticulosis in the left colon and in the right                        colon.                        - The examined portion of the ileum was normal.    A. COLON, HEPATIC FLEXURE, POLYP:  Colonic mucosa with lymphoid aggregate; no neoplastic or hyperplastic polyp identified       B. COLON AT APPENDIX ORIFICE, POLYP  Colonic mucosa with lymphoid aggregate; no neoplastic or hyperplastic polyp identified      PERTINENT MEDICATIONS:    Current Outpatient Medications:      [employer] PURELAX powder, , Disp: , Rfl: 6     pantoprazole (PROTONIX) 40 MG EC tablet, Take 1 tablet (40 mg) by mouth daily (Patient not taking: Reported on 1/26/2023), Disp: 90 tablet, Rfl: 0     rizatriptan (MAXALT) 10 MG tablet, TAKE 1 TABLET BY MOUTH AT ONSET OF HEADACHE FOR MIGRAINE. REPEAT IN 2 HOURS IF NEEDED. MAX 3 TABLETS/24 HOURS. (Patient not taking: Reported on 12/15/2022), Disp: 18 tablet, Rfl: 3        PHYSICAL EXAMINATION:  Constitutional: aaox3, cooperative, pleasant, not dyspneic/diaphoretic, no acute distress      GENERAL: Healthy, alert and no distress  EYES: Eyes grossly normal to inspection.  No discharge or erythema, or obvious scleral/conjunctival abnormalities.  RESP: No audible wheeze, cough, or visible cyanosis.  No visible retractions or increased work of breathing.    SKIN: Visible skin clear. No significant rash, abnormal pigmentation or lesions.  NEURO: Cranial nerves grossly intact.  Mentation and speech appropriate for age.  PSYCH: Mentation appears normal, affect normal/bright, judgement and insight intact, normal speech and appearance well-groomed.          PERTINENT STUDIES: (I personally reviewed these laboratory studies today)  Most recent CBC:   Recent Labs   Lab Test 09/12/22  1115 10/13/21  1014   WBC 4.9 4.8   HGB 14.4 14.7   HCT 42.8 43.5    210     Most recent hepatic panel:  Recent Labs   Lab Test 09/12/22  1115 10/13/21  1014   ALT 21 23   AST 23 21     Most recent creatinine:  Recent Labs   Lab Test 09/12/22  1115 10/13/21  1014   CR 0.76 0.85       TSH   Date Value Ref Range Status   09/12/2022 2.60 0.40 - 4.00 mU/L Final   04/05/2013 2.47 0.4 - 5.0 mU/L Final         RADIOLOGY:      Narrative & Impression   US ABDOMEN COMPLETE 1/13/2023 8:38 AM     CLINICAL HISTORY: Chronic  abdominal pain.     TECHNIQUE: Complete abdominal ultrasound.     COMPARISON: None.     FINDINGS:     GALLBLADDER: Surgically absent.     BILE DUCTS: No biliary dilatation. The common duct measures 6 mm.     LIVER: Normal parenchyma with smooth contour. No focal mass. Benign  cyst requiring no follow-up.     RIGHT KIDNEY: Normal size. Normal echogenicity with no hydronephrosis  or mass.      LEFT KIDNEY: Normal size. Normal echogenicity with no hydronephrosis  or mass.      SPLEEN: Normal.     PANCREAS: The visualized portions are normal.     AORTA: Normal in caliber.      IVC: Normal where visualized.     No ascites.                                                                      IMPRESSION: No acute process demonstrated.      BEAN BROWN MD               ASSESSMENT/PLAN:    1. Upper abdominal pain  - Adult GI  Referral - Consult Only  - Adult GI Clinic Follow-Up Order (Blank); Future    2. Chronic idiopathic constipation      Emily Fritz is a 57 year old female who presents for evaluation of an upper abdominal pain and nausea that started several months ago.  Her symptoms have mostly resolved at this time with adjusting eating habits.  She almost canceled this appointment however she thought it be best to keep it to review her symptoms.  Work-up when she was symptomatic included an abdominal sonogram which was negative.  We reviewed potential etiologies.  She does have history of hiatal hernia and possible gastritis.  I advised if her symptoms return I would recommend an esophagram and upper GI x-ray (possibly with small bowel follow-through) for further evaluation.    She also has constipation which she feels is well controlled with MiraLAX once a week.  She can take MiraLAX more often if needed during her bowel movements.    She also mentions to me that she has a history of iron excess. She has been evaluated for hemochromatosis in the past with hematology Dr. Gayle in 2019.  Her testing  was negative for HFE mutation and her iron levels are being followed by her primary care and currently within the normal range. She does have a few family members with hemochromatosis mutation.    Follow up as needed.     No follow-ups on file.      Thank you for this consultation.  It was a pleasure to participate in the care of this patient; please contact us with any further questions.        This note was created with voice recognition software, and while reviewed for accuracy, typos may remain.       45 minutes spent on the date of the encounter doing chart review, history and exam, documentation and further activities per the note      Hira Lundberg PA-C  Division of Gastroenterology, Hepatology and Nutrition   [facility]            Video-Visit Details    Video Start Time: 11:35 AM    Type of service:  Video Visit    Video End Time:11:54 AM    Originating Location (pt. Location): Home    Distant Location (provider location):  Off-site    Platform used for Video Visit: Michael

## 2023-01-26 NOTE — PATIENT INSTRUCTIONS
It was a pleasure taking care of you today.  I've included a brief summary of our discussion and care plan from today's visit below.  Please review this information with your primary care provider.  _______________________________________________________________________     My recommendations are summarized as follows:     - return for follow up if you develop any new or worsening symptoms   ______________________________________________________________________     How do I schedule labs, imaging studies, or procedures that were ordered in clinic today?      Labs: To schedule lab appointment you can contact your local [facility] or call [phone] to schedule at any convenient [facility] location.     Procedures: If a colonoscopy, upper endoscopy, breath test, esophageal manometry, or pH impedence was ordered today, our endoscopy team will call you to schedule this. If you have not heard from our endoscopy team within a week, please call [phone] to schedule.      Imaging Studies: If you were scheduled for a CT scan, X-ray, MRI, ultrasound, HIDA scan or other imaging study, please call [phone] to have this scheduled.      Referral: If a referral to another specialty was ordered, expect a phone call or follow instructions above. If you have not heard from anyone regarding your referral in a week, please call our clinic to check the status.      Who do I call with any questions after my visit?  Please be in touch if there are any further questions that arise following today's visit.  There are multiple ways to contact your gastroenterology care team.       During business hours, you may reach a Gastroenterology nurse at [phone]     To schedule or reschedule an appointment, please call [phone].      You can always send a secure message through [employer].  MyChart messages are answered by your nurse or doctor typically within 24 hours.  Please allow extra time on weekends and  holidays.       For urgent/emergent questions after business hours, you may reach the on-call GI Fellow by contacting the [facility]  at [phone].     How will I get the results of any tests ordered?    You will receive all of your results.  If you have signed up for [employer], any tests ordered at your visit will be available to you after your physician reviews them.  Typically this takes 1-2 weeks.  If there are urgent results that require a change in your care plan, your physician or nurse will call you to discuss the next steps.       What is [employer]?  [employer] is a secure way for you to access all of your healthcare records from the [facility].  It is a web based computer program, so you can sign on to it from any location.  It also allows you to send secure messages to your care team.  I recommend signing up for [employer] access if you have not already done so and are comfortable with using a computer.       How to I schedule a follow-up visit?  If you did not schedule a follow-up visit today, please call [phone] to schedule a follow-up office visit.      Hira Lundberg PA-C  Division of Gastroenterology, Hepatology and Nutrition   [facility]

## 2023-02-07 NOTE — TELEPHONE ENCOUNTER
DIAGNOSIS: I fell in a parking lot and came down on my left knee.  I can no longer kneel as I get a sharp pain/burning sensation   APPOINTMENT DATE: 02/10/2023   NOTES STATUS DETAILS   OFFICE NOTE from referring provider N/A    OFFICE NOTE from other specialist Internal 09/05/2019 Dr Hook [facility]   08/20/2019 Dr Aguilar [facility]    DISCHARGE SUMMARY from hospital N/A    DISCHARGE REPORT from the ER N/A    OPERATIVE REPORT N/A    EMG report N/A    MEDICATION LIST N/A    MRI Internal 08/28/2019 LFT knee   DEXA (osteoporosis/bone health) N/A    CT SCAN N/A    XRAYS (IMAGES & REPORTS) Internal 08/20/2019 LFT knee

## 2023-02-08 NOTE — PROGRESS NOTES
Emily Fritz  :  1965  DOS: 2/10/2023  MRN: [id number]  PCP: Kehr, Kristen M    Sports Medicine Clinic Visit      HPI  Emily Fritz is a 57 year old female who is seen as a self referral presenting with a left knee injury. She fell in a parking lot onto her anterior left knee 6 weeks ago. Pain is sharp and occasionally burning, located over the anterior tibia, just lateral to the tibial tuberosity.  She only feels pain when kneeling directly on the knee, no pain at rest or at night.  Denies numbness, tingling, shooting radicular pain, weakness, swelling, mechanical locking symptoms. Denies any current treatments.    PMH:  Prior medial meniscus tear, parameniscal cyst, patella chondromalacia, ruptured Baker's cyst on MRI from 2019.  Received aspiration and injection with Dr. Hook. No recent images.     Social History:     Review of Systems  Musculoskeletal: as above  Remainder of review of systems is negative including constitutional, CV, pulmonary, GI, Skin and Neurologic except as noted in HPI or medical history.    Past Medical History:   Diagnosis Date     Allergic rhinitis due to animal dander      Chronic constipation      Diagnostic skin and sensitization tests 12 skin tests pos. for: dog(+)/DM/CR     Family history of breast cancer in mother 12/10/2014     Family history of breast cancer in mother      Family history of colonic polyps 12/10/2014     Family history of colonic polyps 12/10/2014     Family history of colonic polyps      Heart palpitations 3/2009    [place]     House dust mite allergy      Lateral epicondylitis 3/28/2012     Rhinitis, allergic to other allergen      Past Surgical History:   Procedure Laterality Date     C/SECTION, LOW TRANSVERSE  1988     CHOLECYSTECTOMY       COLONOSCOPY N/A 2021    Procedure: COLONOSCOPY, WITH POLYPECTOMY AND BIOPSY;  Surgeon: Sonia Brenner MD;  Location: [facility] OR     DILATION AND CURETTAGE, HYSTEROSCOPY  DIAGNOSTIC, COMBINED  08/25/2016    Menometrorrhagia     Family History   Problem Relation Age of Onset     Diabetes Mother 78     Hypertension Mother      Thyroid Disease Mother      Cancer Mother 78        breast-mastectomy     Circulatory Mother         DVT     Breast Cancer Mother 79     Hypertension Father      Cerebrovascular Disease Father      Heart Disease Father         CHF     Hypertension Sister      Other Cancer Sister         Carcinoid Cancer     Breast Cancer Maternal Aunt         postmenopausal     Macular Degeneration Paternal Aunt      Breast Cancer Maternal Aunt         postmenopausal     Gastrointestinal Disease Sister         gastric carcinoid tumors with mets to liver     Depression Brother         Suicide 7/8/2020     Glaucoma No family hx of        Objective  BP (!) 139/92   Wt 68 kg (150 lb)   LMP  (LMP Unknown)   BMI 21.83 kg/m        General: healthy, alert and in no acute distress      HEENT: no scleral icterus or conjunctival erythema     Skin: no suspicious lesions or rash. No jaundice.     CV: regular rhythm by palpation, 2+ distal pulses, no pedal edema      Resp: normal respiratory effort without conversational dyspnea     Psych: normal mood and affect      Gait: nonantalgic, appropriate coordination and balance     Neuro:        - Sensation to light touch:   Intact throughout the BLE including all peripheral nerve distributions.        - MSR:   2+ in bilateral patella, achilles.        - Special tests:   - Slump/SLR:   Neg bilaterally    MSK - Knee:       - Inspection:    - No significant effusion, erythema, warmth, ecchymosis, lesion.        - ROM:    - Full and painless.        - Palpation:    - TTP at the lateral tibial tuberosity  - NTTP elsewhere.        - Strength:    - 5/5 in BLE including HF, Hab, Hadd, KF, KE, DF, PF, EHL, Inv, Ev.        - Special tests:        - Lachman:   Neg        - A/P drawer:   Neg       - Pivot shift:   Neg   - Kameron:   Neg     - Varus stress:    Neg for laxity or pain    - Valgus stress:   Neg for laxity or pain   - Patellar grind:   Neg   - Thessaly:   Neg       - Functional tests:   - Single leg squat:  Able to perform without significant valgus deformity or imbalance    Radiology  I independently reviewed the available relevant imaging, with the following interpretation:   - MRI L knee 8/28/2019 showed tearing of the medial meniscus, parameniscal cyst, patella chondromalacia, small Baker's cyst that appeared to have ruptured.    I independently reviewed today's new relevant imaging, with the following interpretation:  - XR L knee shows normal anatomic alignment, no significant knee joint effusion, no significant degenerative changes, no fractures or dislocations.      Assessment  1. Injury of left knee, initial encounter        Plan  Emily Fritz is a 57 year old female that presents with left knee pain just lateral to the tibial tuberosity after direct trauma falling onto the knee. History and physical exam appear most consistent with a musculoskeletal/bony contusion.  Radiographs did not show any fracture, exam reveals a stable knee, and symptoms are only with direct pressure in kneeling.  Provided assurance, discussed the benign and self-limiting nature of the condition and treatment options and mutually agreed upon the following plan:    - Imaging:         - Reviewed relevant imaging in the chart, including XR L knee ordered today pre-clinic. Results above. Reviewed imaging with patient.   - Medications:         - Discussed pharmacologic options for pain relief. May use NSAIDs as needed for pain control. May also use topical creams such as IcyHot, BioFreeze, or Voltaren gel PRN.   - Modalities:         - May use ice, heat, massage or other modalities PRN.   - Bracing:         - May use a neoprene knee sleeve for comfort as needed.  - Activity:         - Encouraged to remain active and participate in regular activities as symptoms allow.  -  Follow up:         - As needed in the future for re-evaluation and update to treatment plan. Patient has clinic contact information for questions or concerns.       DO YAYA Black [facility] - Sports Medicine  [facility] - Department of Orthopedic Surgery

## 2023-02-10 ENCOUNTER — ANCILLARY PROCEDURE (OUTPATIENT)
Dept: GENERAL RADIOLOGY | Facility: CLINIC | Age: 58
End: 2023-02-10
Attending: STUDENT IN AN ORGANIZED HEALTH CARE EDUCATION/TRAINING PROGRAM
Payer: COMMERCIAL

## 2023-02-10 ENCOUNTER — PRE VISIT (OUTPATIENT)
Dept: ORTHOPEDICS | Facility: CLINIC | Age: 58
End: 2023-02-10
Payer: COMMERCIAL

## 2023-02-10 ENCOUNTER — OFFICE VISIT (OUTPATIENT)
Dept: ORTHOPEDICS | Facility: CLINIC | Age: 58
End: 2023-02-10
Payer: COMMERCIAL

## 2023-02-10 VITALS — BODY MASS INDEX: 21.83 KG/M2 | SYSTOLIC BLOOD PRESSURE: 139 MMHG | WEIGHT: 150 LBS | DIASTOLIC BLOOD PRESSURE: 92 MMHG

## 2023-02-10 DIAGNOSIS — S89.92XA INJURY OF LEFT KNEE, INITIAL ENCOUNTER: Primary | ICD-10-CM

## 2023-02-10 DIAGNOSIS — S89.92XA INJURY OF LEFT KNEE, INITIAL ENCOUNTER: ICD-10-CM

## 2023-02-10 PROCEDURE — 73564 X-RAY EXAM KNEE 4 OR MORE: CPT | Mod: LT | Performed by: RADIOLOGY

## 2023-02-10 PROCEDURE — 99204 OFFICE O/P NEW MOD 45 MIN: CPT | Performed by: STUDENT IN AN ORGANIZED HEALTH CARE EDUCATION/TRAINING PROGRAM

## 2023-02-10 ASSESSMENT — PAIN SCALES - GENERAL: PAINLEVEL: MILD PAIN (2)

## 2023-02-10 NOTE — LETTER
2/10/2023         RE: Emily Fritz  [address] [facility] [place] [address]        Dear Colleague,    Thank you for referring your patient, Emily Fritz, to the [facility] [place]. Please see a copy of my visit note below.    Emily Fritz  :  1965  DOS: 2/10/2023  MRN: [id number]  PCP: Kehr, Kristen M    Sports Medicine Clinic Visit      HPI  Emily Fritz is a 57 year old female who is seen as a self referral presenting with a left knee injury. She fell in a parking lot onto her anterior left knee 6 weeks ago. Pain is sharp and occasionally burning, located over the anterior tibia, just lateral to the tibial tuberosity.  She only feels pain when kneeling directly on the knee, no pain at rest or at night.  Denies numbness, tingling, shooting radicular pain, weakness, swelling, mechanical locking symptoms. Denies any current treatments.    PMH:  Prior medial meniscus tear, parameniscal cyst, patella chondromalacia, ruptured Baker's cyst on MRI from 2019.  Received aspiration and injection with Dr. Hook. No recent images.     Social History:     Review of Systems  Musculoskeletal: as above  Remainder of review of systems is negative including constitutional, CV, pulmonary, GI, Skin and Neurologic except as noted in HPI or medical history.    Past Medical History:   Diagnosis Date     Allergic rhinitis due to animal dander      Chronic constipation      Diagnostic skin and sensitization tests 12 skin tests pos. for: dog(+)/DM/CR     Family history of breast cancer in mother 12/10/2014     Family history of breast cancer in mother      Family history of colonic polyps 12/10/2014     Family history of colonic polyps 12/10/2014     Family history of colonic polyps      Heart palpitations 3/2009    Holter     House dust mite allergy      Lateral epicondylitis 3/28/2012     Rhinitis, allergic to other allergen      Past Surgical  History:   Procedure Laterality Date     C/SECTION, LOW TRANSVERSE  1988     CHOLECYSTECTOMY  2010     COLONOSCOPY N/A 07/27/2021    Procedure: COLONOSCOPY, WITH POLYPECTOMY AND BIOPSY;  Surgeon: Sonia Brenner MD;  Location: [facility] OR     DILATION AND CURETTAGE, HYSTEROSCOPY DIAGNOSTIC, COMBINED  08/25/2016    Menometrorrhagia     Family History   Problem Relation Age of Onset     Diabetes Mother 78     Hypertension Mother      Thyroid Disease Mother      Cancer Mother 78        breast-mastectomy     Circulatory Mother         DVT     Breast Cancer Mother 79     Hypertension Father      Cerebrovascular Disease Father      Heart Disease Father         CHF     Hypertension Sister      Other Cancer Sister         Carcinoid Cancer     Breast Cancer Maternal Aunt         postmenopausal     Macular Degeneration Paternal Aunt      Breast Cancer Maternal Aunt         postmenopausal     Gastrointestinal Disease Sister         gastric carcinoid tumors with mets to liver     Depression Brother         Suicide 7/8/2020     Glaucoma No family hx of        Objective  BP (!) 139/92   Wt 68 kg (150 lb)   LMP  (LMP Unknown)   BMI 21.83 kg/m        General: healthy, alert and in no acute distress      HEENT: no scleral icterus or conjunctival erythema     Skin: no suspicious lesions or rash. No jaundice.     CV: regular rhythm by palpation, 2+ distal pulses, no pedal edema      Resp: normal respiratory effort without conversational dyspnea     Psych: normal mood and affect      Gait: nonantalgic, appropriate coordination and balance     Neuro:        - Sensation to light touch:   Intact throughout the BLE including all peripheral nerve distributions.        - MSR:   2+ in bilateral patella, achilles.        - Special tests:   - Slump/SLR:   Neg bilaterally    MSK - Knee:       - Inspection:    - No significant effusion, erythema, warmth, ecchymosis, lesion.        - ROM:    - Full and painless.        - Palpation:    - TTP at the  lateral tibial tuberosity  - NTTP elsewhere.        - Strength:    - 5/5 in BLE including HF, Hab, Hadd, KF, KE, DF, PF, EHL, Inv, Ev.        - Special tests:        - Lachman:   Neg        - A/P drawer:   Neg       - Pivot shift:   Neg   - Kameron:   Neg     - Varus stress:   Neg for laxity or pain    - Valgus stress:   Neg for laxity or pain   - Patellar grind:   Neg   - Thessaly:   Neg       - Functional tests:   - Single leg squat:  Able to perform without significant valgus deformity or imbalance    Radiology  I independently reviewed the available relevant imaging, with the following interpretation:   - MRI L knee 8/28/2019 showed tearing of the medial meniscus, parameniscal cyst, patella chondromalacia, small Baker's cyst that appeared to have ruptured.    I independently reviewed today's new relevant imaging, with the following interpretation:  - XR L knee shows normal anatomic alignment, no significant knee joint effusion, no significant degenerative changes, no fractures or dislocations.      Assessment  1. Injury of left knee, initial encounter        Plan  Emily Fritz is a 57 year old female that presents with left knee pain just lateral to the tibial tuberosity after direct trauma falling onto the knee. History and physical exam appear most consistent with a musculoskeletal/bony contusion.  Radiographs did not show any fracture, exam reveals a stable knee, and symptoms are only with direct pressure in kneeling.  Provided assurance, discussed the benign and self-limiting nature of the condition and treatment options and mutually agreed upon the following plan:    - Imaging:         - Reviewed relevant imaging in the chart, including XR L knee ordered today pre-clinic. Results above. Reviewed imaging with patient.   - Medications:         - Discussed pharmacologic options for pain relief. May use NSAIDs as needed for pain control. May also use topical creams such as IcyHot, BioFreeze, or Voltaren  gel PRN.   - Modalities:         - May use ice, heat, massage or other modalities PRN.   - Bracing:         - May use a neoprene knee sleeve for comfort as needed.  - Activity:         - Encouraged to remain active and participate in regular activities as symptoms allow.  - Follow up:         - As needed in the future for re-evaluation and update to treatment plan. Patient has clinic contact information for questions or concerns.       Thor Dyson DO  [facility]  [facility] - Department of Orthopedic Surgery         Again, thank you for allowing me to participate in the care of your patient.        Sincerely,        Thor Dyson DO

## 2023-02-16 ENCOUNTER — OFFICE VISIT (OUTPATIENT)
Dept: OPTOMETRY | Facility: CLINIC | Age: 58
End: 2023-02-16
Payer: COMMERCIAL

## 2023-02-16 DIAGNOSIS — Z01.01 VISION EXAM WITH ABNORMAL FINDINGS: Primary | ICD-10-CM

## 2023-02-16 DIAGNOSIS — H04.123 DRY EYES: ICD-10-CM

## 2023-02-16 DIAGNOSIS — H52.03 HYPEROPIA, BILATERAL: ICD-10-CM

## 2023-02-16 DIAGNOSIS — H52.223 REGULAR ASTIGMATISM OF BOTH EYES: ICD-10-CM

## 2023-02-16 DIAGNOSIS — H52.4 PRESBYOPIA: ICD-10-CM

## 2023-02-16 PROCEDURE — 92014 COMPRE OPH EXAM EST PT 1/>: CPT | Performed by: OPTOMETRIST

## 2023-02-16 PROCEDURE — 92310 CONTACT LENS FITTING OU: CPT | Mod: GA | Performed by: OPTOMETRIST

## 2023-02-16 PROCEDURE — 92015 DETERMINE REFRACTIVE STATE: CPT | Performed by: OPTOMETRIST

## 2023-02-16 ASSESSMENT — REFRACTION_CURRENTRX
OS_SPHERE: +3.00
OS_BASECURVE: 8.6
OD_BASECURVE: 8.6
OD_ADD: LOW
OD_SPHERE: +3.00
OS_DIAMETER: 14.1
OD_DIAMETER: 14.1
OS_ADD: HIGH

## 2023-02-16 ASSESSMENT — REFRACTION_WEARINGRX
OD_CYLINDER: SPHERE
OS_AXIS: 090
SPECS_TYPE: PAL
OS_SPHERE: +2.50
OD_ADD: +2.25
OS_CYLINDER: +0.25
OD_SPHERE: +2.50
OS_ADD: +2.25

## 2023-02-16 ASSESSMENT — REFRACTION_MANIFEST
OS_CYLINDER: +0.75
OD_CYLINDER: +0.50
OS_AXIS: 082
OD_CYLINDER: +0.25
OS_SPHERE: +2.50
OD_ADD: +2.25
OS_ADD: +2.25
OS_AXIS: 080
OS_SPHERE: +2.50
OD_AXIS: 095
OD_SPHERE: +2.75
OS_CYLINDER: +0.75
OD_AXIS: 103
OD_SPHERE: +2.75

## 2023-02-16 ASSESSMENT — VISUAL ACUITY
OD_CC: 20/20-2
METHOD: SNELLEN - LINEAR
OD_CC: 20/30
OD_CC+: -1
CORRECTION_TYPE: GLASSES
OS_CC: 20/20
OS_CC: 20/20-2

## 2023-02-16 ASSESSMENT — CUP TO DISC RATIO
OD_RATIO: 0.15
OS_RATIO: 0.15

## 2023-02-16 ASSESSMENT — TONOMETRY
IOP_METHOD: APPLANATION
OS_IOP_MMHG: 11
OD_IOP_MMHG: 11

## 2023-02-16 ASSESSMENT — KERATOMETRY
OS_AXISANGLE2_DEGREES: 178
OD_K1POWER_DIOPTERS: 42.50
OD_K2POWER_DIOPTERS: 43.75
OD_AXISANGLE2_DEGREES: 175
OS_K2POWER_DIOPTERS: 44.25
OS_K1POWER_DIOPTERS: 42.75

## 2023-02-16 ASSESSMENT — SLIT LAMP EXAM - LIDS
COMMENTS: NORMAL
COMMENTS: NORMAL

## 2023-02-16 ASSESSMENT — CONF VISUAL FIELD
METHOD: COUNTING FINGERS
OD_INFERIOR_TEMPORAL_RESTRICTION: 0
OD_SUPERIOR_NASAL_RESTRICTION: 0
OD_SUPERIOR_TEMPORAL_RESTRICTION: 0
OS_NORMAL: 1
OS_INFERIOR_TEMPORAL_RESTRICTION: 0
OS_INFERIOR_NASAL_RESTRICTION: 0
OS_SUPERIOR_NASAL_RESTRICTION: 0
OD_INFERIOR_NASAL_RESTRICTION: 0
OS_SUPERIOR_TEMPORAL_RESTRICTION: 0
OD_NORMAL: 1

## 2023-02-16 ASSESSMENT — EXTERNAL EXAM - LEFT EYE: OS_EXAM: NORMAL

## 2023-02-16 ASSESSMENT — EXTERNAL EXAM - RIGHT EYE: OD_EXAM: NORMAL

## 2023-02-16 NOTE — LETTER
"    2/16/2023         RE: Emily Fritz  [address] [facility] [place] [address]        Dear Colleague,    Thank you for referring your patient, Emily Fritz, to the [facility]. Please see a copy of my visit note below.    Chief Complaint   Patient presents with     COMPREHENSIVE EYE EXAM     Contact Lens Re-fitting        Previous contact lens wearer? Yes: wears daily lenses, mostly special occasions, vacations   Comfort of contact lenses :Ok, eyes get dry    Satisfied with current lenses: Yes    Current contact lenses not great up close     Last Eye Exam: 2/15/22  Dilated Previously: Yes    What are you currently using to see?  glasses and contacts on occasion, vacation coming up     Distance Vision Acuity: Satisfied with vision, not aware of any changes.     Near Vision Acuity: Satisfied with vision while reading and using computer with glasses    Eye Comfort: good, left eye has been dry and she's been having \"pains or zingers\" in her left eye at times over the last couple weeks, also the left eye is red currently.  Do you use eye drops? : No, hasn't used them for a while. Will be getting some for vacation   Occupation or Hobbies:        Charmaine Randall Optometric Assistant      Medical, surgical and family histories reviewed and updated 2/16/2023.       OBJECTIVE: See Ophthalmology exam    ASSESSMENT:    ICD-10-CM    1. Vision exam with abnormal findings  Z01.01       2. Dry eyes L>>R  H04.123          PLAN:     Patient Instructions   Fill glasses prescription  Use over the counter artificial tears 3 times a day ( Thera Tears,  Systane Ultra or Systane Complete )  Return in 1 year for eye exam    Wait to wear trials until left eye feels normal and is no longer red.  Test trials to make sure you like the vision   If not return to clinic for contact lens check wearing the trials   Contact lenses prescription released to patient today    Meera Roach, " OD  [phone]                                      Again, thank you for allowing me to participate in the care of your patient.        Sincerely,        Meera Roach, OD

## 2023-02-16 NOTE — PATIENT INSTRUCTIONS
Fill glasses prescription  Use over the counter artificial tears 3 times a day ( Thera Tears,  Systane Ultra or Systane Complete )  Return in 1 year for eye exam    Wait to wear trials until left eye feels normal and is no longer red.  Test trials to make sure you like the vision   If not return to clinic for contact lens check wearing the trials   Contact lenses prescription released to patient today    Meera Roach, OD  [phone]

## 2023-02-16 NOTE — PROGRESS NOTES
"Chief Complaint   Patient presents with     COMPREHENSIVE EYE EXAM     Contact Lens Re-fitting        Previous contact lens wearer? Yes: wears daily lenses, mostly special occasions, vacations   Comfort of contact lenses :Ok, eyes get dry    Satisfied with current lenses: Yes    Current contact lenses not great up close     Last Eye Exam: 2/15/22  Dilated Previously: Yes    What are you currently using to see?  glasses and contacts on occasion, vacation coming up     Distance Vision Acuity: Satisfied with vision, not aware of any changes.     Near Vision Acuity: Satisfied with vision while reading and using computer with glasses    Eye Comfort: good, left eye has been dry and she's been having \"pains or zingers\" in her left eye at times over the last couple weeks, also the left eye is red currently.  Do you use eye drops? : No, hasn't used them for a while. Will be getting some for vacation   Occupation or Hobbies:        Charmaine Randall Optometric Assistant      Medical, surgical and family histories reviewed and updated 2/16/2023.       OBJECTIVE: See Ophthalmology exam    ASSESSMENT:    ICD-10-CM    1. Vision exam with abnormal findings  Z01.01       2. Dry eyes L>>R  H04.123          PLAN:     Patient Instructions   Fill glasses prescription  Use over the counter artificial tears 3 times a day ( Thera Tears,  Systane Ultra or Systane Complete )  Return in 1 year for eye exam    Wait to wear trials until left eye feels normal and is no longer red.  Test trials to make sure you like the vision   If not return to clinic for contact lens check wearing the trials   Contact lenses prescription released to patient today    Meera Roach, OD  [phone]                                  "

## 2023-04-25 ENCOUNTER — MYC MEDICAL ADVICE (OUTPATIENT)
Dept: OPTOMETRY | Facility: CLINIC | Age: 58
End: 2023-04-25
Payer: COMMERCIAL

## 2023-04-26 NOTE — TELEPHONE ENCOUNTER
Left a voicemail for patient to call me back to schedule an appt for contact lens recheck.     Jada Davalos on 4/26/2023 at 9:38 AM'

## 2023-05-18 ENCOUNTER — TELEPHONE (OUTPATIENT)
Dept: OPTOMETRY | Facility: CLINIC | Age: 58
End: 2023-05-18
Payer: COMMERCIAL

## 2023-05-18 NOTE — TELEPHONE ENCOUNTER
[facility] Call Center    Phone Message    May a detailed message be left on voicemail: yes     Reason for Call: Other: pt requesting a revision of her contact RX and is scheduled for 6/15. pt stated the contact numbers she was given do not work. please contact pt to discuss possibilities of a tech appt. Thank you     Action Taken: Message routed to:  Other:     Travel Screening: Not Applicable

## 2023-05-18 NOTE — TELEPHONE ENCOUNTER
Spoke with patient to reschedule her appointment for contact lens check.     Jada Davalos on 5/18/2023 at 3:52 PM

## 2023-05-23 ENCOUNTER — OFFICE VISIT (OUTPATIENT)
Dept: OPTOMETRY | Facility: CLINIC | Age: 58
End: 2023-05-23
Payer: COMMERCIAL

## 2023-05-23 DIAGNOSIS — H52.4 PRESBYOPIA: ICD-10-CM

## 2023-05-23 DIAGNOSIS — H52.221 REGULAR ASTIGMATISM, RIGHT EYE: ICD-10-CM

## 2023-05-23 DIAGNOSIS — H52.03 HYPEROPIA, BILATERAL: Primary | ICD-10-CM

## 2023-05-23 PROCEDURE — 99207 PR NO CHARGE LOS: CPT | Performed by: OPTOMETRIST

## 2023-05-23 ASSESSMENT — REFRACTION_CURRENTRX
OS_SPHERE: +2.75
OS_ADD: HIGH
OS_ADD: HIGH
OS_BASECURVE: 8.6
OS_ADD: HIGH
OS_BASECURVE: 8.6
OS_DIAMETER: 14.1
OS_DIAMETER: 14.1
OS_SPHERE: +2.75
OD_ADD: LOW
OS_SPHERE: +3.00
OD_ADD: LOW

## 2023-05-23 ASSESSMENT — KERATOMETRY
OD_K2POWER_DIOPTERS: 46.50
OD_AXISANGLE2_DEGREES: 179
OS_AXISANGLE2_DEGREES: 178
OD_AXISANGLE_DEGREES: 089
OS_K2POWER_DIOPTERS: 46.75
OS_AXISANGLE_DEGREES: 088
OS_K1POWER_DIOPTERS: 45.00

## 2023-05-23 ASSESSMENT — VISUAL ACUITY
OS_PH_CC: 20/40
METHOD: SNELLEN - LINEAR
CORRECTION_TYPE: CONTACTS
OS_CC: 20/70
OS_CC+: -3

## 2023-05-23 ASSESSMENT — REFRACTION_WEARINGRX
SPECS_TYPE: PAL
OD_ADD: +2.25
OS_ADD: +2.25

## 2023-05-23 NOTE — PATIENT INSTRUCTIONS
Contacts on order.  We will notify you to when contacts are ready to be picked up.  Keep track of the 2 different kinds, wear best pair to contact lenses check appointment   Wear contact lenses to contact lenses check appointment 1-2 weeks later.    Meera Roach, OD  [phone]

## 2023-05-23 NOTE — PROGRESS NOTES
Chief Complaint   Patient presents with     Contact Lens Problem Both Eyes     Satisfied with contacts:  No blurred distance vision  , near vision is  great   Good comfort:  Yes  Clear vision:     No    Jewels GLORY Davalos             Medical, surgical and family histories reviewed and updated 5/23/2023.       OBJECTIVE: See Ophthalmology exam    ASSESSMENT:    ICD-10-CM    1. Hyperopia, bilateral  H52.03       2. Presbyopia  H52.4       3. Regular astigmatism, right eye  H52.221          PLAN:    Patient Instructions     Contacts on order.  We will notify you to when contacts are ready to be picked up.  Keep track of the 2 different kinds, wear best pair to contact lenses check appointment   Wear contact lenses to contact lenses check appointment 1-2 weeks later.    Meera Roach, OD  [phone]

## 2023-05-23 NOTE — LETTER
5/23/2023         RE: Emily Fritz  [address] [facility] [place] [address]        Dear Colleague,    Thank you for referring your patient, Emily Fritz, to the [facility]. Please see a copy of my visit note below.    Chief Complaint   Patient presents with     Contact Lens Problem Both Eyes     Satisfied with contacts:  No blurred distance vision  , near vision is  great   Good comfort:  Yes  Clear vision:     No    Jewels Davalos, OA             Medical, surgical and family histories reviewed and updated 5/23/2023.       OBJECTIVE: See Ophthalmology exam    ASSESSMENT:    ICD-10-CM    1. Hyperopia, bilateral  H52.03       2. Presbyopia  H52.4       3. Regular astigmatism, right eye  H52.221          PLAN:    Patient Instructions     Contacts on order.  We will notify you to when contacts are ready to be picked up.  Keep track of the 2 different kinds, wear best pair to contact lenses check appointment   Wear contact lenses to contact lenses check appointment 1-2 weeks later.    Meera Roach, OD  [phone]                                      Again, thank you for allowing me to participate in the care of your patient.        Sincerely,        Meera Roach, OD

## 2023-05-24 ASSESSMENT — REFRACTION_WEARINGRX
OD_CYLINDER: +0.25
OS_AXIS: 080
OS_SPHERE: +2.50
OD_SPHERE: +2.75
OD_AXIS: 095
OS_CYLINDER: +0.75

## 2023-05-24 ASSESSMENT — REFRACTION_CURRENTRX
OD_DIAMETER: 14.1
OD_BASECURVE: 8.6
OD_BASECURVE: 8.6
OD_BRAND: COOPER CLARITI 1 DAY BC 8.6, D 14.1
OD_SPHERE: +3.00
OD_DIAMETER: 14.1
OD_SPHERE: +2.50
OD_SPHERE: +2.50

## 2023-05-24 ASSESSMENT — KERATOMETRY: OD_K1POWER_DIOPTERS: 45.00

## 2023-06-16 ENCOUNTER — TELEPHONE (OUTPATIENT)
Dept: OPTOMETRY | Facility: CLINIC | Age: 58
End: 2023-06-16
Payer: COMMERCIAL

## 2023-06-16 NOTE — TELEPHONE ENCOUNTER
Elsa picked up her trial contacts today and will call us back to schedule her follow up appointment

## 2023-07-14 DIAGNOSIS — G43.109 MIGRAINE WITH AURA AND WITHOUT STATUS MIGRAINOSUS, NOT INTRACTABLE: ICD-10-CM

## 2023-07-14 RX ORDER — RIZATRIPTAN BENZOATE 10 MG/1
TABLET ORAL
Qty: 18 TABLET | Refills: 3 | Status: SHIPPED | OUTPATIENT
Start: 2023-07-14 | End: 2024-02-15

## 2023-07-19 ENCOUNTER — PATIENT OUTREACH (OUTPATIENT)
Dept: CARE COORDINATION | Facility: CLINIC | Age: 58
End: 2023-07-19
Payer: COMMERCIAL

## 2023-08-14 ENCOUNTER — PATIENT OUTREACH (OUTPATIENT)
Dept: CARE COORDINATION | Facility: CLINIC | Age: 58
End: 2023-08-14
Payer: COMMERCIAL

## 2023-08-16 ENCOUNTER — PATIENT OUTREACH (OUTPATIENT)
Dept: CARE COORDINATION | Facility: CLINIC | Age: 58
End: 2023-08-16
Payer: COMMERCIAL

## 2023-08-28 ENCOUNTER — PATIENT OUTREACH (OUTPATIENT)
Dept: CARE COORDINATION | Facility: CLINIC | Age: 58
End: 2023-08-28
Payer: COMMERCIAL

## 2023-09-06 ASSESSMENT — REFRACTION_CURRENTRX
OS_ADD: HIGH
OD_BASECURVE: 8.6
OS_SPHERE: +2.75
OS_SPHERE: +2.75
OS_ADD: HIGH
OD_ADD: LOW
OD_SPHERE: +2.50
OS_DIAMETER: 14.1
OD_DIAMETER: 14.1
OD_BRAND: COOPER CLARITI 1 DAY BC 8.6, D 14.1
OS_BASECURVE: 8.6
OD_SPHERE: +2.50

## 2023-09-07 ENCOUNTER — OFFICE VISIT (OUTPATIENT)
Dept: OPTOMETRY | Facility: CLINIC | Age: 58
End: 2023-09-07
Payer: COMMERCIAL

## 2023-09-07 DIAGNOSIS — H04.123 DRY EYES: ICD-10-CM

## 2023-09-07 DIAGNOSIS — H52.221 REGULAR ASTIGMATISM, RIGHT EYE: ICD-10-CM

## 2023-09-07 DIAGNOSIS — H52.03 HYPEROPIA, BILATERAL: Primary | ICD-10-CM

## 2023-09-07 DIAGNOSIS — H52.4 PRESBYOPIA: ICD-10-CM

## 2023-09-07 PROCEDURE — 99207 PR NO CHARGE LOS: CPT | Performed by: OPTOMETRIST

## 2023-09-07 ASSESSMENT — VISUAL ACUITY
OS_CC+: -1
OD_CC: 20/25
OS_CC: 20/20
OS_CC: 20/50
OD_CC: 20/100-1
METHOD: SNELLEN - LINEAR
CORRECTION_TYPE: CONTACTS
OD_CC+: -1

## 2023-09-07 NOTE — Clinical Note
9/7/2023         RE: Emily Fritz  [address] [facility] [place] [address]        Dear Colleague,    Thank you for referring your patient, Emily Fritz, to the [facility]. Please see a copy of my visit note below.    No notes on file    Again, thank you for allowing me to participate in the care of your patient.        Sincerely,        Meera Roach, OD

## 2023-09-07 NOTE — PROGRESS NOTES
Chief Complaint   Patient presents with    Contact Lens Check     Satisfied with contacts:  Yes ,  Good comfort:  Yes  Clear vision:     Yes, Distance is good, close up is ok. She just wants to be done.     Charmaine Randall Optometric Assistant           Medical, surgical and family histories reviewed and updated 9/7/2023.       OBJECTIVE: See Ophthalmology exam    ASSESSMENT:    ICD-10-CM    1. Hyperopia, bilateral  H52.03       2. Presbyopia  H52.4       3. Regular astigmatism, right eye  H52.221       4. Dry eyes L>>R  H04.123          PLAN:    Patient Instructions   Contact lenses prescription released to patient today.  Return to clinic 1 year for eye exam.    Meera Roach, OD  [phone]

## 2023-09-07 NOTE — PATIENT INSTRUCTIONS
Contact lenses prescription released to patient today.  Return to clinic 1 year for eye exam.    Meera Roach, OD  [phone]

## 2023-10-29 ENCOUNTER — HEALTH MAINTENANCE LETTER (OUTPATIENT)
Age: 58
End: 2023-10-29

## 2023-12-21 ENCOUNTER — ANCILLARY PROCEDURE (OUTPATIENT)
Dept: MAMMOGRAPHY | Facility: OTHER | Age: 58
End: 2023-12-21
Attending: PHYSICIAN ASSISTANT
Payer: COMMERCIAL

## 2023-12-21 DIAGNOSIS — Z12.31 VISIT FOR SCREENING MAMMOGRAM: ICD-10-CM

## 2023-12-21 PROCEDURE — 77067 SCR MAMMO BI INCL CAD: CPT | Mod: TC | Performed by: RADIOLOGY

## 2023-12-21 PROCEDURE — 77063 BREAST TOMOSYNTHESIS BI: CPT | Mod: TC | Performed by: RADIOLOGY

## 2024-01-11 ENCOUNTER — MYC MEDICAL ADVICE (OUTPATIENT)
Dept: OPTOMETRY | Facility: CLINIC | Age: 59
End: 2024-01-11
Payer: COMMERCIAL

## 2024-01-11 ASSESSMENT — REFRACTION_MANIFEST
OS_SPHERE: +2.75
OD_AXIS: 100
OS_ADD: +2.25
OD_SPHERE: +2.25
OD_CYLINDER: +1.00
OD_ADD: +2.25
OS_CYLINDER: SPHERE

## 2024-02-14 SDOH — HEALTH STABILITY: PHYSICAL HEALTH: ON AVERAGE, HOW MANY MINUTES DO YOU ENGAGE IN EXERCISE AT THIS LEVEL?: 30 MIN

## 2024-02-14 SDOH — HEALTH STABILITY: PHYSICAL HEALTH: ON AVERAGE, HOW MANY DAYS PER WEEK DO YOU ENGAGE IN MODERATE TO STRENUOUS EXERCISE (LIKE A BRISK WALK)?: 2 DAYS

## 2024-02-14 ASSESSMENT — SOCIAL DETERMINANTS OF HEALTH (SDOH): HOW OFTEN DO YOU GET TOGETHER WITH FRIENDS OR RELATIVES?: ONCE A WEEK

## 2024-02-14 NOTE — PATIENT INSTRUCTIONS
Preventive Care Advice   This is general advice given by our system to help you stay healthy. However, your care team may have specific advice just for you. Please talk to your care team about your preventive care needs.  Nutrition  Eat 5 or more servings of fruits and vegetables each day.  Try wheat bread, brown rice and whole grain pasta (instead of white bread, rice, and pasta).  Get enough calcium and vitamin D. Check the label on foods and aim for 100% of the RDA (recommended daily allowance).  Lifestyle  Exercise at least 150 minutes each week  (30 minutes a day, 5 days a week).  Do muscle strengthening activities 2 days a week. These help control your weight and prevent disease.  No smoking.  Wear sunscreen to prevent skin cancer.  Have a dental exam and cleaning every 6 months.  Yearly exams  See your health care team every year to talk about:  Any changes in your health.  Any medicines your care team has prescribed.  Preventive care, family planning, and ways to prevent chronic diseases.  Shots (vaccines)   HPV shots (up to age 26), if you've never had them before.  Hepatitis B shots (up to age 59), if you've never had them before.  COVID-19 shot: Get this shot when it's due.  Flu shot: Get a flu shot every year.  Tetanus shot: Get a tetanus shot every 10 years.  Pneumococcal, hepatitis A, and RSV shots: Ask your care team if you need these based on your risk.  Shingles shot (for age 50 and up)  General health tests  Diabetes screening:  Starting at age 35, Get screened for diabetes at least every 3 years.  If you are younger than age 35, ask your care team if you should be screened for diabetes.  Cholesterol test: At age 39, start having a cholesterol test every 5 years, or more often if advised.  Bone density scan (DEXA): At age 50, ask your care team if you should have this scan for osteoporosis (brittle bones).  Hepatitis C: Get tested at least once in your life.  STIs (sexually transmitted  infections)  Before age 24: Ask your care team if you should be screened for STIs.  After age 24: Get screened for STIs if you're at risk. You are at risk for STIs (including HIV) if:  You are sexually active with more than one person.  You don't use condoms every time.  You or a partner was diagnosed with a sexually transmitted infection.  If you are at risk for HIV, ask about PrEP medicine to prevent HIV.  Get tested for HIV at least once in your life, whether you are at risk for HIV or not.  Cancer screening tests  Cervical cancer screening: If you have a cervix, begin getting regular cervical cancer screening tests starting at age 21.  Breast cancer scan (mammogram): If you've ever had breasts, begin having regular mammograms starting at age 40. This is a scan to check for breast cancer.  Colon cancer screening: It is important to start screening for colon cancer at age 45.  Have a colonoscopy test every 10 years (or more often if you're at risk) Or, ask your provider about stool tests like a FIT test every year or Cologuard test every 3 years.  To learn more about your testing options, visit:   https://www.Blink (air taxi)/009667.pdf.  For help making a decision, visit:   https://bit.ly/fo65650.  Prostate cancer screening test: If you have a prostate, ask your care team if a prostate cancer screening test (PSA) at age 55 is right for you.  Lung cancer screening: If you are a current or former smoker ages 50 to 80, ask your care team if ongoing lung cancer screenings are right for you.  For informational purposes only. Not to replace the advice of your health care provider. Copyright   2023 [facility]. All rights reserved. Clinically reviewed by the [facility] Transitions Program. Kid Bunch 488153 - REV 01/24.    Learning About Stress  What is stress?     Stress is your body's response to a hard situation. Your body can have a physical, emotional, or mental response. Stress is a fact of life for  most people, and it affects everyone differently. What causes stress for you may not be stressful for someone else.  A lot of things can cause stress. You may feel stress when you go on a job interview, take a test, or run a race. This kind of short-term stress is normal and even useful. It can help you if you need to work hard or react quickly. For example, stress can help you finish an important job on time.  Long-term stress is caused by ongoing stressful situations or events. Examples of long-term stress include long-term health problems, ongoing problems at work, or conflicts in your family. Long-term stress can harm your health.  How does stress affect your health?  When you are stressed, your body responds as though you are in danger. It makes hormones that speed up your heart, make you breathe faster, and give you a burst of energy. This is called the fight-or-flight stress response. If the stress is over quickly, your body goes back to normal and no harm is done.  But if stress happens too often or lasts too long, it can have bad effects. Long-term stress can make you more likely to get sick, and it can make symptoms of some diseases worse. If you tense up when you are stressed, you may develop neck, shoulder, or low back pain. Stress is linked to high blood pressure and heart disease.  Stress also harms your emotional health. It can make you spann, tense, or depressed. Your relationships may suffer, and you may not do well at work or school.  What can you do to manage stress?  You can try these things to help manage stress:   Do something active. Exercise or activity can help reduce stress. Walking is a great way to get started. Even everyday activities such as housecleaning or yard work can help.  Try yoga or ambrosio chi. These techniques combine exercise and meditation. You may need some training at first to learn them.  Do something you enjoy. For example, listen to music or go to a movie. Practice your  "hobby or do volunteer work.  Meditate. This can help you relax, because you are not worrying about what happened before or what may happen in the future.  Do guided imagery. Imagine yourself in any setting that helps you feel calm. You can use online videos, books, or a teacher to guide you.  Do breathing exercises. For example:  From a standing position, bend forward from the waist with your knees slightly bent. Let your arms dangle close to the floor.  Breathe in slowly and deeply as you return to a standing position. Roll up slowly and lift your head last.  Hold your breath for just a few seconds in the standing position.  Breathe out slowly and bend forward from the waist.  Let your feelings out. Talk, laugh, cry, and express anger when you need to. Talking with supportive friends or family, a counselor, or a lobito leader about your feelings is a healthy way to relieve stress. Avoid discussing your feelings with people who make you feel worse.  Write. It may help to write about things that are bothering you. This helps you find out how much stress you feel and what is causing it. When you know this, you can find better ways to cope.  What can you do to prevent stress?  You might try some of these things to help prevent stress:  Manage your time. This helps you find time to do the things you want and need to do.  Get enough sleep. Your body recovers from the stresses of the day while you are sleeping.  Get support. Your family, friends, and community can make a difference in how you experience stress.  Limit your news feed. Avoid or limit time on social media or news that may make you feel stressed.  Do something active. Exercise or activity can help reduce stress. Walking is a great way to get started.  Where can you learn more?  Go to https://www.healthwise.net/patiented  Enter N032 in the search box to learn more about \"Learning About Stress.\"  Current as of: February 26, 2023               Content Version: " 13.8    6762-7407 [employer].   Care instructions adapted under license by your healthcare professional. If you have questions about a medical condition or this instruction, always ask your healthcare professional. [employer] disclaims any warranty or liability for your use of this information.

## 2024-02-15 ENCOUNTER — OFFICE VISIT (OUTPATIENT)
Dept: FAMILY MEDICINE | Facility: CLINIC | Age: 59
End: 2024-02-15
Payer: COMMERCIAL

## 2024-02-15 VITALS
OXYGEN SATURATION: 100 % | HEART RATE: 84 BPM | TEMPERATURE: 97.4 F | RESPIRATION RATE: 16 BRPM | HEIGHT: 70 IN | BODY MASS INDEX: 22.05 KG/M2 | DIASTOLIC BLOOD PRESSURE: 82 MMHG | SYSTOLIC BLOOD PRESSURE: 122 MMHG | WEIGHT: 154 LBS

## 2024-02-15 DIAGNOSIS — Z00.00 ROUTINE GENERAL MEDICAL EXAMINATION AT A HEALTH CARE FACILITY: Primary | ICD-10-CM

## 2024-02-15 DIAGNOSIS — G43.109 MIGRAINE WITH AURA AND WITHOUT STATUS MIGRAINOSUS, NOT INTRACTABLE: ICD-10-CM

## 2024-02-15 DIAGNOSIS — M54.2 CERVICALGIA: ICD-10-CM

## 2024-02-15 LAB
CHOLEST SERPL-MCNC: 191 MG/DL
FASTING STATUS PATIENT QL REPORTED: YES
FASTING STATUS PATIENT QL REPORTED: YES
GLUCOSE SERPL-MCNC: 98 MG/DL (ref 70–99)
HDLC SERPL-MCNC: 72 MG/DL
LDLC SERPL CALC-MCNC: 111 MG/DL
NONHDLC SERPL-MCNC: 119 MG/DL
TRIGL SERPL-MCNC: 40 MG/DL

## 2024-02-15 PROCEDURE — 36415 COLL VENOUS BLD VENIPUNCTURE: CPT | Performed by: PHYSICIAN ASSISTANT

## 2024-02-15 PROCEDURE — 80061 LIPID PANEL: CPT | Performed by: PHYSICIAN ASSISTANT

## 2024-02-15 PROCEDURE — 82947 ASSAY GLUCOSE BLOOD QUANT: CPT | Performed by: PHYSICIAN ASSISTANT

## 2024-02-15 PROCEDURE — 90715 TDAP VACCINE 7 YRS/> IM: CPT | Performed by: PHYSICIAN ASSISTANT

## 2024-02-15 PROCEDURE — 99396 PREV VISIT EST AGE 40-64: CPT | Mod: 25 | Performed by: PHYSICIAN ASSISTANT

## 2024-02-15 PROCEDURE — 90471 IMMUNIZATION ADMIN: CPT | Performed by: PHYSICIAN ASSISTANT

## 2024-02-15 PROCEDURE — 99214 OFFICE O/P EST MOD 30 MIN: CPT | Mod: 25 | Performed by: PHYSICIAN ASSISTANT

## 2024-02-15 RX ORDER — RIZATRIPTAN BENZOATE 10 MG/1
TABLET ORAL
Qty: 18 TABLET | Refills: 5 | Status: SHIPPED | OUTPATIENT
Start: 2024-02-15

## 2024-02-15 RX ORDER — CYCLOBENZAPRINE HCL 10 MG
10 TABLET ORAL 3 TIMES DAILY PRN
Qty: 30 TABLET | Refills: 1 | Status: SHIPPED | OUTPATIENT
Start: 2024-02-15 | End: 2024-06-04

## 2024-02-15 ASSESSMENT — PAIN SCALES - GENERAL: PAINLEVEL: NO PAIN (0)

## 2024-02-15 NOTE — PROGRESS NOTES
Preventive Care Visit  [facility] [place] Kristen M. Kehr, PA-C, Family Medicine  Feb 15, 2024    Assessment & Plan     Routine general medical examination at a health care facility  Health maintenance reviewed and updated.  - Glucose; Future  - Lipid panel reflex to direct LDL Fasting; Future  - Glucose  - Lipid panel reflex to direct LDL Fasting    Cervicalgia  She is going to try flexeril. Side effects and how to take the medication discussed.  Okay to use NSAIDS for muscle tension also.   Start therapy if chiropractic is not helpful.   - cyclobenzaprine (FLEXERIL) 10 MG tablet; Take 1 tablet (10 mg) by mouth 3 times daily as needed for muscle spasms  - Physical Therapy Referral; Future    Migraine with aura and without status migrainosus, not intractable  Stable, refills given   - rizatriptan (MAXALT) 10 MG tablet; TAKE 1 TAB AT ONSET OF HEADACHE FOR MIGRAINE. REPEAT IN 2 HOURS IF NEEDED. MAX 3 TABLETS/24 HOURS.  - Physical Therapy Referral; Future            Counseling  Appropriate preventive services were discussed with this patient, including applicable screening as appropriate for fall prevention, nutrition, physical activity, Tobacco-use cessation, weight loss and cognition.  Checklist reviewing preventive services available has been given to the patient.  Reviewed patient's diet, addressing concerns and/or questions.   She is at risk for lack of exercise and has been provided with information to increase physical activity for the benefit of her well-being.             Maribel Hunter is a 58 year old, presenting for the following:  Physical        2/15/2024     8:17 AM   Additional Questions   Roomed by Maame   Accompanied by Self        Health Care Directive  Patient does not have a Health Care Directive or Living Will: not discussed    HPI    PROBLEMS TO ADD:  Migraine: she will need refills of the maxalt.   Cervicalgia: more muscle tension in her posterior neck. This has caused more  headaches. She has been taking some tylenol and will wake and try to sleep on the couch to change position. She is planning to go to the chiropractor. She is also doing yoga which she feels may have exacerbated.           2/14/2024   General Health   How would you rate your overall physical health? Good   Feel stress (tense, anxious, or unable to sleep) To some extent   (!) STRESS CONCERN      2/14/2024   Nutrition   Three or more servings of calcium each day? (!) NO   Diet: Regular (no restrictions)   How many servings of fruit and vegetables per day? (!) 0-1   How many sweetened beverages each day? 0-1         2/14/2024   Exercise   Days per week of moderate/strenous exercise 2 days   Average minutes spent exercising at this level 30 min   (!) EXERCISE CONCERN      2/14/2024   Social Factors   Frequency of gathering with friends or relatives Once a week   Worry food won't last until get money to buy more No   Food not last or not have enough money for food? No   Do you have housing?  Yes   Are you worried about losing your housing? No   Lack of transportation? No   Unable to get utilities (heat,electricity)? No         2/14/2024   Fall Risk   Fallen 2 or more times in the past year? No   Trouble with walking or balance? No          2/14/2024   Dental   Dentist two times every year? Yes         2/14/2024   TB Screening   Were you born outside of [place]?  No         Today's PHQ-2 Score:       2/14/2024     2:01 PM   PHQ-2 ( 1999 [employer])   Q1: Little interest or pleasure in doing things 1   Q2: Feeling down, depressed or hopeless 0   PHQ-2 Score 1   Q1: Little interest or pleasure in doing things Several days   Q2: Feeling down, depressed or hopeless Not at all   PHQ-2 Score 1           2/14/2024   Substance Use   Alcohol more than 3/day or more than 7/wk No   Do you use any other substances recreationally? No     Social History     Tobacco Use    Smoking status: Former     Packs/day: 0.00     Years: 10.00     Additional  pack years: 0.00     Total pack years: 0.00     Types: Cigarettes     Quit date: 1995     Years since quittin.1     Passive exposure: Never    Smokeless tobacco: Never    Tobacco comments:     lives in smoke free household   Vaping Use    Vaping Use: Never used   Substance Use Topics    Alcohol use: Yes    Drug use: No           2022   LAST FHS-7 RESULTS   1st degree relative breast or ovarian cancer Yes   Any relative bilateral breast cancer Unknown        Mammogram Screening - Mammogram every 1-2 years updated in Health Maintenance based on mutual decision making        2024   STI Screening   New sexual partner(s) since last STI/HIV test? No     History of abnormal Pap smear: NO - age 30-65 PAP every 5 years with negative HPV co-testing recommended  Last 3 Pap and HPV Results:       Latest Ref Rng & Units 2022    11:03 AM 2017    10:01 AM 2017     9:55 AM   PAP / HPV   PAP  Negative for Intraepithelial Lesion or Malignancy (NILM)      PAP (Historical)   NIL     HPV 16 DNA Negative Negative   Negative    HPV 18 DNA Negative Negative   Negative    Other HR HPV Negative Negative   Negative            Latest Ref Rng & Units 2022    11:03 AM 2017    10:01 AM 2017     9:55 AM   PAP / HPV   PAP  Negative for Intraepithelial Lesion or Malignancy (NILM)      PAP (Historical)   NIL     HPV 16 DNA Negative Negative   Negative    HPV 18 DNA Negative Negative   Negative    Other HR HPV Negative Negative   Negative      The 10-year ASCVD risk score (Yobani YIN, et al., 2019) is: 2%    Values used to calculate the score:      Age: 58 years      Sex: Female      Is Non- : No      Diabetic: No      Tobacco smoker: No      Systolic Blood Pressure: 122 mmHg      Is BP treated: No      HDL Cholesterol: 75 mg/dL      Total Cholesterol: 215 mg/dL           Reviewed and updated as needed this visit by Provider   Tobacco  Allergies  Meds  Problems  Med Hx   "Surg Hx  Fam Hx            Past Medical History:   Diagnosis Date    Allergic rhinitis due to animal dander     Chronic constipation     Diagnostic skin and sensitization tests 12 skin tests pos. for: dog(+)/DM/CR    Family history of breast cancer in mother 12/10/2014    Family history of breast cancer in mother     Family history of colonic polyps 12/10/2014    Family history of colonic polyps 12/10/2014    Family history of colonic polyps     Heart palpitations 3/2009    Holter    House dust mite allergy     Lateral epicondylitis 3/28/2012    Rhinitis, allergic to other allergen      Past Surgical History:   Procedure Laterality Date    C/SECTION, LOW TRANSVERSE  1988    CHOLECYSTECTOMY      COLONOSCOPY N/A 2021    Procedure: COLONOSCOPY, WITH POLYPECTOMY AND BIOPSY;  Surgeon: Sonia Brenner MD;  Location: [facility] OR    DILATION AND CURETTAGE, HYSTEROSCOPY DIAGNOSTIC, COMBINED  2016    Menometrorrhagia     OB History    Para Term  AB Living   3 3 3 0 0 3   SAB IAB Ectopic Multiple Live Births   0 0 0 0 3      # Outcome Date GA Lbr Brannon/2nd Weight Sex Delivery Anes PTL Lv   3 Term 93 40w0d  3.402 kg (7 lb 8 oz) M    EMMA      Name: Deion   2 Term 92 40w0d  3.572 kg (7 lb 14 oz) M    EMMA      Name: Roc   1 Term 88 40w0d  3.345 kg (7 lb 6 oz) F -SEC   EMMA      Name: Tammy         Review of Systems  Constitutional, HEENT, cardiovascular, pulmonary, GI, , musculoskeletal, neuro, skin, endocrine and psych systems are negative, except as otherwise noted.     Objective    Exam  /82   Pulse 84   Temp 97.4  F (36.3  C) (Tympanic)   Resp 16   Ht 1.778 m (5' 10\")   Wt 69.9 kg (154 lb)   LMP 2016   SpO2 100%   BMI 22.10 kg/m     Estimated body mass index is 22.1 kg/m  as calculated from the following:    Height as of this encounter: 1.778 m (5' 10\").    Weight as of this encounter: 69.9 kg (154 lb).    Physical Exam  GENERAL: alert and " no distress  EYES: Eyes grossly normal to inspection, PERRL and conjunctivae and sclerae normal  HENT: ear canals and TM's normal, nose and mouth without ulcers or lesions  NECK: no adenopathy, no asymmetry, masses, or scars  RESP: lungs clear to auscultation - no rales, rhonchi or wheezes  BREAST: deferred per patient   CV: regular rate and rhythm, normal S1 S2, no S3 or S4, no murmur, click or rub, no peripheral edema  ABDOMEN: soft, nontender, no hepatosplenomegaly, no masses and bowel sounds normal  MS: no gross musculoskeletal defects noted, no edema  SKIN: no suspicious lesions or rashes  NEURO: Normal strength and tone, mentation intact and speech normal  PSYCH: mentation appears normal, affect normal/bright      Signed Electronically by: Kristen M. Kehr, PA-C

## 2024-04-20 NOTE — NURSING NOTE
"Chief Complaint   Patient presents with     Pelvic Pain     Pelvic pain per pt x 1 year now on and off no known injury     Musculoskeletal Problem     achy legs per pt x 2-3 months no known injury       Initial /79  Pulse 79  Temp 97.3  F (36.3  C) (Oral)  Ht 5' 10\" (1.778 m)  Wt 149 lb (67.6 kg)  LMP   SpO2 99%  Breastfeeding? No  BMI 21.38 kg/m2 Estimated body mass index is 21.38 kg/(m^2) as calculated from the following:    Height as of this encounter: 5' 10\" (1.778 m).    Weight as of this encounter: 149 lb (67.6 kg).  Medication Reconciliation: complete      Susannah Davalos MA    " Clear

## 2024-06-04 ENCOUNTER — VIRTUAL VISIT (OUTPATIENT)
Dept: URGENT CARE | Facility: CLINIC | Age: 59
End: 2024-06-04
Payer: COMMERCIAL

## 2024-06-04 DIAGNOSIS — L23.7 CONTACT DERMATITIS DUE TO POISON IVY: Primary | ICD-10-CM

## 2024-06-04 PROCEDURE — 99213 OFFICE O/P EST LOW 20 MIN: CPT | Mod: 95 | Performed by: EMERGENCY MEDICINE

## 2024-06-04 RX ORDER — PREDNISONE 50 MG/1
50 TABLET ORAL DAILY
Qty: 3 TABLET | Refills: 0 | Status: SHIPPED | OUTPATIENT
Start: 2024-06-04 | End: 2024-06-07

## 2024-06-04 RX ORDER — METHYLPREDNISOLONE 4 MG
TABLET, DOSE PACK ORAL
Qty: 21 TABLET | Refills: 0 | Status: SHIPPED | OUTPATIENT
Start: 2024-06-04 | End: 2024-09-16

## 2024-06-04 NOTE — PROGRESS NOTES
Video visit:  Start time: 2:29 PM  End time: 2:37 PM  Duration: 8 minutes  Patient location: At home  Provider location: [facility] virtual provider ( remote).  Platform used for video visit: Terrie          CHIEF COMPLAINT: Possible poison ivy.      HPI: Patient is a 58-year-old female who developed a rash last Thursday i.e. 6 days ago.  She believes she has come in contact with poison ivy.  She has developed blistery type rash lesions on her left forearm, left knee, and right arm.  She has been taking Benadryl at night and using calamine lotion.  She feels as if it is spreading.  No face or eye involvement.  She was camping last weekend and is suspicious she acquired poison ivy at that time.      ROS: See HPI otherwise normal.    Allergies   Allergen Reactions    Nkda [No Known Drug Allergy]       Current Outpatient Medications   Medication Sig Dispense Refill    methylPREDNISolone (MEDROL DOSEPAK) 4 MG tablet therapy pack Follow Package Directions 21 tablet 0    predniSONE (DELTASONE) 50 MG tablet Take 1 tablet (50 mg) by mouth daily for 3 days 3 tablet 0    rizatriptan (MAXALT) 10 MG tablet TAKE 1 TAB AT ONSET OF HEADACHE FOR MIGRAINE. REPEAT IN 2 HOURS IF NEEDED. MAX 3 TABLETS/24 HOURS. 18 tablet 5         PE: No acute distress.  Examination of her skin reveals classic bullous lesions on her left forearm, left knee and right arm consistent with a contact dermatitis.  No apparent cellulitic changes.  No dermatomal distribution.        TREATMENT: None.      ASSESSMENT: Contact dermatitis with no concern for complicating factors.      DIAGNOSIS: Contact dermatitis.      PLAN: Prednisone burst plus taper.  Patient is to take daily antihistamines and use calamine lotion.  To be seen if any concerns for infection or involvement of her face or eyes which was discussed with her.  Follow-up 5 to 7 days if no improvement.

## 2024-06-12 ENCOUNTER — MYC MEDICAL ADVICE (OUTPATIENT)
Dept: FAMILY MEDICINE | Facility: CLINIC | Age: 59
End: 2024-06-12
Payer: COMMERCIAL

## 2024-06-12 DIAGNOSIS — L23.7 CONTACT DERMATITIS DUE TO POISON IVY: Primary | ICD-10-CM

## 2024-06-12 RX ORDER — TRIAMCINOLONE ACETONIDE 1 MG/G
CREAM TOPICAL 2 TIMES DAILY
Qty: 30 G | Refills: 0 | Status: SHIPPED | OUTPATIENT
Start: 2024-06-12 | End: 2024-06-24

## 2024-06-20 ENCOUNTER — MYC MEDICAL ADVICE (OUTPATIENT)
Dept: FAMILY MEDICINE | Facility: CLINIC | Age: 59
End: 2024-06-20
Payer: COMMERCIAL

## 2024-06-20 DIAGNOSIS — L23.7 CONTACT DERMATITIS DUE TO POISON IVY: ICD-10-CM

## 2024-06-24 RX ORDER — TRIAMCINOLONE ACETONIDE 1 MG/G
CREAM TOPICAL 2 TIMES DAILY
Qty: 30 G | Refills: 0 | Status: SHIPPED | OUTPATIENT
Start: 2024-06-24 | End: 2024-09-16

## 2024-06-24 NOTE — TELEPHONE ENCOUNTER
Refill of the triamcinolone sent.   She should have many refills of the maxlt from the previous prescription. There were 5 refills sent in Feb.   Kristen Kehr PA-C

## 2024-06-26 ENCOUNTER — OFFICE VISIT (OUTPATIENT)
Dept: URGENT CARE | Facility: URGENT CARE | Age: 59
End: 2024-06-26
Payer: COMMERCIAL

## 2024-06-26 VITALS
RESPIRATION RATE: 15 BRPM | BODY MASS INDEX: 22.04 KG/M2 | SYSTOLIC BLOOD PRESSURE: 141 MMHG | DIASTOLIC BLOOD PRESSURE: 85 MMHG | HEART RATE: 83 BPM | WEIGHT: 153.6 LBS | OXYGEN SATURATION: 98 % | TEMPERATURE: 98.3 F

## 2024-06-26 DIAGNOSIS — L23.7 POISON IVY: Primary | ICD-10-CM

## 2024-06-26 PROCEDURE — 99214 OFFICE O/P EST MOD 30 MIN: CPT | Performed by: STUDENT IN AN ORGANIZED HEALTH CARE EDUCATION/TRAINING PROGRAM

## 2024-06-26 RX ORDER — PREDNISONE 20 MG/1
TABLET ORAL
Qty: 20 TABLET | Refills: 0 | Status: SHIPPED | OUTPATIENT
Start: 2024-06-26 | End: 2024-09-16

## 2024-06-26 RX ORDER — TRIAMCINOLONE ACETONIDE 1 MG/G
CREAM TOPICAL 2 TIMES DAILY
Qty: 453.6 G | Refills: 0 | Status: SHIPPED | OUTPATIENT
Start: 2024-06-26

## 2024-06-26 NOTE — PROGRESS NOTES
Assessment & Plan     Poison ivy  Not improving. Patient was seen and treated for poison ivy on June 4 with prednisone 50 mg for 3 days then Medrol Dosepak.  It seemed to be improving some but then started to worsen again.  She messaged her provider and started using topical steroid cream.  She used a soap for poison ivy and it seemed to get more irritated so she stopped using this.  She also has some lesions under both axilla.  I recommended doing a prednisone taper over 12 days and continuing to use the triamcinolone cream and calamine lotion to help dry out the lesions.  Advised to follow-up if symptoms persist or worsen  - triamcinolone (KENALOG) 0.1 % external cream  Dispense: 453.6 g; Refill: 0  - predniSONE (DELTASONE) 20 MG tablet  Dispense: 20 tablet; Refill: 0         No follow-ups on file.    LEANDRO Leigh [facility] MUNIRA Hunter is a 59 year old female who presents to clinic today for the following health issues:  Chief Complaint   Patient presents with    Poison Ivy     Poison ivy on left leg since memorial day. Pt was seen 6/4 and given cream and prednisone, sx never fully cleared on left leg; redness, itching, some blistering on leg. Pt feels like its spreading.        HPI      Review of Systems  Constitutional, HEENT, cardiovascular, pulmonary, gi and gu systems are negative, except as otherwise noted.      Objective    BP (!) 141/85 (BP Location: Left arm, Cuff Size: Adult Regular)   Pulse 83   Temp 98.3  F (36.8  C) (Tympanic)   Resp 15   Wt 69.7 kg (153 lb 9.6 oz)   LMP 07/22/2016   SpO2 98%   BMI 22.04 kg/m    Physical Exam   GENERAL: alert and no distress  MS: no gross musculoskeletal defects noted, no edema  SKIN: Maculopapular rash on left distal thigh with a central scabbing of the lateral patch of erythema and more macular appearance to the newer rash on the medial aspect of the left distal thigh  NEURO: Normal strength and tone,  mentation intact and speech normal  PSYCH: mentation appears normal, affect normal/bright

## 2024-07-23 ENCOUNTER — E-VISIT (OUTPATIENT)
Dept: FAMILY MEDICINE | Facility: CLINIC | Age: 59
End: 2024-07-23
Payer: COMMERCIAL

## 2024-07-23 DIAGNOSIS — L03.039 CELLULITIS OF TOE, UNSPECIFIED LATERALITY: Primary | ICD-10-CM

## 2024-07-23 PROCEDURE — 99421 OL DIG E/M SVC 5-10 MIN: CPT | Performed by: PHYSICIAN ASSISTANT

## 2024-07-23 RX ORDER — CEPHALEXIN 500 MG/1
500 CAPSULE ORAL 3 TIMES DAILY
Qty: 30 CAPSULE | Refills: 0 | Status: SHIPPED | OUTPATIENT
Start: 2024-07-23 | End: 2024-09-16

## 2024-07-23 NOTE — TELEPHONE ENCOUNTER
Action 7/23/2024 @ 1:34 pm   Action Taken 1) Called patient to ask about prior treatment/imaging. Patient has not been seen regarding her bunion/no images      REASON FOR VISIT: Bunion on right foot   DATE OF APPT: 8/20/2024   NOTES (FOR ALL VISITS) STATUS DETAILS   OFFICE NOTE from referring provider N/A    MEDICATION LIST Internal    IMAGING  (FOR ALL VISITS)     XR N/A    MRI (HEAD, NECK, SPINE) N/A    CT (HEAD, NECK, SPINE) N/A

## 2024-08-20 ENCOUNTER — PRE VISIT (OUTPATIENT)
Dept: PODIATRY | Facility: CLINIC | Age: 59
End: 2024-08-20

## 2024-08-20 ENCOUNTER — OFFICE VISIT (OUTPATIENT)
Dept: PODIATRY | Facility: CLINIC | Age: 59
End: 2024-08-20
Payer: COMMERCIAL

## 2024-08-20 ENCOUNTER — ANCILLARY PROCEDURE (OUTPATIENT)
Dept: GENERAL RADIOLOGY | Facility: CLINIC | Age: 59
End: 2024-08-20
Attending: PODIATRIST
Payer: COMMERCIAL

## 2024-08-20 DIAGNOSIS — M21.619 BUNION: Primary | ICD-10-CM

## 2024-08-20 PROCEDURE — 73630 X-RAY EXAM OF FOOT: CPT | Mod: RT | Performed by: RADIOLOGY

## 2024-08-20 PROCEDURE — 99203 OFFICE O/P NEW LOW 30 MIN: CPT | Performed by: PODIATRIST

## 2024-08-20 ASSESSMENT — PAIN SCALES - GENERAL: PAINLEVEL: MODERATE PAIN (4)

## 2024-08-20 NOTE — NURSING NOTE
Emily Fritz's chief complaint for this visit includes:  Chief Complaint   Patient presents with    Right Foot - Pain, New Patient     Possible bunion     PCP: Kehr, Kristen M    Referring Provider:  Referred Self, MD  No address on file    LMP 07/22/2016   Moderate Pain (4)     Do you need any medication refills at today's visit? NO    Allergies   Allergen Reactions    Nkda [No Known Drug Allergy]        Miguel Bullock, EMT

## 2024-08-20 NOTE — LETTER
8/20/2024      Emily Fritz  [id number] [address] [facility] [place] [id number]      Dear Colleague,    Thank you for referring your patient, Emily Fritz, to the [facility]. Please see a copy of my visit note below.    Past Medical History:   Diagnosis Date     Allergic rhinitis due to animal dander      Chronic constipation      Diagnostic skin and sensitization tests 2/13/12 skin tests pos. for: dog(+)/DM/CR     Family history of breast cancer in mother 12/10/2014     Family history of breast cancer in mother      Family history of colonic polyps 12/10/2014     Family history of colonic polyps 12/10/2014     Family history of colonic polyps      Heart palpitations 3/2009    Holter     House dust mite allergy      Lateral epicondylitis 3/28/2012     Rhinitis, allergic to other allergen      Patient Active Problem List   Diagnosis     CARDIOVASCULAR SCREENING; LDL GOAL LESS THAN 160     Cholelithiasis     Diagnostic skin and sensitization tests     Allergic rhinitis due to animal dander     Rhinitis, allergic to other allergen     House dust mite allergy     Lateral epicondylitis     Vitamin D deficiency disease     Heart palpitations     Family history of colonic polyps     Family history of breast cancer in mother     Gastroesophageal reflux disease without esophagitis     Menorrhagia with regular cycle     Dry eyes     Migraine with aura and without status migrainosus, not intractable     Past Surgical History:   Procedure Laterality Date     C/SECTION, LOW TRANSVERSE  1988     CHOLECYSTECTOMY  2010     COLONOSCOPY N/A 07/27/2021    Procedure: COLONOSCOPY, WITH POLYPECTOMY AND BIOPSY;  Surgeon: Sonia Brenner MD;  Location: [facility] OR     DILATION AND CURETTAGE, HYSTEROSCOPY DIAGNOSTIC, COMBINED  08/25/2016    Menometrorrhagia     Social History     Socioeconomic History     Marital status:      Spouse name: Roc     Number of children: 3     Years of education: 14      Highest education level: Not on file   Occupational History     Occupation:  for      Employer: [employer]   Tobacco Use     Smoking status: Former     Current packs/day: 0.00     Types: Cigarettes     Quit date: 1985     Years since quittin.6     Passive exposure: Never     Smokeless tobacco: Never     Tobacco comments:     lives in smoke free household   Vaping Use     Vaping status: Never Used   Substance and Sexual Activity     Alcohol use: Yes     Drug use: No     Sexual activity: Yes     Partners: Male     Birth control/protection: Male Surgical     Comment: vasectomy   Other Topics Concern     Parent/sibling w/ CABG, MI or angioplasty before 65F 55M? No   Social History Narrative     Not on file     Social Determinants of Health     Financial Resource Strain: Low Risk  (2024)    Financial Resource Strain      Within the past 12 months, have you or your family members you live with been unable to get utilities (heat, electricity) when it was really needed?: No   Food Insecurity: Low Risk  (2024)    Food Insecurity      Within the past 12 months, did you worry that your food would run out before you got money to buy more?: No      Within the past 12 months, did the food you bought just not last and you didn t have money to get more?: No   Transportation Needs: Low Risk  (2024)    Transportation Needs      Within the past 12 months, has lack of transportation kept you from medical appointments, getting your medicines, non-medical meetings or appointments, work, or from getting things that you need?: No   Physical Activity: Insufficiently Active (2024)    Exercise Vital Sign      Days of Exercise per Week: 2 days      Minutes of Exercise per Session: 30 min   Stress: Stress Concern Present (2024)    [employer] [employer] - Occupational Stress Questionnaire      Feeling of Stress : To some extent   Social Connections: Unknown  (2/14/2024)    Social Connection and Isolation Panel [NHANES]      Frequency of Communication with Friends and Family: Not on file      Frequency of Social Gatherings with Friends and Family: Once a week      Attends Druze Services: Not on file      Active Member of Clubs or Organizations: Not on file      Attends Club or Organization Meetings: Not on file      Marital Status: Not on file   Interpersonal Safety: Low Risk  (2/15/2024)    Interpersonal Safety      Do you feel physically and emotionally safe where you currently live?: Yes      Within the past 12 months, have you been hit, slapped, kicked or otherwise physically hurt by someone?: No      Within the past 12 months, have you been humiliated or emotionally abused in other ways by your partner or ex-partner?: No   Housing Stability: Low Risk  (2/14/2024)    Housing Stability      Do you have housing? : Yes      Are you worried about losing your housing?: No     Family History   Problem Relation Age of Onset     Diabetes Mother 78     Hypertension Mother      Thyroid Disease Mother      Cancer Mother 78        breast-mastectomy     Circulatory Mother         DVT     Breast Cancer Mother 79     Hypertension Father      Cerebrovascular Disease Father      Heart Disease Father         CHF     Hypertension Sister      Other Cancer Sister         Carcinoid Cancer     Breast Cancer Maternal Aunt         postmenopausal     Macular Degeneration Paternal Aunt      Breast Cancer Maternal Aunt         postmenopausal     Gastrointestinal Disease Sister         gastric carcinoid tumors with mets to liver     Depression Brother         Suicide 7/8/2020     Glaucoma No family hx of          Subjective pitnbnow-23-awvb-old presents for bunion on the right foot.  Relates it started bothering her maybe this summer, relates no injuries, relates she does have a sore on her hallux from a pedicure that happened in July it has been healing, it got infected and she was on  antibiotics but is not on antibiotics now, relates no specific injury, relates that hurts in the first MPJ, feels its worsened, relates no treatment for the bunion.    Objective findings- DP and PT are 2 out of 4 right.  Has dorsally contracted digits 2 through 5.  Has mild laterally deviated Hallux with dorsal first MPJ prominence and decreased range of motion of the first MPJ.  Small eschar on the dorsal medial hallux.  There is no erythema, no edema, no drainage, no odor, no calor, no pain on palpation, no pain or range of motion, no tendon voids right.  X-rays 3 views right foot reviewed with patient in clinic today with joint and cortical margins intact, elevated first metatarsal, dorsally contracted digits, joint space narrowing, subchondral sclerosis and irregularity of the first MPJ, minimal dorsal talonavicular and anterior ankle joint spurring noted.    Assessment and plan- Bunion with functional Hallux Limitus right.  Diagnosis and treatment options discussed with the patient.  Patient is advised on stretching and shoe gear.  Patient is advised on Spenco over-the-counter insoles.  Prescription for Voltaren gel given use discussed with the patient.  X-rays ordered, reviewed and use discussed with the patient.  Dispensed wound Vashe for the eschar on the right Hallux.  Return to clinic and see me as needed or if she wants further treatment options.                                                Low level of medical decision making.      Again, thank you for allowing me to participate in the care of your patient.        Sincerely,        Bunny Ochoa DPM

## 2024-08-20 NOTE — PROGRESS NOTES
Past Medical History:   Diagnosis Date    Allergic rhinitis due to animal dander     Chronic constipation     Diagnostic skin and sensitization tests 12 skin tests pos. for: dog(+)/DM/CR    Family history of breast cancer in mother 12/10/2014    Family history of breast cancer in mother     Family history of colonic polyps 12/10/2014    Family history of colonic polyps 12/10/2014    Family history of colonic polyps     Heart palpitations 3/2009    [place]    House dust mite allergy     Lateral epicondylitis 3/28/2012    Rhinitis, allergic to other allergen      Patient Active Problem List   Diagnosis    CARDIOVASCULAR SCREENING; LDL GOAL LESS THAN 160    Cholelithiasis    Diagnostic skin and sensitization tests    Allergic rhinitis due to animal dander    Rhinitis, allergic to other allergen    House dust mite allergy    Lateral epicondylitis    Vitamin D deficiency disease    Heart palpitations    Family history of colonic polyps    Family history of breast cancer in mother    Gastroesophageal reflux disease without esophagitis    Menorrhagia with regular cycle    Dry eyes    Migraine with aura and without status migrainosus, not intractable     Past Surgical History:   Procedure Laterality Date    C/SECTION, LOW TRANSVERSE      CHOLECYSTECTOMY      COLONOSCOPY N/A 2021    Procedure: COLONOSCOPY, WITH POLYPECTOMY AND BIOPSY;  Surgeon: Sonia Brenner MD;  Location: [facility] OR    DILATION AND CURETTAGE, HYSTEROSCOPY DIAGNOSTIC, COMBINED  2016    Menometrorrhagia     Social History     Socioeconomic History    Marital status:      Spouse name: Roc    Number of children: 3    Years of education: 14    Highest education level: Not on file   Occupational History    Occupation:  for      Employer: [employer]   Tobacco Use    Smoking status: Former     Current packs/day: 0.00     Types: Cigarettes     Quit date: 1985     Years since quittin.6      Passive exposure: Never    Smokeless tobacco: Never    Tobacco comments:     lives in smoke free household   Vaping Use    Vaping status: Never Used   Substance and Sexual Activity    Alcohol use: Yes    Drug use: No    Sexual activity: Yes     Partners: Male     Birth control/protection: Male Surgical     Comment: vasectomy   Other Topics Concern    Parent/sibling w/ CABG, MI or angioplasty before 65F 55M? No   Social History Narrative    Not on file     Social Determinants of Health     Financial Resource Strain: Low Risk  (2/14/2024)    Financial Resource Strain     Within the past 12 months, have you or your family members you live with been unable to get utilities (heat, electricity) when it was really needed?: No   Food Insecurity: Low Risk  (2/14/2024)    Food Insecurity     Within the past 12 months, did you worry that your food would run out before you got money to buy more?: No     Within the past 12 months, did the food you bought just not last and you didn t have money to get more?: No   Transportation Needs: Low Risk  (2/14/2024)    Transportation Needs     Within the past 12 months, has lack of transportation kept you from medical appointments, getting your medicines, non-medical meetings or appointments, work, or from getting things that you need?: No   Physical Activity: Insufficiently Active (2/14/2024)    Exercise Vital Sign     Days of Exercise per Week: 2 days     Minutes of Exercise per Session: 30 min   Stress: Stress Concern Present (2/14/2024)    [employer] - Occupational Stress Questionnaire     Feeling of Stress : To some extent   Social Connections: Unknown (2/14/2024)    Social Connection and Isolation Panel [NHANES]     Frequency of Communication with Friends and Family: Not on file     Frequency of Social Gatherings with Friends and Family: Once a week     Attends Zoroastrianism Services: Not on file     Active Member of Clubs or Organizations: Not on file      Attends Club or Organization Meetings: Not on file     Marital Status: Not on file   Interpersonal Safety: Low Risk  (2/15/2024)    Interpersonal Safety     Do you feel physically and emotionally safe where you currently live?: Yes     Within the past 12 months, have you been hit, slapped, kicked or otherwise physically hurt by someone?: No     Within the past 12 months, have you been humiliated or emotionally abused in other ways by your partner or ex-partner?: No   Housing Stability: Low Risk  (2/14/2024)    Housing Stability     Do you have housing? : Yes     Are you worried about losing your housing?: No     Family History   Problem Relation Age of Onset    Diabetes Mother 78    Hypertension Mother     Thyroid Disease Mother     Cancer Mother 78        breast-mastectomy    Circulatory Mother         DVT    Breast Cancer Mother 79    Hypertension Father     Cerebrovascular Disease Father     Heart Disease Father         CHF    Hypertension Sister     Other Cancer Sister         Carcinoid Cancer    Breast Cancer Maternal Aunt         postmenopausal    Macular Degeneration Paternal Aunt     Breast Cancer Maternal Aunt         postmenopausal    Gastrointestinal Disease Sister         gastric carcinoid tumors with mets to liver    Depression Brother         Suicide 7/8/2020    Glaucoma No family hx of          Subjective [id number]-old presents for bunion on the right foot.  Relates it started bothering her maybe this summer, relates no injuries, relates she does have a sore on her hallux from a pedicure that happened in July it has been healing, it got infected and she was on antibiotics but is not on antibiotics now, relates no specific injury, relates that hurts in the first MPJ, feels its worsened, relates no treatment for the bunion.    Objective findings- DP and PT are 2 out of 4 right.  Has dorsally contracted digits 2 through 5.  Has mild laterally deviated Hallux with dorsal first MPJ prominence and  decreased range of motion of the first MPJ.  Small eschar on the dorsal medial hallux.  There is no erythema, no edema, no drainage, no odor, no calor, no pain on palpation, no pain or range of motion, no tendon voids right.  X-rays 3 views right foot reviewed with patient in clinic today with joint and cortical margins intact, elevated first metatarsal, dorsally contracted digits, joint space narrowing, subchondral sclerosis and irregularity of the first MPJ, minimal dorsal talonavicular and anterior ankle joint spurring noted.    Assessment and plan- Bunion with functional Hallux Limitus right.  Diagnosis and treatment options discussed with the patient.  Patient is advised on stretching and shoe gear.  Patient is advised on Spenco over-the-counter insoles.  Prescription for Voltaren gel given use discussed with the patient.  X-rays ordered, reviewed and use discussed with the patient.  Dispensed wound Vashe for the eschar on the right Hallux.  Return to clinic and see me as needed or if she wants further treatment options.        9/3/2024-patient request referral for surgical options.  Referral to orthopedic surgery order given.                                        Low level of medical decision making.

## 2024-09-03 ENCOUNTER — MYC MEDICAL ADVICE (OUTPATIENT)
Dept: PODIATRY | Facility: CLINIC | Age: 59
End: 2024-09-03
Payer: COMMERCIAL

## 2024-09-16 ENCOUNTER — VIRTUAL VISIT (OUTPATIENT)
Dept: FAMILY MEDICINE | Facility: CLINIC | Age: 59
End: 2024-09-16
Payer: COMMERCIAL

## 2024-09-16 DIAGNOSIS — G47.00 INSOMNIA, UNSPECIFIED TYPE: Primary | ICD-10-CM

## 2024-09-16 PROCEDURE — 99213 OFFICE O/P EST LOW 20 MIN: CPT | Mod: 95 | Performed by: PHYSICIAN ASSISTANT

## 2024-09-16 NOTE — PROGRESS NOTES
Elsa is a 59 year old who is being evaluated via a billable video visit.    How would you like to obtain your AVS? MyChart  If the video visit is dropped, the invitation should be resent by: Send to e-mail at: [email or web address]  Will anyone else be joining your video visit? No      Assessment & Plan     Insomnia, unspecified type  Discussed risks, benefits and side effects associated with HRT.   I am not convinced that her symptoms are related to menopause.   With increased CV risk in her age along with increase in migraine risk, I don't think this is the appropriate treatment.   Treating the insomnia will help her with rest and brain fog.   She defers prescription medication. She takes over the counter medication with relief an will start using this regularly.   Return to the clinic if symptoms persist, consider further lab tests.                 Subjective   Elsa is a 59 year old, presenting for the following health issues:  hormones replacement (Discuss hormones replacement. )    Elsa was working from home today and had the television on in the background. There was a program about hormone replacement and she is inquiring.   She has been menopausal since age 50.   She no longer has night sweats or vasomotor symptoms.   She does have some brain fog, difficulty with sleep and using over the counter medication with relief. She also feels achy in joints and muscles.   She realized these symptoms are associated with menopause and interested in HRT           9/16/2024    12:17 PM   Additional Questions   Roomed by Juan C RIVERA MA     History of Present Illness       Reason for visit:  Discuss estrogen replacement   She is taking medications regularly.               Review of Systems  Constitutional, HEENT, cardiovascular, pulmonary, GI, , musculoskeletal, neuro, skin, endocrine and psych systems are negative, except as otherwise noted.      Objective           Vitals:  No vitals were obtained today due to virtual  visit.    Physical Exam   GENERAL: alert and no distress  EYES: Eyes grossly normal to inspection.  No discharge or erythema, or obvious scleral/conjunctival abnormalities.  RESP: No audible wheeze, cough, or visible cyanosis.    SKIN: Visible skin clear. No significant rash, abnormal pigmentation or lesions.  NEURO: Cranial nerves grossly intact.  Mentation and speech appropriate for age.  PSYCH: Appropriate affect, tone, and pace of words          Video-Visit Details    Type of service:  Video Visit   Originating Location (pt. Location): Home    Distant Location (provider location):  On-site  Platform used for Video Visit: Michael  Signed Electronically by: Kristen M. Kehr, PA-C

## 2024-09-23 NOTE — TELEPHONE ENCOUNTER
DIAGNOSIS: Bunion   APPOINTMENT DATE: 10/01/2024   NOTES STATUS DETAILS   OFFICE NOTE from referring provider Internal 08/20/2024 - Bunny Ochoa DPM -  [facility]   XRAYS (IMAGES & REPORTS) Internal 08/20/2024 - [facility]

## 2024-10-01 ENCOUNTER — OFFICE VISIT (OUTPATIENT)
Dept: ORTHOPEDICS | Facility: CLINIC | Age: 59
End: 2024-10-01
Attending: PODIATRIST
Payer: COMMERCIAL

## 2024-10-01 ENCOUNTER — PRE VISIT (OUTPATIENT)
Dept: ORTHOPEDICS | Facility: CLINIC | Age: 59
End: 2024-10-01

## 2024-10-01 VITALS — WEIGHT: 150 LBS | BODY MASS INDEX: 21.47 KG/M2 | HEIGHT: 70 IN

## 2024-10-01 DIAGNOSIS — M19.079 ARTHRITIS OF FIRST MTP JOINT: Primary | ICD-10-CM

## 2024-10-01 PROCEDURE — 99203 OFFICE O/P NEW LOW 30 MIN: CPT | Performed by: ORTHOPAEDIC SURGERY

## 2024-10-01 NOTE — NURSING NOTE
"Reason For Visit:   Chief Complaint   Patient presents with    Consult     Right bunion, discuss when surgery may be needed. Has seen Dr Ochoa.        Ht 1.778 m (5' 10\")   Wt 68 kg (150 lb)   LMP 07/22/2016 (Approximate)   BMI 21.52 kg/m           Abigail Rosales ATC    "

## 2024-10-01 NOTE — PROGRESS NOTES
Chief complaint: Right first MTP joint pain    Patient is a 59-year-old female who presents today for evaluation of her right great toe.  Patient has been encouraged to visit with us by Dr. Ochoa.    Patient reports to have pain and discomfort across the first MTP joint.  She will reports to have pain approximately once a week.  Patient has not had any formal treatment for this.  Reports to have more pain walking barefoot then with shoes.  Patient has been told to have a bunion deformity and presents today for discussion of treatment options.    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  Denies to having problems on the opposite foot.    We reviewed today her past medical and surgical history current medications and drug allergies.    Into the visit she presents a very pleasant female in nonapparent distress with a weight of 150 pounds.  Denies to have any constitutional symptoms.    On today's visit she presents with full range of motion of the right ankle hindfoot and midfoot joints.  Skin is intact skin is intact.  There is limited motion across the first MTP joint with no more than 30 degrees of combined plantar and dorsiflexion.  There is no valgus alignment and there is some painful osteophytes dorsally across the first MTP joint.    Plain x-rays of the right foot were reviewed today which are significant for showing some joint space narrowing with a early arthritic changes alignment of the great toe is excellent.    Assessment: Right first MTP joint arthritis    Plan: I discussed with the patient the current condition of her a great toe.  She seems to be quite far away from any consideration for surgical treatment.  Also discussed with her that on today's visit we would recommend her to use steel shank plates to immobilize the toe and increase her walking distance without any discomfort.    We also discussed with the patient that the next step will be to  consider a corticosteroid injection into the right first MTP joint which will be done at her discretion.    I also emphasized importance that her pain is not creating any further damage to the foot or the great toe.    All questions were answered.  Patient was present discussion.  Patient will follow-up on as needed basis.    TT 30 minutes

## 2024-10-01 NOTE — LETTER
10/1/2024      Emily Fritz  [address] [address] [place] [address]      Dear Colleague,    Thank you for referring your patient, Emily Fritz, to the [facility] [place]. Please see a copy of my visit note below.    Chief complaint: Right first MTP joint pain    Patient is a 59-year-old female who presents today for evaluation of her right great toe.  Patient has been encouraged to visit with us by Dr. Ochoa.    Patient reports to have pain and discomfort across the first MTP joint.  She will reports to have pain approximately once a week.  Patient has not had any formal treatment for this.  Reports to have more pain walking barefoot then with shoes.  Patient has been told to have a bunion deformity and presents today for discussion of treatment options.    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  Denies to having problems on the opposite foot.    We reviewed today her past medical and surgical history current medications and drug allergies.    Into the visit she presents a very pleasant female in nonapparent distress with a weight of 150 pounds.  Denies to have any constitutional symptoms.    On today's visit she presents with full range of motion of the right ankle hindfoot and midfoot joints.  Skin is intact skin is intact.  There is limited motion across the first MTP joint with no more than 30 degrees of combined plantar and dorsiflexion.  There is no valgus alignment and there is some painful osteophytes dorsally across the first MTP joint.    Plain x-rays of the right foot were reviewed today which are significant for showing some joint space narrowing with a early arthritic changes alignment of the great toe is excellent.    Assessment: Right first MTP joint arthritis    Plan: I discussed with the patient the current condition of her a great toe.  She seems to be quite far away from any consideration for  surgical treatment.  Also discussed with her that on today's visit we would recommend her to use steel shank plates to immobilize the toe and increase her walking distance without any discomfort.    We also discussed with the patient that the next step will be to consider a corticosteroid injection into the right first MTP joint which will be done at her discretion.    I also emphasized importance that her pain is not creating any further damage to the foot or the great toe.    All questions were answered.  Patient was present discussion.  Patient will follow-up on as needed basis.    TT 30 minutes      Again, thank you for allowing me to participate in the care of your patient.        Sincerely,        Jose C Guallpa MD

## 2024-10-14 ENCOUNTER — ANCILLARY PROCEDURE (OUTPATIENT)
Dept: GENERAL RADIOLOGY | Facility: CLINIC | Age: 59
End: 2024-10-14
Attending: PEDIATRICS
Payer: COMMERCIAL

## 2024-10-14 ENCOUNTER — OFFICE VISIT (OUTPATIENT)
Dept: ORTHOPEDICS | Facility: CLINIC | Age: 59
End: 2024-10-14
Payer: COMMERCIAL

## 2024-10-14 VITALS — HEIGHT: 70 IN | WEIGHT: 150 LBS | BODY MASS INDEX: 21.47 KG/M2

## 2024-10-14 DIAGNOSIS — M25.512 ACUTE PAIN OF LEFT SHOULDER: ICD-10-CM

## 2024-10-14 DIAGNOSIS — M75.102 ROTATOR CUFF SYNDROME OF LEFT SHOULDER: Primary | ICD-10-CM

## 2024-10-14 PROCEDURE — 99203 OFFICE O/P NEW LOW 30 MIN: CPT | Mod: 25 | Performed by: PEDIATRICS

## 2024-10-14 PROCEDURE — 73030 X-RAY EXAM OF SHOULDER: CPT | Mod: TC | Performed by: INTERNAL MEDICINE

## 2024-10-14 PROCEDURE — 20610 DRAIN/INJ JOINT/BURSA W/O US: CPT | Mod: LT | Performed by: PEDIATRICS

## 2024-10-14 RX ORDER — TRIAMCINOLONE ACETONIDE 40 MG/ML
40 INJECTION, SUSPENSION INTRA-ARTICULAR; INTRAMUSCULAR
Status: COMPLETED | OUTPATIENT
Start: 2024-10-14 | End: 2024-10-14

## 2024-10-14 RX ORDER — LIDOCAINE HYDROCHLORIDE 10 MG/ML
2 INJECTION, SOLUTION INFILTRATION; PERINEURAL
Status: COMPLETED | OUTPATIENT
Start: 2024-10-14 | End: 2024-10-14

## 2024-10-14 RX ADMIN — TRIAMCINOLONE ACETONIDE 40 MG: 40 INJECTION, SUSPENSION INTRA-ARTICULAR; INTRAMUSCULAR at 12:25

## 2024-10-14 RX ADMIN — LIDOCAINE HYDROCHLORIDE 2 ML: 10 INJECTION, SOLUTION INFILTRATION; PERINEURAL at 12:25

## 2024-10-14 NOTE — PROGRESS NOTES
ASSESSMENT & PLAN    Elsa was seen today for pain.    Diagnoses and all orders for this visit:    Rotator cuff syndrome of left shoulder  -     Physical Therapy  Referral; Future  -     Large Joint Injection/Arthocentesis: L subacromial bursa    Acute pain of left shoulder  -     XR Shoulder Left G/E 3 Views; Future  -     Physical Therapy  Referral; Future  -     Large Joint Injection/Arthocentesis: L subacromial bursa          See Patient Instructions  Patient Instructions   Reviewed nature of left shoulder pain after straining, consistent with rotator cuff source.  Overall function fairly reassuring today, tear not likely clinically.  With ongoing symptoms, discussed considerations around physical therapy, trial of steroid injection, also potential for additional imaging with MRI.  Following discussion, left shoulder subacromial steroid injection done today with ongoing symptoms.  Also placed referral to physical therapy, you can start uncomfortable doing so.  From there, plan to monitor 1 month to begin with PT exercises.  If improving, follow-up is open-ended.  Otherwise, contact me for other questions or concerns.  Would consider additional imaging with MRI if not improving.    If you have any further questions for your physician or physician s care team you can contact them thru [employer] or by calling [phone].          Injection Discharge Instructions    You may shower, however avoid swimming, tub baths or hot tubs for 24 hours following your procedure  You may have a mild to moderate increase in pain for several days following the injection.  It may take up to 14 days for the steroid medication to start working although you may feel the effect as early as a few days after the procedure.  You may use ice packs for 10-15 minutes, 3 to 4 times a day at the injection site for comfort  You may use anti-inflammatory medications (such as Ibuprofen or Aleve or Advil) if you are able to take  "safely, or acetaminophen (Tylenol) for pain control if necessary  If you were fasting, you may resume your normal diet and medications after the procedure  If you have diabetes, check your blood sugar more frequently than usual as your blood sugar may be higher than normal for 10-14 days following a steroid injection. Contact your doctor who manages your diabetes if your blood sugar is higher than usual    If you experience any of the following, call the clinic @ [phone]  - Fever over 100 degrees F  - Swelling, bleeding, redness, drainage, warmth at the injection site  - New or worsening pain      Richie Aguilar DO  [facility]    -----  Chief Complaint   Patient presents with    Left Shoulder - Pain       SUBJECTIVE  Emily Fritz is a/an 59 year old female who is seen as a self referral for evaluation of left shoulder.     The patient is seen by themselves.  The patient is Right handed    Onset: 2.5 month(s) ago. Patient describes injury as reaching behind in the passenger side of a car to reach for the window on the drivers side, diagonally posterior. Does note that the pain is not as bad  Location of Pain: left shoulder, diffuse shoulder pain, points anterior mostly, and radiating to upper arm  Worsened by: Pulling a jacket on, sleeping on the side  Better with:   Treatments tried: ibuprofen, chiropractic, anti-inflammatory medication cream  Associated symptoms: no distal numbness or tingling; denies swelling or warmth    Orthopedic/Surgical history: NO  Social History/Occupation: Desk work, likes to quilt    **  Above information per rooming staff.  Additional history:  Did not really have much pain right away, but knew when reached should not have reached that far.  Pain more anterior left shoulder, into upper arm.  Pain can be intermittent with motions.  No joint noise.        REVIEW OF SYSTEMS:  Review of Systems    OBJECTIVE:  Ht 1.778 m (5' 10\")   Wt 68 " kg (150 lb)   LMP 07/22/2016 (Approximate)   BMI 21.52 kg/m           Left Shoulder exam    ROM:      forward flexion grossly symmetric, ~160, mild discomfort but minimal change        abduction ~90, pain       internal rotation 4-5 vertebral segments lower than right, still to mid lumbar, some pain       external rotation grossly symmetric, mild tight sensation     Strength:      abduction 5-/5       internal rotation 5-/5       external rotation 4+/5  No pain with testing    Impingement testing:      some pain with Akbar       Pain with empty can    Speed neg  Yergason neg      Skin:      no visible deformities       well perfused       capillary refill brisk    Sensation:      normal sensation over shoulder and upper extremity       RADIOLOGY:  Final results and radiologist's interpretation, available in the [facility] health record.  Images were reviewed with the patient in the office today.  My personal interpretation of the performed imaging: small humeral head spur, but with preserved GH joint space. No acute bony abnormality noted.      Recent Results (from the past 24 hour(s))   XR Shoulder Left G/E 3 Views    Narrative    SHOULDER LEFT THREE OR MORE VIEWS October 14, 2024 11:47 AM     HISTORY: Suspect rotator cuff tear source, rule out arthritis. Acute  pain of left shoulder.    COMPARISON: None.      Impression    IMPRESSION: No acute fracture or subluxation. Mild glenohumeral joint  osteoarthritis.         Large Joint Injection/Arthocentesis: L subacromial bursa    Date/Time: 10/14/2024 12:25 PM    Performed by: Richie Aguilar DO  Authorized by: Richie Aguilar DO    Indications:  Pain  Needle Size:  25 G  Guidance: landmark guided    Approach:  Posterolateral  Location:  Shoulder      Site:  L subacromial bursa  Medications:  2 mL lidocaine 1 %; 40 mg triamcinolone 40 MG/ML  Outcome:  Tolerated well, no immediate complications  Procedure discussed: discussed risks, benefits, and  alternatives    Consent Given by:  Patient  Timeout: timeout called immediately prior to procedure    Prep: patient was prepped and draped in usual sterile fashion

## 2024-10-14 NOTE — PATIENT INSTRUCTIONS
Reviewed nature of left shoulder pain after straining, consistent with rotator cuff source.  Overall function fairly reassuring today, tear not likely clinically.  With ongoing symptoms, discussed considerations around physical therapy, trial of steroid injection, also potential for additional imaging with MRI.  Following discussion, left shoulder subacromial steroid injection done today with ongoing symptoms.  Also placed referral to physical therapy, you can start uncomfortable doing so.  From there, plan to monitor 1 month to begin with PT exercises.  If improving, follow-up is open-ended.  Otherwise, contact me for other questions or concerns.  Would consider additional imaging with MRI if not improving.    If you have any further questions for your physician or physician s care team you can contact them thru [employer] or by calling [phone].          Injection Discharge Instructions    You may shower, however avoid swimming, tub baths or hot tubs for 24 hours following your procedure  You may have a mild to moderate increase in pain for several days following the injection.  It may take up to 14 days for the steroid medication to start working although you may feel the effect as early as a few days after the procedure.  You may use ice packs for 10-15 minutes, 3 to 4 times a day at the injection site for comfort  You may use anti-inflammatory medications (such as Ibuprofen or Aleve or Advil) if you are able to take safely, or acetaminophen (Tylenol) for pain control if necessary  If you were fasting, you may resume your normal diet and medications after the procedure  If you have diabetes, check your blood sugar more frequently than usual as your blood sugar may be higher than normal for 10-14 days following a steroid injection. Contact your doctor who manages your diabetes if your blood sugar is higher than usual    If you experience any of the following, call the clinic @ [phone]  - Fever over 100  degrees F  - Swelling, bleeding, redness, drainage, warmth at the injection site  - New or worsening pain

## 2024-10-14 NOTE — LETTER
10/14/2024      Emily Fritz  [address] [facility] [address]      Dear Colleague,    Thank you for referring your patient, Emily Fritz, to the [facility]. Please see a copy of my visit note below.    ASSESSMENT & PLAN    Elsa was seen today for pain.    Diagnoses and all orders for this visit:    Rotator cuff syndrome of left shoulder  -     Physical Therapy  Referral; Future  -     Large Joint Injection/Arthocentesis: L subacromial bursa    Acute pain of left shoulder  -     XR Shoulder Left G/E 3 Views; Future  -     Physical Therapy  Referral; Future  -     Large Joint Injection/Arthocentesis: L subacromial bursa          See Patient Instructions  Patient Instructions   Reviewed nature of left shoulder pain after straining, consistent with rotator cuff source.  Overall function fairly reassuring today, tear not likely clinically.  With ongoing symptoms, discussed considerations around physical therapy, trial of steroid injection, also potential for additional imaging with MRI.  Following discussion, left shoulder subacromial steroid injection done today with ongoing symptoms.  Also placed referral to physical therapy, you can start uncomfortable doing so.  From there, plan to monitor 1 month to begin with PT exercises.  If improving, follow-up is open-ended.  Otherwise, contact me for other questions or concerns.  Would consider additional imaging with MRI if not improving.    If you have any further questions for your physician or physician s care team you can contact them thru [employer] or by calling [phone].          Injection Discharge Instructions    You may shower, however avoid swimming, tub baths or hot tubs for 24 hours following your procedure  You may have a mild to moderate increase in pain for several days following the injection.  It may take up to 14 days for the steroid medication to start working although you may  feel the effect as early as a few days after the procedure.  You may use ice packs for 10-15 minutes, 3 to 4 times a day at the injection site for comfort  You may use anti-inflammatory medications (such as Ibuprofen or Aleve or Advil) if you are able to take safely, or acetaminophen (Tylenol) for pain control if necessary  If you were fasting, you may resume your normal diet and medications after the procedure  If you have diabetes, check your blood sugar more frequently than usual as your blood sugar may be higher than normal for 10-14 days following a steroid injection. Contact your doctor who manages your diabetes if your blood sugar is higher than usual    If you experience any of the following, call the clinic @ [phone]  - Fever over 100 degrees F  - Swelling, bleeding, redness, drainage, warmth at the injection site  - New or worsening pain      Richie Aguilar [facility]    -----  Chief Complaint   Patient presents with     Left Shoulder - Pain       SUBJECTIVE  Emily Fritz is a/an 59 year old female who is seen as a self referral for evaluation of left shoulder.     The patient is seen by themselves.  The patient is Right handed    Onset: 2.5 month(s) ago. Patient describes injury as reaching behind in the passenger side of a car to reach for the window on the drivers side, diagonally posterior. Does note that the pain is not as bad  Location of Pain: left shoulder, diffuse shoulder pain, points anterior mostly, and radiating to upper arm  Worsened by: Pulling a jacket on, sleeping on the side  Better with:   Treatments tried: ibuprofen, chiropractic, anti-inflammatory medication cream  Associated symptoms: no distal numbness or tingling; denies swelling or warmth    Orthopedic/Surgical history: NO  Social History/Occupation: Desk work, likes to quilt    **  Above information per rooming staff.  Additional history:  Did not really have much pain  "right away, but knew when reached should not have reached that far.  Pain more anterior left shoulder, into upper arm.  Pain can be intermittent with motions.  No joint noise.        REVIEW OF SYSTEMS:  Review of Systems    OBJECTIVE:  Ht 1.778 m (5' 10\")   Wt 68 kg (150 lb)   LMP 07/22/2016 (Approximate)   BMI 21.52 kg/m           Left Shoulder exam    ROM:      forward flexion grossly symmetric, ~160, mild discomfort but minimal change        abduction ~90, pain       internal rotation 4-5 vertebral segments lower than right, still to mid lumbar, some pain       external rotation grossly symmetric, mild tight sensation     Strength:      abduction 5-/5       internal rotation 5-/5       external rotation 4+/5  No pain with testing    Impingement testing:      some pain with Akbar       Pain with empty can    Speed neg  Yergason neg      Skin:      no visible deformities       well perfused       capillary refill brisk    Sensation:      normal sensation over shoulder and upper extremity       RADIOLOGY:  Final results and radiologist's interpretation, available in the [facility] health record.  Images were reviewed with the patient in the office today.  My personal interpretation of the performed imaging: small humeral head spur, but with preserved GH joint space. No acute bony abnormality noted.      Recent Results (from the past 24 hour(s))   XR Shoulder Left G/E 3 Views    Narrative    SHOULDER LEFT THREE OR MORE VIEWS October 14, 2024 11:47 AM     HISTORY: Suspect rotator cuff tear source, rule out arthritis. Acute  pain of left shoulder.    COMPARISON: None.      Impression    IMPRESSION: No acute fracture or subluxation. Mild glenohumeral joint  osteoarthritis.         Large Joint Injection/Arthocentesis: L subacromial bursa    Date/Time: 10/14/2024 12:25 PM    Performed by: Richie Aguilar DO  Authorized by: Richie Aguilar DO    Indications:  Pain  Needle Size:  25 G  Guidance: landmark guided "    Approach:  Posterolateral  Location:  Shoulder      Site:  L subacromial bursa  Medications:  2 mL lidocaine 1 %; 40 mg triamcinolone 40 MG/ML  Outcome:  Tolerated well, no immediate complications  Procedure discussed: discussed risks, benefits, and alternatives    Consent Given by:  Patient  Timeout: timeout called immediately prior to procedure    Prep: patient was prepped and draped in usual sterile fashion                  Again, thank you for allowing me to participate in the care of your patient.        Sincerely,        Richie Aguilar DO

## 2024-11-06 ASSESSMENT — ACTIVITIES OF DAILY LIVING (ADL)
PUSHING_WITH_THE_INVOLVED_ARM: 0
REACHING_FOR_SOMETHING_ON_A_HIGH_SHELF: 4
PUTTING_ON_A_SHIRT_THAT_BUTTONS_DOWN_THE_FRONT: 3
WHEN_LYING_ON_THE_INVOLVED_SIDE: 4
WASHING_YOUR_HAIR?: 1
AT_ITS_WORST?: 8
PLACING_AN_OBJECT_ON_A_HIGH_SHELF: 0
CARRYING_A_HEAVY_OBJECT_OF_10_POUNDS: 3
PLEASE_INDICATE_YOR_PRIMARY_REASON_FOR_REFERRAL_TO_THERAPY:: SHOULDER
TOUCHING_THE_BACK_OF_YOUR_NECK: 2
WASHING_YOUR_BACK: 4
PUTTING_ON_YOUR_PANTS: 0

## 2024-11-11 ENCOUNTER — THERAPY VISIT (OUTPATIENT)
Dept: PHYSICAL THERAPY | Facility: CLINIC | Age: 59
End: 2024-11-11
Payer: COMMERCIAL

## 2024-11-11 DIAGNOSIS — M75.102 ROTATOR CUFF SYNDROME OF LEFT SHOULDER: ICD-10-CM

## 2024-11-11 DIAGNOSIS — M25.512 ACUTE PAIN OF LEFT SHOULDER: ICD-10-CM

## 2024-11-11 PROCEDURE — 97110 THERAPEUTIC EXERCISES: CPT | Mod: GP

## 2024-11-11 PROCEDURE — 97161 PT EVAL LOW COMPLEX 20 MIN: CPT | Mod: GP

## 2024-11-11 NOTE — PROGRESS NOTES
PHYSICAL THERAPY EVALUATION  Type of Visit: Evaluation       Fall Risk Screen:  Fall screen completed by: PT  Have you fallen 2 or more times in the past year?: No  Have you fallen and had an injury in the past year?: No  Is patient a fall risk?: No    Subjective         Presenting condition or subjective complaint:    Date of onset: 11/14/24    Relevant medical history:     Dates & types of surgery: (Proxy-Rptd) c section and gallblader    58 y/o female presents to physical therapy with chief complaint of left shoulder pain. Had injection. Shoulder is better but it still grabs her when she is reaching back to put her coat on or when she is quilting and having to maneuver the quilt. She is able to sleep better after the injection. Denies radicular symptoms.     Prior diagnostic imaging/testing results: (Proxy-Rptd) X-ray     Prior therapy history for the same diagnosis, illness or injury: (Proxy-Rptd) No      Prior Level of Function  Transfers:   Ambulation:   ADL:   IADL:     Living Environment  Social support: (Proxy-Rptd) With a significant other or spouse   Type of home: (Proxy-Rptd) House; Multi-level   Stairs to enter the home: (Proxy-Rptd) Yes       Ramp: (Proxy-Rptd) No   Stairs inside the home: (Proxy-Rptd) Yes (Proxy-Rptd) 21 Is there a railing: (Proxy-Rptd) Yes     Help at home: (Proxy-Rptd) Self Cares (home health aide/personal care attendant, family, etc)  Equipment owned:       Employment: (Proxy-Rptd) Yes (Proxy-Rptd)   Hobbies/Interests:      Patient goals for therapy:      Pain assessment:      Objective   SHOULDER EVALUATION  PAIN: Pain Level at Rest: 1/10  Pain Level with Use: 7/10  Pain Location: shoulder  Pain Quality: something ripping in there, tight,   Pain Frequency: intermittent  Pain is Exacerbated By: reaching behind her to dress, heavy IR when quilting  Pain is Relieved By: injection  Pain Progression: Improved  INTEGUMENTARY (edema, incisions):   POSTURE: Sitting Posture:  Rounded shoulders, Forward head  GAIT:   Weightbearing Status:   Assistive Device(s):   Gait Deviations:   BALANCE/PROPRIOCEPTION:   WEIGHTBEARING ALIGNMENT:   ROM:   (Degrees) Left AROM Left PROM Right AROM  Right PROM   Shoulder Flexion 129      Shoulder Extension 60      Shoulder Abduction Pain arc at 90deg, able to achieve 115       Shoulder Adduction       Shoulder Internal Rotation Mid low back  Lower thoracic     Shoulder External Rotation       Shoulder Horizontal Abduction       Shoulder Horizontal Adduction       Shoulder Flexion ER       Shoulder Flexion IR       Elbow Extension       Elbow Flexion       Pain:   End feel:     STRENGTH:   Pain: - none + mild ++ moderate +++ severe  Strength Scale: 0-5/5 Left Right   Shoulder Flexion 4+ 5   Shoulder Extension 5 5   Shoulder Abduction 4 5   Shoulder Adduction     Shoulder Internal Rotation 5 5   Shoulder External Rotation 5 5   Shoulder Horizontal Abduction     Shoulder Horizontal Adduction     Elbow Flexion 5 5   Elbow Extension 5 5   Mid Trap     Lower Trap     Rhomboid     Serratus Anterior       FLEXIBILITY:   SPECIAL TESTS:    Left Right   Impingement     Neer's     Hawkin's-Terrell     Coracoid Impingement     Internal impingement     Labral     Anterior Slide     Schoolcraft's Negative     Crank     Instability     Apprehension (anterior)     Relocation (anterior)     Anterior Load & Shift     Posterior Load & Shift     Posterior instability (with 90 degrees flex)     Multi-Directional Instability      Sulcus     Biceps      Speed's Positive    Rotator Cuff Tear     Empty Can  Negative     Belly Press Negative     Lift off  Negative       PALPATION:   JOINT MOBILITY: WNL  CERVICAL SCREEN: WNL    Assessment & Plan   CLINICAL IMPRESSIONS  Medical Diagnosis: Acute pain of left shoulder  Rotator cuff syndrome of left shoulder    Treatment Diagnosis: left shoulder pain   Impression/Assessment: Patient is a 59 year old female with left shoulder complaints.  The  following significant findings have been identified: Pain, Decreased ROM/flexibility, Decreased strength, Impaired muscle performance, Decreased activity tolerance, and Impaired posture. These impairments interfere with their ability to perform self care tasks, recreational activities, household chores, and driving  as compared to previous level of function.     Clinical Decision Making (Complexity):  Clinical Presentation: Stable/Uncomplicated  Clinical Presentation Rationale: based on medical and personal factors listed in PT evaluation  Clinical Decision Making (Complexity): Low complexity    PLAN OF CARE  Treatment Interventions:  Modalities: Cryotherapy, E-stim, Hot Pack  Interventions: Manual Therapy, Neuromuscular Re-education, Therapeutic Activity, Therapeutic Exercise    Long Term Goals     PT Goal 1  Goal Identifier: LTG 1  Goal Description: Pt to be able to demonstrate improved shoulder function demonstrated by improvement in SPADI score by at least 8 points  Rationale: to maximize safety and independence with performance of ADLs and functional tasks;to maximize safety and independence within the home;to maximize safety and independence within the community;to maximize safety and independence with transportation;to maximize safety and independence with self cares  Target Date: 12/23/24      Frequency of Treatment: 1x per week  Duration of Treatment: 6 weeks    Recommended Referrals to Other Professionals:   Education Assessment:   Learner/Method: Patient;No Barriers to Learning    Risks and benefits of evaluation/treatment have been explained.   Patient/Family/caregiver agrees with Plan of Care.     Evaluation Time:     PT Eval, Low Complexity Minutes (36451): 18       Signing Clinician: Chadd Shahid PT

## 2024-11-21 ENCOUNTER — PATIENT OUTREACH (OUTPATIENT)
Dept: CARE COORDINATION | Facility: CLINIC | Age: 59
End: 2024-11-21
Payer: COMMERCIAL

## 2024-12-19 ENCOUNTER — PATIENT OUTREACH (OUTPATIENT)
Dept: CARE COORDINATION | Facility: CLINIC | Age: 59
End: 2024-12-19
Payer: COMMERCIAL

## 2025-01-30 ENCOUNTER — OFFICE VISIT (OUTPATIENT)
Dept: ORTHOPEDICS | Facility: CLINIC | Age: 60
End: 2025-01-30
Payer: COMMERCIAL

## 2025-01-30 ENCOUNTER — ANCILLARY PROCEDURE (OUTPATIENT)
Dept: GENERAL RADIOLOGY | Facility: CLINIC | Age: 60
End: 2025-01-30
Attending: PEDIATRICS
Payer: COMMERCIAL

## 2025-01-30 DIAGNOSIS — M16.12 ARTHRITIS OF LEFT HIP: ICD-10-CM

## 2025-01-30 DIAGNOSIS — M25.552 LEFT HIP PAIN: Primary | ICD-10-CM

## 2025-01-30 DIAGNOSIS — M53.3 SACROILIAC JOINT PAIN: ICD-10-CM

## 2025-01-30 NOTE — PROGRESS NOTES
ASSESSMENT & PLAN    Elsa was seen today for pain.    Diagnoses and all orders for this visit:    Left hip pain  -     XR Pelvis and Hip Left 1 View  -     Physical Therapy  Referral; Future    Sacroiliac joint pain  -     Physical Therapy  Referral; Future    Arthritis of left hip  -     Physical Therapy  Referral; Future      This issue is chronic and Unchanged.      ICD-10-CM    1. Left hip pain  M25.552 XR Pelvis and Hip Left 1 View     Physical Therapy  Referral      2. Sacroiliac joint pain  M53.3 Physical Therapy  Referral      3. Arthritis of left hip  M16.12 Physical Therapy  Referral          We discussed these other possible diagnosis: Left sided pain, possible SI joint or mild hip arthritis contributing, lumbar radicular pain is less likely given current exam.    Plan:  - Today's Plan of Care:  Rehab: Physical Therapy: [facility] - [phone]    Discussed activity considerations and other supportive care including Ice/Heat, OTC and other topical medications as needed.    -We also discussed other future treatment options:  MRI (hip v. Low back)  Consideration of injections (Hip v. SI joint)    Follow Up: 6 - 8 weeks  Can continue to follow up with me for further treatment recommendations    Concerning signs and symptoms were reviewed and all questions were answered at this time.    Lilian Dunbar MD [facility]  Sports Medicine Physician  [facility].     -----  Chief Complaint   Patient presents with    Left Hip - Pain       SUBJECTIVE  Emily Fritz is a/an 59 year old female who is seen as a self referral for evaluation of LEFT hip pain .     The patient is seen by themselves.    Onset: 2 month(s) ago. Patient describes injury as while doing yoga, she noticed it first started hurting and its gotten worse   Location of Pain: left hip; anterior, groin, can radiate around and to the posterior hip, then can feel it radiate to  the quad tendon superior knee   Worsened by: during the daytime, walking, any physical activity, sometimes sitting   Better with: AM is good, resting, icing  Treatments tried: rest/activity avoidance, ice, Tylenol, and ibuprofen, chiropractic care  Associated symptoms: tingling, weakness of left hip, feeling of instability, and stiffness    Orthopedic/Surgical history: YES - Been seen by Dr. Aguilar in the past for her shoulder.   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  CV: distal perfusion intact   Resp: normal respiratory effort without conversational dyspnea   Psych: normal mood and affect  Gait: NORMAL  Neuro: Normal light sensory exam of lower extremity    Bilateral hip exam    Inspection:      no edema or ecchymosis in hip area    Tender:      mild lower SI joint, buttock    Non Tender:      remainder of the hip area left    ROM:     Full active and passive ROM  bilateral  - mild pain with hip internal rotation left    Strength:      flexion 5/5 bilateral       abduction 4+/5 left       adduction 5/5 bilateral    Sensation:      grossly intact in hip and thigh    Special Tests:      positive (+) GUSTAVO left       positive (+) FADIR left      RADIOLOGY:  Final results and radiologist's interpretation, available in the [facility] health record.  Images were reviewed with the patient in the office today.  My personal interpretation of the performed imaging:     AP Pelvis and left hip XR views reviewed: no acute bony abnormality, mild bilateral hip joint degenerative change  - will follow official read    Review of the result(s) of each unique test - XR

## 2025-01-30 NOTE — PATIENT INSTRUCTIONS
We discussed these other possible diagnosis: Left sided pain, possible SI joint or mild hip arthritis contributing, lumbar radicular pain is less likely given current exam.    Plan:  - Today's Plan of Care:  Rehab: [phone]    Discussed activity considerations and other supportive care including Ice/Heat, OTC and other topical medications as needed.    -We also discussed other future treatment options:  MRI (hip v. Low back)  Consideration of injections (Hip v. SI joint)    Follow Up: 6 - 8 weeks  Can continue to follow up with me for further treatment recommendations    If you have any further questions for your physician or physician s care team you can call [phone].

## 2025-01-30 NOTE — LETTER
1/30/2025      Emily Fritz  [address] [facility] [address]      Dear Colleague,    Thank you for referring your patient, Emily Fritz, to the [facility]. Please see a copy of my visit note below.     ASSESSMENT & PLAN    Elsa was seen today for pain.    Diagnoses and all orders for this visit:    Left hip pain  -     XR Pelvis and Hip Left 1 View  -     Physical Therapy  Referral; Future    Sacroiliac joint pain  -     Physical Therapy  Referral; Future    Arthritis of left hip  -     Physical Therapy  Referral; Future      This issue is chronic and Unchanged.      ICD-10-CM    1. Left hip pain  M25.552 XR Pelvis and Hip Left 1 View     Physical Therapy  Referral      2. Sacroiliac joint pain  M53.3 Physical Therapy  Referral      3. Arthritis of left hip  M16.12 Physical Therapy  Referral          We discussed these other possible diagnosis: Left sided pain, possible SI joint or mild hip arthritis contributing, lumbar radicular pain is less likely given current exam.    Plan:  - Today's Plan of Care:  Rehab: Physical Therapy: [facility] - [phone]    Discussed activity considerations and other supportive care including Ice/Heat, OTC and other topical medications as needed.    -We also discussed other future treatment options:  MRI (hip v. Low back)  Consideration of injections (Hip v. SI joint)    Follow Up: 6 - 8 weeks  Can continue to follow up with me for further treatment recommendations    Concerning signs and symptoms were reviewed and all questions were answered at this time.    Lilian Dunbar MD [facility]  Sports Medicine Physician  [facility].     -----  Chief Complaint   Patient presents with     Left Hip - Pain       SUBJECTIVE  Emily Fritz is a/an 59 year old female who is seen as a self referral for evaluation of LEFT hip pain .     The patient is seen by  themselves.    Onset: 2 month(s) ago. Patient describes injury as while doing yoga, she noticed it first started hurting and its gotten worse   Location of Pain: left hip; anterior, groin, can radiate around and to the posterior hip, then can feel it radiate to the quad tendon superior knee   Worsened by: during the daytime, walking, any physical activity, sometimes sitting   Better with: AM is good, resting, icing  Treatments tried: rest/activity avoidance, ice, Tylenol, and ibuprofen, chiropractic care  Associated symptoms: tingling, weakness of left hip, feeling of instability, and stiffness    Orthopedic/Surgical history: YES - Been seen by Dr. Aguilar in the past for her shoulder.   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  CV: distal perfusion intact   Resp: normal respiratory effort without conversational dyspnea   Psych: normal mood and affect  Gait: NORMAL  Neuro: Normal light sensory exam of lower extremity    Bilateral hip exam    Inspection:      no edema or ecchymosis in hip area    Tender:      mild lower SI joint, buttock    Non Tender:      remainder of the hip area left    ROM:     Full active and passive ROM  bilateral  - mild pain with hip internal rotation left    Strength:      flexion 5/5 bilateral       abduction 4+/5 left       adduction 5/5 bilateral    Sensation:      grossly intact in hip and thigh    Special Tests:      positive (+) GUSTAVO left       positive (+) FADIR left      RADIOLOGY:  Final results and radiologist's interpretation, available in the [facility] health record.  Images were reviewed with the patient in the office today.  My personal interpretation of the performed imaging:     AP Pelvis and left hip XR views reviewed: no acute bony abnormality, mild bilateral hip joint degenerative  change  - will follow official read    Review of the result(s) of each unique test - XR             Again, thank you for allowing me to participate in the care of your patient.        Sincerely,        Lilian Dunbar MD    Electronically signed

## 2025-02-10 ENCOUNTER — THERAPY VISIT (OUTPATIENT)
Dept: PHYSICAL THERAPY | Facility: CLINIC | Age: 60
End: 2025-02-10
Payer: COMMERCIAL

## 2025-02-10 DIAGNOSIS — M16.12 PRIMARY OSTEOARTHRITIS OF LEFT HIP: Primary | ICD-10-CM

## 2025-02-10 PROCEDURE — 97110 THERAPEUTIC EXERCISES: CPT | Mod: GP

## 2025-02-10 PROCEDURE — 97161 PT EVAL LOW COMPLEX 20 MIN: CPT | Mod: GP

## 2025-02-10 PROCEDURE — 97530 THERAPEUTIC ACTIVITIES: CPT | Mod: GP

## 2025-02-10 ASSESSMENT — ACTIVITIES OF DAILY LIVING (ADL)
LANDING: MODERATE DIFFICULTY
RISE FROM A CHAIR: ACTIVITY IS MINIMALLY DIFFICULT
PAIN: THE SYMPTOM AFFECTS MY ACTIVITY SEVERELY
KNEEL ON THE FRONT OF YOUR KNEE: ACTIVITY IS MINIMALLY DIFFICULT
STAND: ACTIVITY IS NOT DIFFICULT
WALKING_UP_STEEP_HILLS: MODERATE DIFFICULTY
SWELLING: I DO NOT HAVE THE SYMPTOM
GO DOWN STAIRS: ACTIVITY IS MINIMALLY DIFFICULT
RISE FROM A CHAIR: ACTIVITY IS MINIMALLY DIFFICULT
WALKING_15_MINUTES_OR_GREATER: MODERATE DIFFICULTY
ABILITY_TO_PARTICIPATE_IN_YOUR_DESIRED_SPORT_AS_LONG_AS_YOU_WOULD_LIKE: MODERATE DIFFICULTY
STEPPING UP AND DOWN CURBS: MODERATE DIFFICULTY
GETTING_INTO_AND_OUT_OF_AN_AVERAGE_CAR: MODERATE DIFFICULTY
GOING UP 1 FLIGHT OF STAIRS: MODERATE DIFFICULTY
LIGHT_TO_MODERATE_WORK: MODERATE DIFFICULTY
STEPPING_UP_AND_DOWN_CURBS: MODERATE DIFFICULTY
PAIN: THE SYMPTOM AFFECTS MY ACTIVITY SEVERELY
STIFFNESS: THE SYMPTOM AFFECTS MY ACTIVITY SEVERELY
HOW_WOULD_YOU_RATE_YOUR_CURRENT_LEVEL_OF_FUNCTION_DURING_YOUR_USUAL_ACTIVITIES_OF_DAILY_LIVING_FROM_0_TO_100_WITH_100_BEING_YOUR_LEVEL_OF_FUNCTION_PRIOR_TO_YOUR_HIP_PROBLEM_AND_0_BEING_THE_INABILITY_TO_PERFORM_ANY_OF_YOUR_USUAL_DAILY_ACTIVITIES?: 60
SIT WITH YOUR KNEE BENT: ACTIVITY IS MINIMALLY DIFFICULT
HOS_ADL_SCORE(%): 51.47
TWISTING/PIVOTING ON INVOLVED LEG: SLIGHT DIFFICULTY
WALKING_APPROXIMATELY_10_MINUTES: MODERATE DIFFICULTY
SIT WITH YOUR KNEE BENT: ACTIVITY IS MINIMALLY DIFFICULT
GETTING_INTO_AND_OUT_OF_A_BATHTUB: MODERATE DIFFICULTY
TWISTING/PIVOTING_ON_INVOLVED_LEG: SLIGHT DIFFICULTY
ROLLING_OVER_IN_BED: SLIGHT DIFFICULTY
KNEEL ON THE FRONT OF YOUR KNEE: ACTIVITY IS MINIMALLY DIFFICULT
HOS_ADL_HIGHEST_POTENTIAL_SCORE: 68
STAND: ACTIVITY IS NOT DIFFICULT
SWELLING: I DO NOT HAVE THE SYMPTOM
SWINGING_OBJECTS_LIKE_A_GOLF_CLUB: SLIGHT DIFFICULTY
STARTING_AND_STOPPING_QUICKLY: MODERATE DIFFICULTY
STANDING FOR 15 MINUTES: SLIGHT DIFFICULTY
KNEE_ACTIVITY_OF_DAILY_LIVING_SCORE: 68.57
SQUAT: ACTIVITY IS MINIMALLY DIFFICULT
LOW_IMPACT_ACTIVITIES_LIKE_FAST_WALKING: MODERATE DIFFICULTY
PLEASE_INDICATE_YOR_PRIMARY_REASON_FOR_REFERRAL_TO_THERAPY:: KNEE
AS_A_RESULT_OF_YOUR_KNEE_INJURY,_HOW_WOULD_YOU_RATE_YOUR_CURRENT_LEVEL_OF_DAILY_ACTIVITY?: NEARLY NORMAL
HOW_WOULD_YOU_RATE_THE_OVERALL_FUNCTION_OF_YOUR_KNEE_DURING_YOUR_USUAL_DAILY_ACTIVITIES?: NEARLY NORMAL
PUTTING ON SOCKS AND SHOES: MODERATE DIFFICULTY
RAW_SCORE: 48
HOW_WOULD_YOU_RATE_YOUR_CURRENT_LEVEL_OF_FUNCTION?: ABNORMAL
GETTING INTO AND OUT OF AN AVERAGE CAR: MODERATE DIFFICULTY
HOW_WOULD_YOU_RATE_THE_CURRENT_FUNCTION_OF_YOUR_KNEE_DURING_YOUR_USUAL_DAILY_ACTIVITIES_ON_A_SCALE_FROM_0_TO_100_WITH_100_BEING_YOUR_LEVEL_OF_KNEE_FUNCTION_PRIOR_TO_YOUR_INJURY_AND_0_BEING_THE_INABILITY_TO_PERFORM_ANY_OF_YOUR_USUAL_DAILY_ACTIVITIES?: 80
ADL_TOTAL_ITEM_SCORE: 0
WEAKNESS: THE SYMPTOM AFFECTS MY ACTIVITY SLIGHTLY
LIMPING: THE SYMPTOM AFFECTS MY ACTIVITY SLIGHTLY
ADL_COUNT: 17
HOW_WOULD_YOU_RATE_THE_OVERALL_FUNCTION_OF_YOUR_KNEE_DURING_YOUR_USUAL_DAILY_ACTIVITIES?: NEARLY NORMAL
GETTING_INTO_AND_OUT_OF_A_BATHTUB: MODERATE DIFFICULTY
SQUAT: ACTIVITY IS MINIMALLY DIFFICULT
HEAVY_WORK: MODERATE DIFFICULTY
STANDING_FOR_15_MINUTES: SLIGHT DIFFICULTY
GIVING WAY, BUCKLING OR SHIFTING OF KNEE: I HAVE THE SYMPTOM BUT IT DOES NOT AFFECT MY ACTIVITY
CUTTING/LATERAL_MOVEMENTS: MODERATE DIFFICULTY
PUTTING_ON_SOCKS_AND_SHOES: MODERATE DIFFICULTY
SPORTS_COUNT: 9
GIVING WAY, BUCKLING OR SHIFTING OF KNEE: I HAVE THE SYMPTOM BUT IT DOES NOT AFFECT MY ACTIVITY
AS_A_RESULT_OF_YOUR_KNEE_INJURY,_HOW_WOULD_YOU_RATE_YOUR_CURRENT_LEVEL_OF_DAILY_ACTIVITY?: NEARLY NORMAL
GOING_DOWN_1_FLIGHT_OF_STAIRS: MODERATE DIFFICULTY
DEEP_SQUATTING: EXTREME DIFFICULTY
SPORTS_SCORE(%): 0
GOING DOWN 1 FLIGHT OF STAIRS: MODERATE DIFFICULTY
HOW_WOULD_YOU_RATE_YOUR_CURRENT_LEVEL_OF_FUNCTION_DURING_YOUR_SPORTS_RELATED_ACTIVITIES_FROM_0_TO_100_WITH_100_BEING_YOUR_LEVEL_OF_FUNCTION_PRIOR_TO_YOUR_HIP_PROBLEM_AND_0_BEING_THE_INABILITY_TO_PERFORM_ANY_OF_YOUR_USUAL_DAILY_ACTIVITIES?: 50
WALKING_DOWN_STEEP_HILLS: MODERATE DIFFICULTY
RECREATIONAL ACTIVITIES: MODERATE DIFFICULTY
DEEP SQUATTING: EXTREME DIFFICULTY
PLEASE_INDICATE_YOR_PRIMARY_REASON_FOR_REFERRAL_TO_THERAPY:: HIP
RECREATIONAL_ACTIVITIES: MODERATE DIFFICULTY
WALKING_15_MINUTES_OR_GREATER: MODERATE DIFFICULTY
GO DOWN STAIRS: ACTIVITY IS MINIMALLY DIFFICULT
SITTING_FOR_15_MINUTES: MODERATE DIFFICULTY
JUMPING: NO DIFFICULTY AT ALL
WALKING_INITIALLY: EXTREME DIFFICULTY
WALKING_DOWN_STEEP_HILLS: MODERATE DIFFICULTY
SITTING FOR 15 MINUTES: MODERATE DIFFICULTY
WALKING_UP_STEEP_HILLS: MODERATE DIFFICULTY
STIFFNESS: THE SYMPTOM AFFECTS MY ACTIVITY SEVERELY
KNEE_ACTIVITY_OF_DAILY_LIVING_SUM: 48
WALKING_FOR_APPROXIMATELY_10_MINUTES: MODERATE DIFFICULTY
HOW_WOULD_YOU_RATE_THE_CURRENT_FUNCTION_OF_YOUR_KNEE_DURING_YOUR_USUAL_DAILY_ACTIVITIES_ON_A_SCALE_FROM_0_TO_100_WITH_100_BEING_YOUR_LEVEL_OF_KNEE_FUNCTION_PRIOR_TO_YOUR_INJURY_AND_0_BEING_THE_INABILITY_TO_PERFORM_ANY_OF_YOUR_USUAL_DAILY_ACTIVITIES?: 80
LIMPING: THE SYMPTOM AFFECTS MY ACTIVITY SLIGHTLY
WALK: ACTIVITY IS SOMEWHAT DIFFICULT
SPORTS_HIGHEST_POTENTIAL_SCORE: 36
HEAVY_WORK: MODERATE DIFFICULTY
GO UP STAIRS: ACTIVITY IS SOMEWHAT DIFFICULT
ABILITY_TO_PERFORM_ACTIVITY_WITH_YOUR_NORMAL_TECHNIQUE: MODERATE DIFFICULTY
WALK: ACTIVITY IS SOMEWHAT DIFFICULT
LIGHT_TO_MODERATE_WORK: MODERATE DIFFICULTY
SPORTS_TOTAL_ITEM_SCORE: 0
WALKING_INITIALLY: EXTREME DIFFICULTY
ADL_HIGHEST_POTENTIAL_SCORE: 68
ADL_SCORE(%): 0
HOW_WOULD_YOU_RATE_YOUR_CURRENT_LEVEL_OF_FUNCTION_DURING_YOUR_USUAL_ACTIVITIES_OF_DAILY_LIVING_FROM_0_TO_100_WITH_100_BEING_YOUR_LEVEL_OF_FUNCTION_PRIOR_TO_YOUR_HIP_PROBLEM_AND_0_BEING_THE_INABILITY_TO_PERFORM_ANY_OF_YOUR_USUAL_DAILY_ACTIVITIES?: 60
HOS_ADL_ITEM_SCORE_TOTAL: 35
WEAKNESS: THE SYMPTOM AFFECTS MY ACTIVITY SLIGHTLY
GO UP STAIRS: ACTIVITY IS SOMEWHAT DIFFICULT
GOING_UP_1_FLIGHT_OF_STAIRS: MODERATE DIFFICULTY
ROLLING OVER IN BED: SLIGHT DIFFICULTY

## 2025-02-10 NOTE — PROGRESS NOTES
PHYSICAL THERAPY EVALUATION  Type of Visit: Evaluation              Subjective  Pt is a 59 year old arriving to PT for eval of left hip pain. Symptoms have been ongoing for >5 months. Mechanism of injury is described as gradual onset but noticed it worsen during a yoga session. Describes pain as deep, dull and burning at times located in the left anterior groin and can radiate to the postierior hip, as well as proximal hip flexor all the way down to the knee. Pain limits patient with sleeping, long periods of sitting and walking, exercises, etc. Pt occupation involves .     Imagin25 Xray  IMPRESSION: Mild degenerative arthritis left hip with medial joint space narrowing. No acute fracture. Lower lumbar spondylosis.          Presenting condition or subjective complaint: hip/hip flexor pain that radiates to my knee and some sciatica that can radiate down to my foot  Date of onset:      Relevant medical history:     Dates & types of surgery: C section & gallbladder removal    Prior diagnostic imaging/testing results: X-ray     Prior therapy history for the same diagnosis, illness or injury: No      Prior Level of Function  Transfers: Independent  Ambulation: Independent  ADL: Independent  IADL: Driving, Finances, Housekeeping, Laundry, Meal preparation, Medication management, Work    Living Environment  Social support: With a significant other or spouse   Type of home: House   Stairs to enter the home: No       Ramp: No   Stairs inside the home: Yes 17 Is there a railing: Yes     Help at home: None  Equipment owned:       Employment: Yes   Hobbies/Interests: walking, hiking, snowshoe, quilting, yoga, home workout programs    Patient goals for therapy:      Pain assessment: current sitting: 3/10  standing 1/10     Objective   HIP EVALUATION    Static Posture  No obvious findings and Pes planus     Dynamic Movement Screen  Single leg stance observations: Diffculty with balance eyes  closed, Lateral trunk lean, or Contralateral hip drop   Double limb squat observations: Quadriceps avoidance squatting pattern, Improper use of glutes/hips, Wide YANCY, and shifts weight back away from quads with toe rise on descent   Single limb squat observations: Not assessed  Gait: No significant findings    Range of Motion  Lumbar spine screen: Positive TTP L3-L5 and pain with prone hip extension in lumbar spine   Knee joint screen: WNL  SI joint screen: WNL       Hip ROM Right Left   Flexion 120 120   ER 15 15   IR 15 15           Flexibility Right Left   Quadriceps trivial mild   Hamstrings trivial trivial   Ankle trivial trivial   Figure 4 none/WNL mild     Hip and Knee Strength   MMT Right Left   Hip Abduction 4/5 4/5   Hip Extension 4/5 4/5   Hip ER 4/5 4/5          Knee MMT Quadriceps set Straight Leg Raise   Left Good Good   Right Good Good     Special Tests   FADIR GUSTAVO Log Roll Resisted SLR Rodolfo Prone Figure 4 Dulce's   Left Negative Positive Negative Positive Positive NA Negative   Right Negative Negative Negative Negative Negative Negative Negative     Palpation  Left: Mild tenderness to palpation at proximal hip flexor, groin area and upper gluteal   Right: Not assessed        Assessment & Plan   CLINICAL IMPRESSIONS  Medical Diagnosis: left hip osteoarthritis    Treatment Diagnosis: left hip osteoarthritis   Impression/Assessment: Patient is a 59 year old who presents to physical therapy with complaints of left hip pain. The patients symptoms and examination findings correlates with a diagnosis of left hip OA. The patient presents with impaired range of motion, strength and mobility which inhibits their ability to perform yoga, sit and walk for long periods of time as well as complete ADLs. Pt was educated of role and expected progression of PT, relevant anatomy, rationale for intervention, PT Px and Dx. The patient tolerated the session well with eval and HEP creation.  The patient will continue  to benefit from skilled PT for improved strength, ROM, mobility, and pain management.      Clinical Decision Making (Complexity):  Clinical Presentation: Stable/Uncomplicated  Clinical Presentation Rationale: based on medical and personal factors listed in PT evaluation  Clinical Decision Making (Complexity): Low complexity    PLAN OF CARE  Treatment Interventions:  Modalities: Cryotherapy, Hot Pack  Interventions: Manual Therapy, Neuromuscular Re-education, Therapeutic Activity, Therapeutic Exercise, Self-Care/Home Management    Long Term Goals     PT Goal 1  Goal Identifier: Yoga/Exercise Routine  PT Goal 2  Goal Identifier: Sleep      Frequency of Treatment: 1x/week  Duration of Treatment: 12 weeks    Recommended Referrals to Other Professionals:  NA  Education Assessment:   Learner/Method: Patient    Risks and benefits of evaluation/treatment have been explained.   Patient/Family/caregiver agrees with Plan of Care.     Evaluation Time:     PT Eval, Low Complexity Minutes (74263): 15  Evaluation Only     Signing Clinician: Roya Barcenas, PT

## 2025-02-11 SDOH — HEALTH STABILITY: PHYSICAL HEALTH: ON AVERAGE, HOW MANY MINUTES DO YOU ENGAGE IN EXERCISE AT THIS LEVEL?: 10 MIN

## 2025-02-11 SDOH — HEALTH STABILITY: PHYSICAL HEALTH: ON AVERAGE, HOW MANY DAYS PER WEEK DO YOU ENGAGE IN MODERATE TO STRENUOUS EXERCISE (LIKE A BRISK WALK)?: 3 DAYS

## 2025-02-11 ASSESSMENT — SOCIAL DETERMINANTS OF HEALTH (SDOH): HOW OFTEN DO YOU GET TOGETHER WITH FRIENDS OR RELATIVES?: ONCE A WEEK

## 2025-02-17 ENCOUNTER — MYC MEDICAL ADVICE (OUTPATIENT)
Dept: FAMILY MEDICINE | Facility: CLINIC | Age: 60
End: 2025-02-17

## 2025-02-17 ENCOUNTER — OFFICE VISIT (OUTPATIENT)
Dept: FAMILY MEDICINE | Facility: CLINIC | Age: 60
End: 2025-02-17
Attending: PHYSICIAN ASSISTANT
Payer: COMMERCIAL

## 2025-02-17 VITALS
BODY MASS INDEX: 21.9 KG/M2 | OXYGEN SATURATION: 99 % | RESPIRATION RATE: 20 BRPM | HEIGHT: 70 IN | SYSTOLIC BLOOD PRESSURE: 138 MMHG | WEIGHT: 153 LBS | DIASTOLIC BLOOD PRESSURE: 82 MMHG | HEART RATE: 81 BPM | TEMPERATURE: 96.9 F

## 2025-02-17 DIAGNOSIS — G43.109 MIGRAINE WITH AURA AND WITHOUT STATUS MIGRAINOSUS, NOT INTRACTABLE: ICD-10-CM

## 2025-02-17 DIAGNOSIS — Z00.00 ROUTINE GENERAL MEDICAL EXAMINATION AT A HEALTH CARE FACILITY: Primary | ICD-10-CM

## 2025-02-17 DIAGNOSIS — E83.110 HEREDITARY HEMOCHROMATOSIS: Primary | ICD-10-CM

## 2025-02-17 DIAGNOSIS — E61.1 IRON DEFICIENCY: ICD-10-CM

## 2025-02-17 DIAGNOSIS — E83.110 HEREDITARY HEMOCHROMATOSIS: ICD-10-CM

## 2025-02-17 DIAGNOSIS — Z12.31 VISIT FOR SCREENING MAMMOGRAM: ICD-10-CM

## 2025-02-17 DIAGNOSIS — Z78.0 POSTMENOPAUSAL ESTROGEN DEFICIENCY: ICD-10-CM

## 2025-02-17 LAB
ALBUMIN SERPL BCG-MCNC: 4.3 G/DL (ref 3.5–5.2)
ALP SERPL-CCNC: 103 U/L (ref 40–150)
ALT SERPL W P-5'-P-CCNC: 19 U/L (ref 0–50)
AST SERPL W P-5'-P-CCNC: 28 U/L (ref 0–45)
BILIRUB DIRECT SERPL-MCNC: <0.2 MG/DL (ref 0–0.3)
BILIRUB SERPL-MCNC: 0.5 MG/DL
CHOLEST SERPL-MCNC: 197 MG/DL
FASTING STATUS PATIENT QL REPORTED: YES
FASTING STATUS PATIENT QL REPORTED: YES
FERRITIN SERPL-MCNC: 240 NG/ML (ref 11–328)
GLUCOSE SERPL-MCNC: 103 MG/DL (ref 70–99)
HDLC SERPL-MCNC: 77 MG/DL
IRON BINDING CAPACITY (ROCHE): ABNORMAL
IRON SATN MFR SERPL: ABNORMAL %
IRON SERPL-MCNC: 215 UG/DL (ref 37–145)
LDLC SERPL CALC-MCNC: 108 MG/DL
NONHDLC SERPL-MCNC: 120 MG/DL
PROT SERPL-MCNC: 7 G/DL (ref 6.4–8.3)
TRIGL SERPL-MCNC: 60 MG/DL

## 2025-02-17 PROCEDURE — 99396 PREV VISIT EST AGE 40-64: CPT | Performed by: PHYSICIAN ASSISTANT

## 2025-02-17 PROCEDURE — 82947 ASSAY GLUCOSE BLOOD QUANT: CPT | Performed by: PHYSICIAN ASSISTANT

## 2025-02-17 PROCEDURE — 80076 HEPATIC FUNCTION PANEL: CPT | Performed by: PHYSICIAN ASSISTANT

## 2025-02-17 PROCEDURE — 82728 ASSAY OF FERRITIN: CPT | Performed by: PHYSICIAN ASSISTANT

## 2025-02-17 PROCEDURE — 83540 ASSAY OF IRON: CPT | Performed by: PHYSICIAN ASSISTANT

## 2025-02-17 PROCEDURE — 83550 IRON BINDING TEST: CPT | Performed by: PHYSICIAN ASSISTANT

## 2025-02-17 PROCEDURE — G2211 COMPLEX E/M VISIT ADD ON: HCPCS | Performed by: PHYSICIAN ASSISTANT

## 2025-02-17 PROCEDURE — 36415 COLL VENOUS BLD VENIPUNCTURE: CPT | Performed by: PHYSICIAN ASSISTANT

## 2025-02-17 PROCEDURE — 80061 LIPID PANEL: CPT | Performed by: PHYSICIAN ASSISTANT

## 2025-02-17 PROCEDURE — 99213 OFFICE O/P EST LOW 20 MIN: CPT | Mod: 25 | Performed by: PHYSICIAN ASSISTANT

## 2025-02-17 RX ORDER — IBUPROFEN 200 MG
200 TABLET ORAL
COMMUNITY

## 2025-02-17 RX ORDER — RIZATRIPTAN BENZOATE 10 MG/1
TABLET ORAL
Qty: 18 TABLET | Refills: 5 | Status: SHIPPED | OUTPATIENT
Start: 2025-02-17

## 2025-02-17 RX ORDER — RIZATRIPTAN BENZOATE 10 MG/1
TABLET ORAL
Qty: 18 TABLET | Refills: 5 | OUTPATIENT
Start: 2025-02-17

## 2025-02-17 ASSESSMENT — PAIN SCALES - GENERAL: PAINLEVEL_OUTOF10: NO PAIN (0)

## 2025-02-17 NOTE — PATIENT INSTRUCTIONS
Patient Education   Preventive Care Advice   This is general advice given by our system to help you stay healthy. However, your care team may have specific advice just for you. Please talk to your care team about your preventive care needs.  Nutrition  Eat 5 or more servings of fruits and vegetables each day.  Try wheat bread, brown rice and whole grain pasta (instead of white bread, rice, and pasta).  Get enough calcium and vitamin D. Check the label on foods and aim for 100% of the RDA (recommended daily allowance).  Lifestyle  Exercise at least 150 minutes each week  (30 minutes a day, 5 days a week).  Do muscle strengthening activities 2 days a week. These help control your weight and prevent disease.  No smoking.  Wear sunscreen to prevent skin cancer.  Have a dental exam and cleaning every 6 months.  Yearly exams  See your health care team every year to talk about:  Any changes in your health.  Any medicines your care team has prescribed.  Preventive care, family planning, and ways to prevent chronic diseases.  Shots (vaccines)   HPV shots (up to age 26), if you've never had them before.  Hepatitis B shots (up to age 59), if you've never had them before.  COVID-19 shot: Get this shot when it's due.  Flu shot: Get a flu shot every year.  Tetanus shot: Get a tetanus shot every 10 years.  Pneumococcal, hepatitis A, and RSV shots: Ask your care team if you need these based on your risk.  Shingles shot (for age 50 and up)  General health tests  Diabetes screening:  Starting at age 35, Get screened for diabetes at least every 3 years.  If you are younger than age 35, ask your care team if you should be screened for diabetes.  Cholesterol test: At age 39, start having a cholesterol test every 5 years, or more often if advised.  Bone density scan (DEXA): At age 50, ask your care team if you should have this scan for osteoporosis (brittle bones).  Hepatitis C: Get tested at least once in your life.  STIs (sexually  transmitted infections)  Before age 24: Ask your care team if you should be screened for STIs.  After age 24: Get screened for STIs if you're at risk. You are at risk for STIs (including HIV) if:  You are sexually active with more than one person.  You don't use condoms every time.  You or a partner was diagnosed with a sexually transmitted infection.  If you are at risk for HIV, ask about PrEP medicine to prevent HIV.  Get tested for HIV at least once in your life, whether you are at risk for HIV or not.  Cancer screening tests  Cervical cancer screening: If you have a cervix, begin getting regular cervical cancer screening tests starting at age 21.  Breast cancer scan (mammogram): If you've ever had breasts, begin having regular mammograms starting at age 40. This is a scan to check for breast cancer.  Colon cancer screening: It is important to start screening for colon cancer at age 45.  Have a colonoscopy test every 10 years (or more often if you're at risk) Or, ask your provider about stool tests like a FIT test every year or Cologuard test every 3 years.  To learn more about your testing options, visit:   .  For help making a decision, visit:   https://bit.ly/gp43288.  Prostate cancer screening test: If you have a prostate, ask your care team if a prostate cancer screening test (PSA) at age 55 is right for you.  Lung cancer screening: If you are a current or former smoker ages 50 to 80, ask your care team if ongoing lung cancer screenings are right for you.  For informational purposes only. Not to replace the advice of your health care provider. Copyright   2023 [facility]. All rights reserved. Clinically reviewed by the [facility]. [employer] 707776 - REV 01/24.  Substance Use Disorder: Care Instructions  Overview     You can improve your life and health by stopping your use of alcohol or drugs. When you don't drink or use drugs, you may feel and sleep better. You may  get along better with your family, friends, and coworkers. There are medicines and programs that can help with substance use disorder.  How can you care for yourself at home?  Here are some ways to help you stay sober and prevent relapse.  If you have been given medicine to help keep you sober or reduce your cravings, be sure to take it exactly as prescribed.  Talk to your doctor about programs that can help you stop using drugs or drinking alcohol.  Do not keep alcohol or drugs in your home.  Plan ahead. Think about what you'll say if other people ask you to drink or use drugs. Try not to spend time with people who drink or use drugs.  Use the time and money spent on drinking or drugs to do something that's important to you.  Preventing a relapse  Have a plan to deal with relapse. Learn to recognize changes in your thinking that lead you to drink or use drugs. Get help before you start to drink or use drugs again.  Try to stay away from situations, friends, or places that may lead you to drink or use drugs.  If you feel the need to drink alcohol or use drugs again, seek help right away. Call a trusted friend or family member. Some people get support from organizations such as Narcotics Anonymous or [employer] or from treatment facilities.  If you relapse, get help as soon as you can. Some people make a plan with another person that outlines what they want that person to do for them if they relapse. The plan usually includes how to handle the relapse and who to notify in case of relapse.  Don't give up. Remember that a relapse doesn't mean that you have failed. Use the experience to learn the triggers that lead you to drink or use drugs. Then quit again. Recovery is a lifelong process. Many people have several relapses before they are able to quit for good.  Follow-up care is a key part of your treatment and safety. Be sure to make and go to all appointments, and call your doctor if you are having problems. It's  "also a good idea to know your test results and keep a list of the medicines you take.  When should you call for help?   Call 911  anytime you think you may need emergency care. For example, call if you or someone else:    Has overdosed or has withdrawal signs. Be sure to tell the emergency workers that you are or someone else is using or trying to quit using drugs. Overdose or withdrawal signs may include:  Losing consciousness.  Seizure.  Seeing or hearing things that aren't there (hallucinations).     Is thinking or talking about suicide or harming others.   Where to get help 24 hours a day, 7 days a week   If you or someone you know talks about suicide, self-harm, a mental health crisis, a substance use crisis, or any other kind of emotional distress, get help right away. You can:    Call the Suicide and Crisis Lifeline at 988.     Call 5-864-879-TALK (1-432.127.4265).     Text HOME to 764297 to access the Crisis Text Line.   Consider saving these numbers in your phone.  Go to [employer] for more information or to chat online.  Call your doctor now or seek immediate medical care if:    You are having withdrawal symptoms. These may include nausea or vomiting, sweating, shakiness, and anxiety.   Watch closely for changes in your health, and be sure to contact your doctor if:    You have a relapse.     You need more help or support to stop.   Where can you learn more?  Go to https://www.[employer].net/patiented  Enter H573 in the search box to learn more about \"Substance Use Disorder: Care Instructions.\"  Current as of: November 15, 2023  Content Version: 14.3    2024 [employer].   Care instructions adapted under license by your healthcare professional. If you have questions about a medical condition or this instruction, always ask your healthcare professional. [employer] disclaims any warranty or liability for your use of this information.       "

## 2025-02-17 NOTE — PROGRESS NOTES
Preventive Care Visit  [facility] [place] Kristen M. Kehr, PA-C, Family Medicine  Feb 17, 2025      Assessment & Plan     Routine general medical examination at a health care facility  Health maintenance reviewed and updated.  Recommend regular skin checks with Dermatology.   - Lipid panel reflex to direct LDL Fasting; Future  - Glucose; Future  - Adult Dermatology  Referral; Future  - Lipid panel reflex to direct LDL Fasting  - Glucose    Migraine with aura and without status migrainosus, not intractable  Stable condition well controlled with medication used as needed.   - rizatriptan (MAXALT) 10 MG tablet; TAKE 1 TAB AT ONSET OF HEADACHE FOR MIGRAINE. REPEAT IN 2 HOURS IF NEEDED. MAX 3 TABLETS/24 HOURS.    Visit for screening mammogram  - MA Screening Bilateral w/ [place]; Future    Postmenopausal estrogen deficiency  - DX Bone Density; Future      The longitudinal plan of care for the diagnosis(es)/condition(s) as documented were addressed during this visit. Due to the added complexity in care, I will continue to support Elsa in the subsequent management and with ongoing continuity of care.        Counseling  Appropriate preventive services were addressed with this patient via screening, questionnaire, or discussion as appropriate for fall prevention, nutrition, physical activity, Tobacco-use cessation, social engagement, weight loss and cognition.  Checklist reviewing preventive services available has been given to the patient.  Reviewed patient's diet, addressing concerns and/or questions.   She is at risk for lack of exercise and has been provided with information to increase physical activity for the benefit of her well-being.           Subjective   Elsa is a 59 year old, presenting for the following:  Physical        2/17/2025     8:00 AM   Additional Questions   Roomed by Rosangela COELLO   Accompanied by self     HPI  Elsa is here today for preventative appointment.     She will also need refills  of maxalt for management of migraines. The medication works well.     Health Care Directive  Patient does not have a Health Care Directive: Discussed advance care planning with patient; information given to patient to review.      2/11/2025   General Health   How would you rate your overall physical health? Good   Feel stress (tense, anxious, or unable to sleep) Only a little   (!) STRESS CONCERN      2/11/2025   Nutrition   Three or more servings of calcium each day? Yes   Diet: Regular (no restrictions)   How many servings of fruit and vegetables per day? (!) 2-3   How many sweetened beverages each day? 0-1         2/11/2025   Exercise   Days per week of moderate/strenous exercise 3 days   Average minutes spent exercising at this level 10 min         2/11/2025   Social Factors   Frequency of gathering with friends or relatives Once a week   Worry food won't last until get money to buy more No   Food not last or not have enough money for food? No   Do you have housing? (Housing is defined as stable permanent housing and does not include staying ouside in a car, in a tent, in an abandoned building, in an overnight shelter, or couch-surfing.) Yes   Are you worried about losing your housing? No   Lack of transportation? No   Unable to get utilities (heat,electricity)? No         2/11/2025   Fall Risk   Fallen 2 or more times in the past year? No   Trouble with walking or balance? No          2/11/2025   Dental   Dentist two times every year? Yes         2/14/2024   TB Screening   Were you born outside of the [place]? No         Today's PHQ-2 Score:       2/17/2025     8:00 AM   PHQ-2 ( 1999 [employer])   Q1: Little interest or pleasure in doing things 0   Q2: Feeling down, depressed or hopeless 0   PHQ-2 Score 0    Q1: Little interest or pleasure in doing things Not at all   Q2: Feeling down, depressed or hopeless Not at all   PHQ-2 Score 0       Patient-reported           2/11/2025   Substance Use   Alcohol more than 3/day or  more than 7/wk No   Do you use any other substances recreationally? (!) CANNABIS PRODUCTS     Social History     Tobacco Use    Smoking status: Former     Current packs/day: 0.00     Types: Cigarettes     Quit date: 1985     Years since quittin.1     Passive exposure: Never    Smokeless tobacco: Never    Tobacco comments:     lives in smoke free household   Vaping Use    Vaping status: Never Used   Substance Use Topics    Alcohol use: Yes    Drug use: No           2023   LAST FHS-7 RESULTS   1st degree relative breast or ovarian cancer Yes   Any relative bilateral breast cancer No   Any male have breast cancer No   Any ONE woman have BOTH breast AND ovarian cancer No   Any woman with breast cancer before 50yrs No   2 or more relatives with breast AND/OR ovarian cancer Yes   2 or more relatives with breast AND/OR bowel cancer No        Mammogram Screening - Mammogram every 1-2 years updated in Health Maintenance based on mutual decision making        2025   STI Screening   New sexual partner(s) since last STI/HIV test? No     History of abnormal Pap smear: No - age 30- 64 PAP with HPV every 5 years recommended        Latest Ref Rng & Units 2022    11:03 AM 2017    10:01 AM 2017     9:55 AM   PAP / HPV   PAP  Negative for Intraepithelial Lesion or Malignancy (NILM)      PAP (Historical)   NIL     HPV 16 DNA Negative Negative   Negative    HPV 18 DNA Negative Negative   Negative    Other HR HPV Negative Negative   Negative      ASCVD Risk   The 10-year ASCVD risk score (Yobani YIN, et al., 2019) is: 2.8%    Values used to calculate the score:      Age: 59 years      Sex: Female      Is Non- : No      Diabetic: No      Tobacco smoker: No      Systolic Blood Pressure: 141 mmHg      Is BP treated: No      HDL Cholesterol: 72 mg/dL      Total Cholesterol: 191 mg/dL           Reviewed and updated as needed this visit by Provider                    Past  Medical History:   Diagnosis Date    Allergic rhinitis due to animal dander     Chronic constipation     Diagnostic skin and sensitization tests 12 skin tests pos. for: dog(+)/DM/CR    Family history of breast cancer in mother 12/10/2014    Family history of breast cancer in mother     Family history of colonic polyps 12/10/2014    Family history of colonic polyps 12/10/2014    Family history of colonic polyps     Heart palpitations 3/2009    Holter    House dust mite allergy     Lateral epicondylitis 3/28/2012    Rhinitis, allergic to other allergen      Past Surgical History:   Procedure Laterality Date    C/SECTION, LOW TRANSVERSE  1988    CHOLECYSTECTOMY      COLONOSCOPY N/A 2021    Procedure: COLONOSCOPY, WITH POLYPECTOMY AND BIOPSY;  Surgeon: Sonia Brenner MD;  Location: [facility] OR    DILATION AND CURETTAGE, HYSTEROSCOPY DIAGNOSTIC, COMBINED  2016    Menometrorrhagia     OB History    Para Term  AB Living   3 3 3 0 0 3   SAB IAB Ectopic Multiple Live Births   0 0 0 0 3      # Outcome Date GA Lbr Brannon/2nd Weight Sex Type Anes PTL Lv   3 Term 93 40w0d  3.402 kg (7 lb 8 oz) M    EMMA      Name: Deion   2 Term 89 40w0d  3.572 kg (7 lb 14 oz) M    EMMA      Name: Roc   1 Term 88 40w0d  3.345 kg (7 lb 6 oz) F -SEC   EMMA      Name: Tammy     BP Readings from Last 3 Encounters:   25 138/82   24 (!) 141/85   02/15/24 122/82    Wt Readings from Last 3 Encounters:   25 69.4 kg (153 lb)   10/14/24 68 kg (150 lb)   10/01/24 68 kg (150 lb)                  Patient Active Problem List   Diagnosis    CARDIOVASCULAR SCREENING; LDL GOAL LESS THAN 160    Cholelithiasis    Diagnostic skin and sensitization tests    Allergic rhinitis due to animal dander    Rhinitis, allergic to other allergen    House dust mite allergy    Lateral epicondylitis    Vitamin D deficiency disease    Heart palpitations    Family history of colonic polyps    Family  history of breast cancer in mother    Gastroesophageal reflux disease without esophagitis    Menorrhagia with regular cycle    Dry eyes    Migraine with aura and without status migrainosus, not intractable    Acute pain of left shoulder    Rotator cuff syndrome of left shoulder    Primary osteoarthritis of left hip     Past Surgical History:   Procedure Laterality Date    C/SECTION, LOW TRANSVERSE  1988    CHOLECYSTECTOMY      COLONOSCOPY N/A 2021    Procedure: COLONOSCOPY, WITH POLYPECTOMY AND BIOPSY;  Surgeon: Sonia Brenner MD;  Location: [facility] OR    DILATION AND CURETTAGE, HYSTEROSCOPY DIAGNOSTIC, COMBINED  2016    Menometrorrhagia       Social History     Tobacco Use    Smoking status: Former     Current packs/day: 0.00     Types: Cigarettes     Quit date: 1985     Years since quittin.1     Passive exposure: Never    Smokeless tobacco: Never    Tobacco comments:     lives in smoke free household   Substance Use Topics    Alcohol use: Yes     Family History   Problem Relation Age of Onset    Diabetes Mother 78    Hypertension Mother     Thyroid Disease Mother     Cancer Mother 78        breast-mastectomy    Circulatory Mother         DVT    Breast Cancer Mother 79    Hypertension Father     Cerebrovascular Disease Father     Heart Disease Father         CHF    Hypertension Sister     Other Cancer Sister         Carcinoid Cancer    Breast Cancer Maternal Aunt         postmenopausal    Macular Degeneration Paternal Aunt     Breast Cancer Maternal Aunt         postmenopausal    Gastrointestinal Disease Sister         gastric carcinoid tumors with mets to liver    Depression Brother         Suicide 2020    Glaucoma No family hx of          Current Outpatient Medications   Medication Sig Dispense Refill    diclofenac (VOLTAREN) 1 % topical gel 1 g to affected areas on right foot 2-3 times a day as needed for pain. 100 g 2    ibuprofen (ADVIL/MOTRIN) 200 MG tablet Take 200 mg by mouth.    "   rizatriptan (MAXALT) 10 MG tablet TAKE 1 TAB AT ONSET OF HEADACHE FOR MIGRAINE. REPEAT IN 2 HOURS IF NEEDED. MAX 3 TABLETS/24 HOURS. 18 tablet 5    triamcinolone (KENALOG) 0.1 % external cream Apply topically 2 times daily 453.6 g 0    VITAMIN D PO Take by mouth.       Allergies   Allergen Reactions    Nkda [No Known Drug Allergy]      Recent Labs   Lab Test 02/15/24  0854 09/28/22  1000 09/12/22  1115 10/13/21  1014 05/14/19  0720 09/18/18  0740   * 129*  --   --  78  --    HDL 72 75  --   --  70  --    TRIG 40 53  --   --  48  --    ALT  --   --  21 23  --  21   CR  --   --  0.76 0.85  --   --    GFRESTIMATED  --   --  >90 77  --   --    POTASSIUM  --   --  3.9 4.0  --   --    TSH  --   --  2.60  --   --   --           Review of Systems  Constitutional, HEENT, cardiovascular, pulmonary, GI, , musculoskeletal, neuro, skin, endocrine and psych systems are negative, except as otherwise noted.     Objective    Exam  LMP 07/22/2016 (Approximate)    Estimated body mass index is 21.52 kg/m  as calculated from the following:    Height as of 10/14/24: 1.778 m (5' 10\").    Weight as of 10/14/24: 68 kg (150 lb).    Physical Exam  GENERAL: alert and no distress  EYES: Eyes grossly normal to inspection, PERRL and conjunctivae and sclerae normal  HENT: ear canals and TM's normal, nose and mouth without ulcers or lesions  NECK: no adenopathy, no asymmetry, masses, or scars  RESP: lungs clear to auscultation - no rales, rhonchi or wheezes  BREAST: normal without masses, tenderness or nipple discharge and no palpable axillary masses or adenopathy  CV: regular rate and rhythm, normal S1 S2, no S3 or S4, no murmur, click or rub, no peripheral edema  ABDOMEN: soft, nontender, no hepatosplenomegaly, no masses and bowel sounds normal  MS: no gross musculoskeletal defects noted, no edema  SKIN: no suspicious lesions or rashes  NEURO: Normal strength and tone, mentation intact and speech normal  PSYCH: mentation appears " normal, affect normal/bright        Signed Electronically by: Kristen M. Kehr, PA-C

## 2025-02-18 ENCOUNTER — PATIENT OUTREACH (OUTPATIENT)
Dept: CARE COORDINATION | Facility: CLINIC | Age: 60
End: 2025-02-18
Payer: COMMERCIAL

## 2025-03-10 ENCOUNTER — OFFICE VISIT (OUTPATIENT)
Dept: ORTHOPEDICS | Facility: CLINIC | Age: 60
End: 2025-03-10
Payer: COMMERCIAL

## 2025-03-10 DIAGNOSIS — M16.12 ARTHRITIS OF LEFT HIP: ICD-10-CM

## 2025-03-10 DIAGNOSIS — M25.552 LEFT HIP PAIN: Primary | ICD-10-CM

## 2025-03-10 DIAGNOSIS — M53.3 SACROILIAC JOINT PAIN: ICD-10-CM

## 2025-03-10 PROCEDURE — 99214 OFFICE O/P EST MOD 30 MIN: CPT | Performed by: PEDIATRICS

## 2025-03-10 NOTE — LETTER
3/10/2025      Emily Fritz  [address] [address] [facility] [place] [address]      Dear Colleague,    Thank you for referring your patient, Emily Fritz, to the [facility]. Please see a copy of my visit note below.    ASSESSMENT & PLAN    Elsa was seen today for pain and follow up.    Diagnoses and all orders for this visit:    Left hip pain    Sacroiliac joint pain    Arthritis of left hip      This issue is acute on chronic and Unchanged.      ICD-10-CM    1. Left hip pain  M25.552 Orthopedic  Referral      2. Sacroiliac joint pain  M53.3       3. Arthritis of left hip  M16.12 Orthopedic  Referral        Patient Instructions   We discussed these other possible diagnosis: SI joint and low back pain has improved with physical therapy, today's pain more consistent with hip joint pain. Some radiating pain could still be lumbar related.    We discussed the following treatment options: symptom treatment, activity modification/rest, imaging, injection, rehab and referral. Following discussion, plan: will trial US guided left hip injection next step.    Plan:  - Today's Plan of Care:  US guided left hip injection - corticosteroid    Discussed activity considerations and other supportive care including Ice/Heat, OTC and other topical medications as needed.    Continue PT and Home Exercise Program    -We also discussed other future treatment options:  MRI (likely hip v. Lumbar)    Follow Up: ~ 3 -4 weeks after injection    Concerning signs and symptoms were reviewed and all questions were answered at this time.    Lilian Dunbar MD [facility]  Sports Medicine Physician  [facility]    SUBJECTIVE- Interim History March 10, 2025    Chief Complaint   Patient presents with     Left Hip - Pain, Follow Up       Emily Fritz is a 59 year old female who is seen in f/u up for    Left hip pain  Sacroiliac joint pain  Arthritis of left hip.  Since last visit  on 1/30/2025 patient has moderate-severe radiating deep anterior left hip joint pain.  Physical therapy has improved her SI joint pain.  Continues to describe intermittent sharp throbbing/aching to anterior & lower leg at times, mostly in hip.  Patient notes most pain with walking, crossing legs, & lying on left side.  Currently treating with ibuprofen that is causing GI distress.  - Now ~ 3+ months from initial onset    Worsened by: during the daytime, walking, any physical activity, sometimes sitting   Better with: AM is good, resting, icing  Treatments tried: rest/activity avoidance, ice, Tylenol, and ibuprofen, chiropractic care, physical therapy  Associated symptoms: tingling, weakness of left hip, feeling of instability, and stiffness    The patient is seen by themselves.    Orthopedic/Surgical history: YES - Been seen by Dr. Aguilar in the past for her shoulder.   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  CV: distal perfusion intact   Resp: normal respiratory effort without conversational dyspnea   Psych: normal mood and affect  Gait: NORMAL  Neuro: Normal light sensory exam of lower extremity     Bilateral hip exam  Inspection:      no edema or ecchymosis in hip area     Tender:      mild lateral hip     Non Tender:      remainder of the hip area left     ROM:     Full active and passive ROM  bilateral  - mild pain with hip internal rotation left     Strength:      flexion 5/5 bilateral       abduction 4+/5 left       adduction 5/5 bilateral     Sensation:      grossly intact in hip and thigh     Special Tests:      neg (-) GUSTAVO left       positive (+) FADIR left    RADIOLOGY:  Final results and radiologist's interpretation, available in the [facility] health record.  Images were reviewed with the patient in the office  today.  My personal interpretation of the performed imaging:     AP Pelvis and left hip XR views reviewed 1/30/2025: no acute bony abnormality, mild bilateral hip joint degenerative change     Reviewed PT notes  Review of the result(s) of each unique test - XR             Again, thank you for allowing me to participate in the care of your patient.        Sincerely,        Lilian Dunbar MD    Electronically signed

## 2025-03-10 NOTE — PROGRESS NOTES
ASSESSMENT & PLAN    Elsa was seen today for pain and follow up.    Diagnoses and all orders for this visit:    Left hip pain    Sacroiliac joint pain    Arthritis of left hip      This issue is acute on chronic and Unchanged.      ICD-10-CM    1. Left hip pain  M25.552 Orthopedic  Referral      2. Sacroiliac joint pain  M53.3       3. Arthritis of left hip  M16.12 Orthopedic  Referral        Patient Instructions   We discussed these other possible diagnosis: SI joint and low back pain has improved with physical therapy, today's pain more consistent with hip joint pain. Some radiating pain could still be lumbar related.    We discussed the following treatment options: symptom treatment, activity modification/rest, imaging, injection, rehab and referral. Following discussion, plan: will trial US guided left hip injection next step.    Plan:  - Today's Plan of Care:  US guided left hip injection - corticosteroid    Discussed activity considerations and other supportive care including Ice/Heat, OTC and other topical medications as needed.    Continue PT and Home Exercise Program    -We also discussed other future treatment options:  MRI (likely hip v. Lumbar)    Follow Up: ~ 3 -4 weeks after injection    Concerning signs and symptoms were reviewed and all questions were answered at this time.    Lilian Dunbar MD [facility]  Sports Medicine Physician  [facility]    SUBJECTIVE- Interim History March 10, 2025    Chief Complaint   Patient presents with    Left Hip - Pain, Follow Up       Emily Fritz is a 59 year old female who is seen in f/u up for    Left hip pain  Sacroiliac joint pain  Arthritis of left hip.  Since last visit on 1/30/2025 patient has moderate-severe radiating deep anterior left hip joint pain.  Physical therapy has improved her SI joint pain.  Continues to describe intermittent sharp throbbing/aching to anterior & lower leg at times, mostly in hip.  Patient notes  most pain with walking, crossing legs, & lying on left side.  Currently treating with ibuprofen that is causing GI distress.  - Now ~ 3+ months from initial onset    Worsened by: during the daytime, walking, any physical activity, sometimes sitting   Better with: AM is good, resting, icing  Treatments tried: rest/activity avoidance, ice, Tylenol, and ibuprofen, chiropractic care, physical therapy  Associated symptoms: tingling, weakness of left hip, feeling of instability, and stiffness    The patient is seen by themselves.    Orthopedic/Surgical history: YES - Been seen by Dr. Aguilar in the past for her shoulder.   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  CV: distal perfusion intact   Resp: normal respiratory effort without conversational dyspnea   Psych: normal mood and affect  Gait: NORMAL  Neuro: Normal light sensory exam of lower extremity     Bilateral hip exam  Inspection:      no edema or ecchymosis in hip area     Tender:      mild lateral hip     Non Tender:      remainder of the hip area left     ROM:     Full active and passive ROM  bilateral  - mild pain with hip internal rotation left     Strength:      flexion 5/5 bilateral       abduction 4+/5 left       adduction 5/5 bilateral     Sensation:      grossly intact in hip and thigh     Special Tests:      neg (-) GUSTAVO left       positive (+) FADIR left    RADIOLOGY:  Final results and radiologist's interpretation, available in the [facility] health record.  Images were reviewed with the patient in the office today.  My personal interpretation of the performed imaging:     AP Pelvis and left hip XR views reviewed 1/30/2025: no acute bony abnormality, mild bilateral hip joint degenerative change     Reviewed PT notes  Review of the result(s) of each unique test -  XR

## 2025-03-10 NOTE — PATIENT INSTRUCTIONS
We discussed these other possible diagnosis: SI joint and low back pain has improved with physical therapy, today's pain more consistent with hip joint pain. Some radiating pain could still be lumbar related.    We discussed the following treatment options: symptom treatment, activity modification/rest, imaging, injection, rehab and referral. Following discussion, plan: will trial US guided left hip injection next step.    Plan:  - Today's Plan of Care:  US guided left hip injection - corticosteroid    Discussed activity considerations and other supportive care including Ice/Heat, OTC and other topical medications as needed.    Continue PT and Home Exercise Program    -We also discussed other future treatment options:  MRI (likely hip v. Lumbar)    Follow Up: ~ 3 -4 weeks after injection    If you have any further questions for your physician or physician s care team you can call [phone].

## 2025-03-11 ENCOUNTER — MYC MEDICAL ADVICE (OUTPATIENT)
Dept: FAMILY MEDICINE | Facility: CLINIC | Age: 60
End: 2025-03-11
Payer: COMMERCIAL

## 2025-03-11 DIAGNOSIS — M16.12 ARTHRITIS OF LEFT HIP: Primary | ICD-10-CM

## 2025-03-13 NOTE — CONFIDENTIAL NOTE
Preauthorization for left hip Synvisc injection placed for appointment 3/18/25    Ramesh Hook MD

## 2025-03-18 ENCOUNTER — OFFICE VISIT (OUTPATIENT)
Dept: ORTHOPEDICS | Facility: CLINIC | Age: 60
End: 2025-03-18
Payer: COMMERCIAL

## 2025-03-18 DIAGNOSIS — M25.552 LEFT HIP PAIN: ICD-10-CM

## 2025-03-18 DIAGNOSIS — M16.12 ARTHRITIS OF LEFT HIP: Primary | ICD-10-CM

## 2025-03-18 PROCEDURE — 20611 DRAIN/INJ JOINT/BURSA W/US: CPT | Mod: LT | Performed by: FAMILY MEDICINE

## 2025-03-18 RX ORDER — ROPIVACAINE HYDROCHLORIDE 5 MG/ML
2 INJECTION, SOLUTION EPIDURAL; INFILTRATION; PERINEURAL
Status: COMPLETED | OUTPATIENT
Start: 2025-03-18 | End: 2025-03-18

## 2025-03-18 RX ORDER — BETAMETHASONE SODIUM PHOSPHATE AND BETAMETHASONE ACETATE 3; 3 MG/ML; MG/ML
6 INJECTION, SUSPENSION INTRA-ARTICULAR; INTRALESIONAL; INTRAMUSCULAR; SOFT TISSUE
Status: COMPLETED | OUTPATIENT
Start: 2025-03-18 | End: 2025-03-18

## 2025-03-18 RX ADMIN — BETAMETHASONE SODIUM PHOSPHATE AND BETAMETHASONE ACETATE 6 MG: 3; 3 INJECTION, SUSPENSION INTRA-ARTICULAR; INTRALESIONAL; INTRAMUSCULAR; SOFT TISSUE at 08:14

## 2025-03-18 RX ADMIN — ROPIVACAINE HYDROCHLORIDE 2 ML: 5 INJECTION, SOLUTION EPIDURAL; INFILTRATION; PERINEURAL at 08:14

## 2025-03-18 NOTE — PROGRESS NOTES
Large Joint Injection/Arthocentesis: L hip joint    Date/Time: 3/18/2025 8:14 AM    Performed by: Ramesh Hook MD  Authorized by: Ramesh Hook MD    Indications:  Pain and osteoarthritis  Needle Size:  21 G  Guidance: ultrasound    Approach:  Anterior  Location:  Hip      Site:  L hip joint  Medications:  6 mg betamethasone acet & sod phos 6 (3-3) MG/ML; 2 mL ROPivacaine 5 MG/ML  Outcome:  Tolerated well, no immediate complications  Procedure discussed: discussed risks, benefits, and alternatives    Consent Given by:  Patient  Timeout: timeout called immediately prior to procedure    Prep: patient was prepped and draped in usual sterile fashion     Ultrasound images of procedure were permanently stored.   Referred by Dr. Dunbar     Patient reported some improvement of pain after the numbing portion left hip joint steroid injection.  Ultrasound guided images were permanently stored.  Aftercare instructions given to patient.  Plan to follow-up as previously discussed with referring provider.     Ramesh Hook MD [facility] [place] [facility]

## 2025-03-18 NOTE — LETTER
3/18/2025      Emily Fritz  [address] [address] [facility] [place] [address]      Dear Colleague,    Thank you for referring your patient, Emily Fritz, to the [facility]. Please see a copy of my visit note below.    Large Joint Injection/Arthocentesis: L hip joint    Date/Time: 3/18/2025 8:14 AM    Performed by: Ramesh Hook MD  Authorized by: Ramesh Hook MD    Indications:  Pain and osteoarthritis  Needle Size:  21 G  Guidance: ultrasound    Approach:  Anterior  Location:  Hip      Site:  L hip joint  Medications:  6 mg betamethasone acet & sod phos 6 (3-3) MG/ML; 2 mL ROPivacaine 5 MG/ML  Outcome:  Tolerated well, no immediate complications  Procedure discussed: discussed risks, benefits, and alternatives    Consent Given by:  Patient  Timeout: timeout called immediately prior to procedure    Prep: patient was prepped and draped in usual sterile fashion     Ultrasound images of procedure were permanently stored.   Referred by Dr. Dunbar     Patient reported some improvement of pain after the numbing portion left hip joint steroid injection.  Ultrasound guided images were permanently stored.  Aftercare instructions given to patient.  Plan to follow-up as previously discussed with referring provider.     Ramesh Hook MD [facility]            Again, thank you for allowing me to participate in the care of your patient.        Sincerely,        Ramesh Hook MD    Electronically signed

## 2025-03-18 NOTE — PATIENT INSTRUCTIONS
[facility] Injection Discharge Instructions    Procedure: Left hip joint steroid injection    You may shower, however avoid swimming, tub baths or hot tubs for 24 hours following your procedure  You may have a mild to moderate increase in pain for several days following the injection.  It may take up to 14 days for the steroid medication to start working although you may feel the effect as early as a few days after the procedure.  You may use ice packs for 10-15 minutes, 3 to 4 times a day at the injection site for comfort  You may use anti-inflammatory medications (such as Ibuprofen or Aleve or Advil) or Tylenol for pain control if necessary  If you were fasting, you may resume your normal diet and medications after the procedure  If you have diabetes, check your blood sugar more frequently than usual as your blood sugar may be higher than normal for 10-14 days following a steroid injection. Contact your doctor who manages your diabetes if your blood sugar is higher than usual    If you experience any of the following, call [facility] @ [phone] or [phone]  -Fever over 100 degree F  -Swelling, bleeding, redness, drainage, warmth at the injection site  - New or worsening pain     It was great seeing you again today!    Ramesh Hook

## 2025-04-07 ENCOUNTER — ANCILLARY PROCEDURE (OUTPATIENT)
Dept: BONE DENSITY | Facility: CLINIC | Age: 60
End: 2025-04-07
Attending: PHYSICIAN ASSISTANT
Payer: COMMERCIAL

## 2025-04-07 ENCOUNTER — ANCILLARY PROCEDURE (OUTPATIENT)
Dept: MAMMOGRAPHY | Facility: CLINIC | Age: 60
End: 2025-04-07
Attending: PHYSICIAN ASSISTANT
Payer: COMMERCIAL

## 2025-04-07 DIAGNOSIS — Z78.0 POSTMENOPAUSAL ESTROGEN DEFICIENCY: ICD-10-CM

## 2025-04-07 DIAGNOSIS — Z12.31 VISIT FOR SCREENING MAMMOGRAM: ICD-10-CM

## 2025-04-07 PROCEDURE — 77067 SCR MAMMO BI INCL CAD: CPT | Performed by: STUDENT IN AN ORGANIZED HEALTH CARE EDUCATION/TRAINING PROGRAM

## 2025-04-07 PROCEDURE — 77080 DXA BONE DENSITY AXIAL: CPT | Performed by: RADIOLOGY

## 2025-04-07 PROCEDURE — 77063 BREAST TOMOSYNTHESIS BI: CPT | Performed by: STUDENT IN AN ORGANIZED HEALTH CARE EDUCATION/TRAINING PROGRAM

## 2025-04-14 ENCOUNTER — OFFICE VISIT (OUTPATIENT)
Dept: ORTHOPEDICS | Facility: CLINIC | Age: 60
End: 2025-04-14
Payer: COMMERCIAL

## 2025-04-14 DIAGNOSIS — M16.12 ARTHRITIS OF LEFT HIP: ICD-10-CM

## 2025-04-14 DIAGNOSIS — M25.552 LEFT HIP PAIN: Primary | ICD-10-CM

## 2025-04-14 PROCEDURE — 99214 OFFICE O/P EST MOD 30 MIN: CPT | Performed by: PEDIATRICS

## 2025-04-14 NOTE — PATIENT INSTRUCTIONS
We discussed these other possible diagnosis: Pain does appear hip joint related. Given not much improvement with hip injection, mild arthritis on x-ray, will obtain MRI to evaluate.    Plan:  - Today's Plan of Care:  MRI of the Left Hip - Call [phone] to schedule MRI     Discussed activity considerations and other supportive care including Ice/Heat, OTC and other topical medications as needed.    Continue Home Exercise Program    -We also discussed other future treatment options:  Referral to Orthopedic Surgery  Repeat US guided left hip joint injection  Further Lumbar Work up    Follow Up: In clinic with Dr. Dunbar after MRI (wait at least 1-2 days)    If you have any further questions for your physician or physician s care team you can call [phone].

## 2025-04-14 NOTE — LETTER
4/14/2025      Emily Fritz  [id number] [facility] [place] [id number]      Dear Colleague,    Thank you for referring your patient, Emily Fritz, to the [facility]. Please see a copy of my visit note below.    ASSESSMENT & PLAN    Elsa was seen today for pain and follow up.    Diagnoses and all orders for this visit:    Left hip pain  -     MR Hip Left w/o Contrast; Future    Arthritis of left hip  -     MR Hip Left w/o Contrast; Future      This issue is chronic and Unchanged.      ICD-10-CM    1. Left hip pain  M25.552 MR Hip Left w/o Contrast      2. Arthritis of left hip  M16.12 MR Hip Left w/o Contrast          We discussed these other possible diagnosis: Pain does appear hip joint related. Given not much improvement with hip injection, mild arthritis on x-ray, will obtain MRI to evaluate.    Plan:  - Today's Plan of Care:  MRI of the Left Hip - Call [phone] to schedule MRI     Discussed activity considerations and other supportive care including Ice/Heat, OTC and other topical medications as needed.    Continue Home Exercise Program    -We also discussed other future treatment options:  Referral to Orthopedic Surgery  Repeat US guided left hip joint injection  Further Lumbar Work up    Follow Up: In clinic with Dr. Dunbar after MRI (wait at least 1-2 days)    Concerning signs and symptoms were reviewed and all questions were answered at this time.    Lilian Dunbar MD [facility]  Sports Medicine Physician  [facility]    SUBJECTIVE- Interim History April 14, 2025    Chief Complaint   Patient presents with     Left Hip - Pain, Follow Up       Emily Fritz is a 59 year old female who is seen in f/u up for    Left hip pain  Arthritis of left hip.  Since last visit on 3/10/25, patient has been doing okay. She has good days and bad days, depending on what she is doing.  A lot of walking, yard work, line dancing class is irritating.  Since  las OV she has gotten an IA joint injection of the Left hip with Dr. Hook on 3/18/25 that she has not noticed much of a difference, maybe calmed it down a little pain ~ 40%.  - Now ~ 3+ months from initial onset     Worsened by: during the daytime, walking, any physical activity, sometimes sitting   Better with: AM is good, resting, icing  Treatments tried: rest/activity avoidance, ice, Tylenol, and ibuprofen, chiropractic care, physical therapy (10 visits in past), previous imaging (1/30/25), corticosteroid injection of IA Left hip, Monster on 3/18/25 that provided   Associated symptoms: no more tingling, some weakness of left hip, some feeling of instability, and stiffness     The patient is seen by themselves.     Orthopedic/Surgical history: YES - Been seen by Dr. Aguilar in the past for her shoulder.   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  CV: distal perfusion intact   Resp: normal respiratory effort without conversational dyspnea   Psych: normal mood and affect  Gait: NORMAL  Neuro: Normal light sensory exam of lower extremity     Bilateral hip exam  Inspection:      no edema or ecchymosis in hip area     Tender:      none currently     Non Tender:      remainder of the hip area left, no lumbar or SI joint pain     ROM:     Full active and passive ROM  bilateral  - mild pain with hip internal rotation left     Strength:      flexion 5/5 bilateral       abduction 4+/5 left       adduction 5/5 bilateral     Sensation:      grossly intact in hip and thigh     Special Tests:      neg (-) GUSTAVO left       positive (+) FADIR left    RADIOLOGY:  Final results and radiologist's interpretation, available in the [facility] health record.  Images were reviewed with the patient in the office today.  My personal interpretation  of the performed imaging:     AP Pelvis and left hip XR views reviewed 1/30/2025: no acute bony abnormality, mild bilateral hip joint degenerative change     Review of the result(s) of each unique test - XR             Again, thank you for allowing me to participate in the care of your patient.        Sincerely,        Lilian Dunbar MD    Electronically signed

## 2025-04-14 NOTE — PROGRESS NOTES
ASSESSMENT & PLAN    Elsa was seen today for pain and follow up.    Diagnoses and all orders for this visit:    Left hip pain  -     MR Hip Left w/o Contrast; Future    Arthritis of left hip  -     MR Hip Left w/o Contrast; Future      This issue is chronic and Unchanged.      ICD-10-CM    1. Left hip pain  M25.552 MR Hip Left w/o Contrast      2. Arthritis of left hip  M16.12 MR Hip Left w/o Contrast          We discussed these other possible diagnosis: Pain does appear hip joint related. Given not much improvement with hip injection, mild arthritis on x-ray, will obtain MRI to evaluate.    Plan:  - Today's Plan of Care:  MRI of the Left Hip - Call [phone] to schedule MRI     Discussed activity considerations and other supportive care including Ice/Heat, OTC and other topical medications as needed.    Continue Home Exercise Program    -We also discussed other future treatment options:  Referral to Orthopedic Surgery  Repeat US guided left hip joint injection  Further Lumbar Work up    Follow Up: In clinic with Dr. Dunbar after MRI (wait at least 1-2 days)    Concerning signs and symptoms were reviewed and all questions were answered at this time.    Lilian Dunbar MD [facility]  Sports Medicine Physician  [facility]    SUBJECTIVE- Interim History April 14, 2025    Chief Complaint   Patient presents with    Left Hip - Pain, Follow Up       Emily Fritz is a 59 year old female who is seen in f/u up for    Left hip pain  Arthritis of left hip.  Since last visit on 3/10/25, patient has been doing okay. She has good days and bad days, depending on what she is doing.  A lot of walking, yard work, line dancing class is irritating.  Since las OV she has gotten an IA joint injection of the Left hip with Dr. Hook on 3/18/25 that she has not noticed much of a difference, maybe calmed it down a little pain ~ 40%.  - Now ~ 3+ months from initial onset     Worsened by: during the daytime, walking, any  physical activity, sometimes sitting   Better with: AM is good, resting, icing  Treatments tried: rest/activity avoidance, ice, Tylenol, and ibuprofen, chiropractic care, physical therapy (10 visits in past), previous imaging (1/30/25), corticosteroid injection of IA Left hip, Monster on 3/18/25 that provided   Associated symptoms: no more tingling, some weakness of left hip, some feeling of instability, and stiffness     The patient is seen by themselves.     Orthopedic/Surgical history: YES - Been seen by Dr. Aguilar in the past for her shoulder.   Social History/Occupation: working at a [employer], likes to do Yoga        REVIEW OF SYSTEMS:  Review of Systems    OBJECTIVE:  LMP 07/22/2016 (Approximate)    General: healthy, alert and in no distress  Skin: no suspicious lesions or rash.  CV: distal perfusion intact   Resp: normal respiratory effort without conversational dyspnea   Psych: normal mood and affect  Gait: NORMAL  Neuro: Normal light sensory exam of lower extremity     Bilateral hip exam  Inspection:      no edema or ecchymosis in hip area     Tender:      none currently     Non Tender:      remainder of the hip area left, no lumbar or SI joint pain     ROM:     Full active and passive ROM  bilateral  - mild pain with hip internal rotation left     Strength:      flexion 5/5 bilateral       abduction 4+/5 left       adduction 5/5 bilateral     Sensation:      grossly intact in hip and thigh     Special Tests:      neg (-) GUSTAVO left       positive (+) FADIR left    RADIOLOGY:  Final results and radiologist's interpretation, available in the [facility] health record.  Images were reviewed with the patient in the office today.  My personal interpretation of the performed imaging:     AP Pelvis and left hip XR views reviewed 1/30/2025: no acute bony abnormality, mild bilateral hip joint degenerative change     Review of the result(s) of each unique test - XR

## 2025-04-29 ENCOUNTER — ANCILLARY PROCEDURE (OUTPATIENT)
Dept: MRI IMAGING | Facility: CLINIC | Age: 60
End: 2025-04-29
Attending: PEDIATRICS
Payer: COMMERCIAL

## 2025-04-29 DIAGNOSIS — M25.552 LEFT HIP PAIN: ICD-10-CM

## 2025-04-29 DIAGNOSIS — M16.12 ARTHRITIS OF LEFT HIP: ICD-10-CM

## 2025-04-29 PROCEDURE — 73721 MRI JNT OF LWR EXTRE W/O DYE: CPT | Mod: LT | Performed by: RADIOLOGY

## 2025-05-05 ENCOUNTER — NURSE TRIAGE (OUTPATIENT)
Dept: FAMILY MEDICINE | Facility: CLINIC | Age: 60
End: 2025-05-05
Payer: COMMERCIAL

## 2025-05-05 NOTE — TELEPHONE ENCOUNTER
This encounter is being created due to the "[employer] message dated 5/5/2025 as written below:   Elsa GONZALEZ Lam RITCHIE [facility] (supporting Kristen M Kehr, PA-C)3 hours ago (8:39 AM)       This past week I have had a few incidents with my heart.     A week ago I was making dinner and I got tight across my chest and pain up in my jaw just below my ears.       I have noticed that I can't seem to do bigger physical activities with out getting winded very easily.       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     Yesterday I was working in the yard clearing up a brush pile and dragging a tarp with brush to the fire pit, maybe 30 feet and that was a lot harder than I  thought it should be.   I continued on for a couple of hours and all of a sudden my heart took off,d vision was off everything was bright and fuzzy.  We have an oximeter with heart rate and I think about 20 min. after it started my  got that out and heart rate was 196.  I have had these episodes before but this one just seemed a bit different.  We started for the ER but it settled down to normal a few hours later I was fine.  I do think I should have a cardiologist check my heart out though.       Please let me know your thoughts.     Thank you.

## 2025-05-05 NOTE — TELEPHONE ENCOUNTER
Per protocol pt should be seen today d/t HR >140 and not present now. RN offered appointment, but pt stated she wants to see cardiology. RN advised she may need referral as it has been years since she was seen. Pt stated no referral is needed if she stays within her insurance network, RN verbalized understanding.     Pt declined primary care visit at this time.     Qian Lay RN    Reason for Disposition   Heart beating very rapidly (e.g., > 140 / minute) and not present now  (Exception: During exercise.)    Additional Information   Negative: Passed out (e.g., fainted, lost consciousness, blacked out and was not responding)   Negative: Shock suspected (e.g., cold/pale/clammy skin, too weak to stand, low BP, rapid pulse)   Negative: Difficult to awaken or acting confused (e.g., disoriented, slurred speech)   Negative: Visible sweat on face or sweat dripping down face   Negative: Unable to walk, or can only walk with assistance (e.g., requires support)   Negative: Received SHOCK from implantable cardiac defibrillator and has persisting symptoms (i.e., palpitations, lightheadedness)   Negative: Feeling weak or lightheaded (e.g., woozy, feeling like they might faint) AND heart beating very rapidly (e.g., > 140 / minute)   Negative: Feeling weak or lightheaded (e.g., woozy, feeling like they might faint) AND heart beating very slowly (e.g., < 50 / minute)   Negative: Sounds like a life-threatening emergency to the triager   Negative: Chest pain   Negative: Difficulty breathing   Negative: Feeling weak or lightheaded (e.g., woozy, feeling like they might faint)   Negative: Heart beating very rapidly (e.g., > 140 / minute) and present now  (Exception: During exercise.)   Negative: Heart beating very slowly (e.g., < 50 / minute)  (Exception: Athlete and heart rate normal for caller.)   Negative: New or worsened shortness of breath with activity (dyspnea on exertion)   Negative: Patient sounds very sick or weak to the  "triager   Negative: Wearing a cardiac monitor (e.g., cardiac event, Holter)   Negative: Received SHOCK from implantable cardiac defibrillator (and now feels well)    Answer Assessment - Initial Assessment Questions  1. DESCRIPTION: \"Please describe your heart rate or heartbeat that you are having\" (e.g., fast/slow, regular/irregular, skipped or extra beats, \"palpitations\")      Heart rate normal now. Increased HR during exertion yesterday. Checked in the evening at rest and was 86  2. ONSET: \"When did it start?\" (e.g., minutes, hours, days)       1 week  3. DURATION: \"How long does it last\" (e.g., seconds, minutes, hours)      hours  4. PATTERN \"Does it come and go, or has it been constant since it started?\"  \"Does it get worse with exertion?\"   \"Are you feeling it now?\"      Comes and goes  5. TAP: \"Using your hand, can you tap out what you are feeling on a chair or table in front of you, so that I can hear?\" Note: Not all patients can do this.        Feels regular, just fast during episodes  6. HEART RATE: \"Can you tell me your heart rate?\" \"How many beats in 15 seconds?\"  Note: Not all patients can do this.        Currently feels normal, pt does not have oximeter and could not check pulse   7. RECURRENT SYMPTOM: \"Have you ever had this before?\" If Yes, ask: \"When was the last time?\" and \"What happened that time?\"       Yes. Was seen by cardiology several years ago for the same symptoms. Had several tests done before that were all normal  8. CAUSE: \"What do you think is causing the palpitations?\"      Not sure  9. CARDIAC HISTORY: \"Do you have any history of heart disease?\" (e.g., heart attack, angina, bypass surgery, angioplasty, arrhythmia)       no  10. OTHER SYMPTOMS: \"Do you have any other symptoms?\" (e.g., dizziness, chest pain, sweating, difficulty breathing)        Chest tightness and pain into jaw during episode. Resolved, currently denies any symptoms  11. PREGNANCY: \"Is there any chance you are " "pregnant?\" \"When was your last menstrual period?\"        no    Protocols used: Heart Rate and Heartbeat [id number]    "

## 2025-05-05 NOTE — TELEPHONE ENCOUNTER
I left a message to return a call to [phone]. When the patient returns a call please triage her symptoms.  Nancy Steele R.N.

## 2025-05-08 ENCOUNTER — OFFICE VISIT (OUTPATIENT)
Dept: ORTHOPEDICS | Facility: CLINIC | Age: 60
End: 2025-05-08
Payer: COMMERCIAL

## 2025-05-08 DIAGNOSIS — M16.12 ARTHRITIS OF LEFT HIP: ICD-10-CM

## 2025-05-08 DIAGNOSIS — M53.3 SACROILIAC JOINT PAIN: ICD-10-CM

## 2025-05-08 DIAGNOSIS — M25.552 LEFT HIP PAIN: Primary | ICD-10-CM

## 2025-05-08 NOTE — PATIENT INSTRUCTIONS
We discussed these other possible diagnosis: Left sided pain, mild arthritis and cartilage fissuring on MRI. Given lack of improvement with prior PT and hip joint injection will trial SI joint injection next step.    Plan:  - Today's Plan of Care:  Referral for Left SI joint injection  Continue Home Exercise Program    Discussed activity considerations and other supportive care including Ice/Heat, OTC and other topical medications as needed.    -We also discussed other future treatment options:  Further lumbar work up (?MRI)  Trochanteric bursa injection  Referral to Orthopedic Surgery    Follow Up: 2-3 weeks after SI joint injection, likely with Dr. Hook in my absence    If you have any further questions for your physician or physician s care team you can call [phone].

## 2025-05-08 NOTE — PROGRESS NOTES
ASSESSMENT & PLAN    Elsa was seen today for pain, follow up and mri transcription.    Diagnoses and all orders for this visit:    Left hip pain  -     Pain Management  Referral; Future    Arthritis of left hip    Sacroiliac joint pain  -     Pain Management  Referral; Future      This issue is chronic and Unchanged.      ICD-10-CM    1. Left hip pain  M25.552 Pain Management  Referral      2. Arthritis of left hip  M16.12       3. Sacroiliac joint pain  M53.3 Pain Management  Referral          We discussed these other possible diagnosis: Left sided pain, mild arthritis and cartilage fissuring on MRI. Given lack of improvement with prior PT and hip joint injection will trial SI joint injection next step.    Plan:  - Today's Plan of Care:  Referral for Left SI joint injection  Continue Home Exercise Program    Discussed activity considerations and other supportive care including Ice/Heat, OTC and other topical medications as needed.    -We also discussed other future treatment options:  Further lumbar work up (?MRI)  Trochanteric bursa injection  Referral to Orthopedic Surgery    Follow Up: 2-3 weeks after SI joint injection, likely with Dr. Hook in my absence    Concerning signs and symptoms were reviewed and all questions were answered at this time.    Lilain Dunbar MD [facility]  Sports Medicine Physician  [facility]      SUBJECTIVE- Interim History May 8, 2025    Chief Complaint   Patient presents with    Left Hip - Pain, Follow Up, MRI Transcription       Emily Fritz is a 59 year old female who is seen in f/u up for    Left hip pain  Arthritis of left hip  Sacroiliac joint pain.  Since last visit on 4/14/25, patient has been doing about the same. She has been doing the Home Exercise Program exercises that are not aggravating her hip. She has obtained the MRI of the left hip, here to review results.  - Now ~ 4+ months from initial onset    - Pain is  posterior and lateral hip into groin and down to knee at times.  Worsened by: during the daytime, walking, any physical activity, sometimes sitting, laying on her L or R side while sleeping   Better with: AM is good, resting, icing  Treatments tried: rest/activity avoidance, ice, Tylenol, and ibuprofen, chiropractic care, physical therapy (10 visits in past), previous imaging (1/30/25, MRI 4/29/25), corticosteroid injection of IA Left hip, Monster on 3/18/25 that didn't really help  Associated symptoms: no more tingling, some weakness of left hip, some feeling of instability, and stiffness     The patient is seen by themselves.     Orthopedic/Surgical history: YES - Been seen by Dr. Aguilar in the past for her shoulder.   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  CV: distal perfusion intact   Resp: normal respiratory effort without conversational dyspnea   Psych: normal mood and affect  Gait: NORMAL  Neuro: Normal light sensory exam of lower extremity     Bilateral hip exam  Inspection:      no edema or ecchymosis in hip area     Tender:      mild posterior hip/gluteal region, none lateral hip     Non Tender:      remainder of the hip area left     ROM:     Full active and passive ROM  bilateral  - mild pain with hip internal rotation left     Strength:      flexion 5/5 bilateral       abduction 4+/5 left       adduction 5/5 bilateral     Sensation:      grossly intact in hip and thigh     Special Tests:      positive (+) GUSTAVO left       Neg (-) FADIR left       RADIOLOGY:  Final results and radiologist's interpretation, available in the [facility] health record.  Images were reviewed with the patient in the office today.  My personal interpretation of the performed imaging:     Reviewed MRI left hip 4/29/2025 - focal subchondral  edema anterior column of left hip, high grade cartilage fissuring, gluteal minimus tendinosis    Results for orders placed or performed in visit on 04/29/25   MR Hip Left w/o Contrast    Narrative    MR left hip without contrast 4/29/2025 10:51 AM    Techniques: Multiplanar multisequence imaging of the left hip was  obtained without  administration of intra-articular or intravenous  contrast using routine protocol.    History: eval left hip arthritis; Left hip pain; Arthritis of left hip      Comparison: Pelvis radiograph 5/30/2025    Findings:    Osseous structures  Osseous structures: No fracture, stress reaction, avascular necrosis,  or focal osseous lesion is seen. Mildly prominent red marrow signal in  the bones.    Articular cartilage and labrum  Assessment limited on this non-arthrographic study due to relative  lack of joint distension.    Articular cartilage: Subchondral cystlike change in the anterior  acetabulum (axial series image 27)    Labrum: No tear suggested on this non-arthrographic study.    Ligament teres and transverse ligament of acetabulum: Intact.    Joint or bursal effusion    Joint effusion: A physiologic amount of joint fluid.    Bursal effusion: Minimal nonspecific edema over the greater  trochanter. No substantial iliopsoas or trochanteric bursal effusion.    Muscles and tendons  Muscles and tendons: Proximal hamstrings, rectus femoris, sartorius,  and iliopsoas tendons intact. Gluteus minimus tendinosis with  low-grade intrasubstance tear at the trochanteric attachment (axial  series image 36). Small fluid within the subgluteus minimus bursa. The  visualized adductor muscles are unremarkable.     Nerves:  The visualized course of the sciatic nerve is unremarkable.    Other Findings:  None.      Impression    Impression:  1. Focal area of subchondral edema along the anterior column of the  left hip with likely adjacent high grade cartilage fissuring.    2. Gluteus minimus tendinosis with  low-grade intrasubstance tear at  the trochanteric attachment.    3. No marrow signal changes to suggest fracture, osteonecrosis, or  marrow infiltration.    4. No significant left hip joint effusion.    I have personally reviewed the examination and initial interpretation  and I agree with the findings.    CARLEE BERKOWITZ MD         SYSTEM ID:  [id number]       Review of the result(s) of each unique test - MRI

## 2025-05-08 NOTE — LETTER
5/8/2025      Emily Fritz  [address] [facility] [place] [address]      Dear Colleague,    Thank you for referring your patient, Emily Fritz, to the [facility]. Please see a copy of my visit note below.    ASSESSMENT & PLAN    Elsa was seen today for pain, follow up and mri transcription.    Diagnoses and all orders for this visit:    Left hip pain  -     Pain Management  Referral; Future    Arthritis of left hip    Sacroiliac joint pain  -     Pain Management  Referral; Future      This issue is chronic and Unchanged.      ICD-10-CM    1. Left hip pain  M25.552 Pain Management  Referral      2. Arthritis of left hip  M16.12       3. Sacroiliac joint pain  M53.3 Pain Management  Referral          We discussed these other possible diagnosis: Left sided pain, mild arthritis and cartilage fissuring on MRI. Given lack of improvement with prior PT and hip joint injection will trial SI joint injection next step.    Plan:  - Today's Plan of Care:  Referral for Left SI joint injection  Continue Home Exercise Program    Discussed activity considerations and other supportive care including Ice/Heat, OTC and other topical medications as needed.    -We also discussed other future treatment options:  Further lumbar work up (?MRI)  Trochanteric bursa injection  Referral to Orthopedic Surgery    Follow Up: 2-3 weeks after SI joint injection, likely with Dr. Hook in my absence    Concerning signs and symptoms were reviewed and all questions were answered at this time.    Lilian Dunbar MD [facility]  Sports Medicine Physician  [facility]      SUBJECTIVE- Interim History May 8, 2025    Chief Complaint   Patient presents with     Left Hip - Pain, Follow Up, MRI Transcription       Emily Fritz is a 59 year old female who is seen in f/u up for    Left hip pain  Arthritis of left hip  Sacroiliac joint pain.  Since last visit  on 4/14/25, patient has been doing about the same. She has been doing the Home Exercise Program exercises that are not aggravating her hip. She has obtained the MRI of the left hip, here to review results.  - Now ~ 4+ months from initial onset    - Pain is posterior and lateral hip into groin and down to knee at times.  Worsened by: during the daytime, walking, any physical activity, sometimes sitting, laying on her L or R side while sleeping   Better with: AM is good, resting, icing  Treatments tried: rest/activity avoidance, ice, Tylenol, and ibuprofen, chiropractic care, physical therapy (10 visits in past), previous imaging (1/30/25, MRI 4/29/25), corticosteroid injection of IA Left hip, [place] on 3/18/25 that didn't really help  Associated symptoms: no more tingling, some weakness of left hip, some feeling of instability, and stiffness     The patient is seen by themselves.     Orthopedic/Surgical history: YES - Been seen by Dr. Aguilar in the past for her shoulder.   Social History/Occupation: working at a [employer], likes to do Yoga     REVIEW OF SYSTEMS:  Review of Systems    OBJECTIVE:  LMP 07/22/2016 (Approximate)    General: healthy, alert and in no distress  Skin: no suspicious lesions or rash.  CV: distal perfusion intact   Resp: normal respiratory effort without conversational dyspnea   Psych: normal mood and affect  Gait: NORMAL  Neuro: Normal light sensory exam of lower extremity     Bilateral hip exam  Inspection:      no edema or ecchymosis in hip area     Tender:      mild posterior hip/gluteal region, none lateral hip     Non Tender:      remainder of the hip area left     ROM:     Full active and passive ROM  bilateral  - mild pain with hip internal rotation left     Strength:      flexion 5/5 bilateral       abduction 4+/5 left       adduction 5/5 bilateral     Sensation:      grossly intact in hip and thigh     Special Tests:      positive (+) GUSTAVO left       Neg (-) FADIR left        RADIOLOGY:  Final results and radiologist's interpretation, available in the [facility] health record.  Images were reviewed with the patient in the office today.  My personal interpretation of the performed imaging:     Reviewed MRI left hip 4/29/2025 - focal subchondral edema anterior column of left hip, high grade cartilage fissuring, gluteal minimus tendinosis    Results for orders placed or performed in visit on 04/29/25   MR Hip Left w/o Contrast    Narrative    MR left hip without contrast 4/29/2025 10:51 AM    Techniques: Multiplanar multisequence imaging of the left hip was  obtained without  administration of intra-articular or intravenous  contrast using routine protocol.    History: eval left hip arthritis; Left hip pain; Arthritis of left hip      Comparison: Pelvis radiograph 5/30/2025    Findings:    Osseous structures  Osseous structures: No fracture, stress reaction, avascular necrosis,  or focal osseous lesion is seen. Mildly prominent red marrow signal in  the bones.    Articular cartilage and labrum  Assessment limited on this non-arthrographic study due to relative  lack of joint distension.    Articular cartilage: Subchondral cystlike change in the anterior  acetabulum (axial series image 27)    Labrum: No tear suggested on this non-arthrographic study.    Ligament teres and transverse ligament of acetabulum: Intact.    Joint or bursal effusion    Joint effusion: A physiologic amount of joint fluid.    Bursal effusion: Minimal nonspecific edema over the greater  trochanter. No substantial iliopsoas or trochanteric bursal effusion.    Muscles and tendons  Muscles and tendons: Proximal hamstrings, rectus femoris, sartorius,  and iliopsoas tendons intact. Gluteus minimus tendinosis with  low-grade intrasubstance tear at the trochanteric attachment (axial  series image 36). Small fluid within the subgluteus minimus bursa. The  visualized adductor muscles are unremarkable.     Nerves:  The visualized  course of the sciatic nerve is unremarkable.    Other Findings:  None.      Impression    Impression:  1. Focal area of subchondral edema along the anterior column of the  left hip with likely adjacent high grade cartilage fissuring.    2. Gluteus minimus tendinosis with low-grade intrasubstance tear at  the trochanteric attachment.    3. No marrow signal changes to suggest fracture, osteonecrosis, or  marrow infiltration.    4. No significant left hip joint effusion.    I have personally reviewed the examination and initial interpretation  and I agree with the findings.    CARLEE BERKOWITZ MD         SYSTEM ID:  [id number]       Review of the result(s) of each unique test - MRI           Again, thank you for allowing me to participate in the care of your patient.        Sincerely,        Lilian Dunbar MD    Electronically signed

## 2025-05-13 ENCOUNTER — TELEPHONE (OUTPATIENT)
Dept: PALLIATIVE MEDICINE | Facility: CLINIC | Age: 60
End: 2025-05-13
Payer: COMMERCIAL

## 2025-05-13 DIAGNOSIS — M53.3 SI (SACROILIAC) JOINT DYSFUNCTION: Primary | ICD-10-CM

## 2025-05-13 NOTE — TELEPHONE ENCOUNTER
NO PA REQUIRED, OKAY TO SCHEDULE      Britni DASH  Complex   [facility]

## 2025-05-13 NOTE — TELEPHONE ENCOUNTER
"Screening Questions for Radiology Injections:    Injection to be done at which interventional clinic site? [facility] [place]    If choosing [facility] for location, please inform patient:  \"[facility] is a Hospital based clinic. Before your visit, you should check with your insurance about how it covers the charges for facility services in a hospital-based clinic.     Procedure ordered by Bettina    Procedure ordered? Left SI Joint/Bursa Injection  Transforaminal Cervical WILLIE - [place] [facility] [facility] - No [facility] Site providers perform this procedure    What insurance would patient like us to bill for this procedure? BC  IF SCHEDULING IN [place] [facility] OR SPINE PLEASE SCHEDULE AT LEAST 7-10 BUSINESS DAYS OUT SO A PA CAN BE OBTAINED  Worker's comp or MVA (motor vehicle accident) -Any injection DO NOT SCHEDULE and route to Katherine Billingsley.    [employer] insurance - For ALL INJECTIONS DO NOT SCHEDULE and route to Britni Rust.     ALL BCBS, [employer] and [employer] - DO NOT SCHEDULE and route to Britni Rust  MEDICA- ALL INJECTIONS- route to Britni Rust    Is patient scheduled at [facility]? no   If YES, route every encounter to [facility] CARE NAVIGATION POOL [[phone]]    Is an  needed? No     Patient has a  home? (Review Grid) YES: ok    Any chance of pregnancy? NO   If YES, do NOT schedule and route to RN pool  - Dr. Riley route to PM&R Nurse  [[id number]]      Is patient actively being treated for cancer or immunocompromised? No  If YES, do NOT schedule and route to RN pool/ Dr. Riley's Team    Does the patient have a bleeding or clotting disorder? No   If YES, okay to schedule AND route to RN nurse / Dr. Riley's Team   (For any patients with platelet count <100, RN must forward to provider)    Is patient taking any Blood Thinners OR Antiplatelet medication?  No   If hold needed, do NOT schedule, route to RN eloy/ Dr. Riley's " Team  Examples:   Blood Thinners: (Coumadin, Warfarin, Jantoven, Pradaxa, Xarelto, Eliquis, Edoxaban, Enoxaparin, Lovenox, Heparin, Arixtra, Fondaparinux or Fragmin)  Antiplatelet Medications: (Plavix, Brilinta or Effient)     Is patient taking any aspirin products (includes Excedrin and Fiorinal)? No.    If yes route to RN pool/ Dr. Riley's Team - Do not schedule    Is patient taking any GLP-1 Antagonist (hold needed for sedation patients only) No   (semaglutide (Ozempic, Wegovy), dulaglutide (Trulicity), exenatide ER (Bydureon), tirzepatide (Mounjaro), Liraglutide (Saxenda, Victoza), semaglutide (Rybelsus), Terzepatide (Zepbound)  If YES, okay to schedule AND route to RN  / Dr. Wheats Team      Any allergies to contrast dye, iodine, shellfish, or numbing and steroid medications? No  If YES, schedule and add allergy information to appointment notes AND route to the RN pool/ Dr. Riley's Team  If WILLIE and Contrast Dye / Iodine Allergy? DO NOT SCHEDULE, route to RN pool/ Dr. Riley's Team  Allergies: Nkda [no known drug allergy]     Does patient have an active infection or treated for one within the past week? No  Is patient currently taking any antibiotics or steroid medications?  No   For patients on chronic, preventative, or prophylactic antibiotics, procedures may be scheduled.   For patients on antibiotics for active or recent infection, schedule 4 days after completed.  For patients on steroid medications, schedule 4 days after completed.     Has the patient had a flu shot or any other vaccinations within the past 7 days? No  If yes, explain that for the vaccine to work best they need to:     wait 1 week before and 1 week after getting any Vaccine  wait 1 week before and 2 weeks after getting any Covid Vaccine   If patient has concerns about the timing, send to RN pool/ Dr. Riley's Team    Does patient have an MRI/CT?  Not Applicable Include Date and Check Procedure Scheduling Grid to see if  required.  Was the MRI/CT done within the last 3 years?  NA   If no route to RN Pool/ Dr. Riley's Team  If yes, where was the MRI/CT done?    Refer to PACS Transmissions list for approved external locations and route to RN Pool High Priority/ Dr. Wheats Team  If MRI was not done at approved external location do NOT schedule and route to RN pool/ Dr. Wheats Team    If patient has an imaging disc, the injection MAY be scheduled but patient must bring disc to appt or appt will be cancelled.    Is patient able to transfer to a procedure table with minimal or no assistance? Yes   If no, do NOT schedule and route to RN Pool/ Dr. Riley's Team    Procedure Specific Instructions:  If celiac plexus block, informed patient NPO for 6 hours and that it is okay to take medications with sips of water, especially blood pressure medications Not Applicable       If this is for a cervical procedure, informed patient that aspirin needs to be held for 6 days.   Not Applicable    Sedation, If Sedation is ordered for any procedure, patient must be NPO for 6 hours prior to procedure Not Applicable    If IV needed:  Do not schedule procedures requiring IV placement in the first appointment of the day or first appointment after lunch. Do NOT schedule at 0745, 0815 or 1245.   Instructed patient to arrive 30 minutes early for IV start if required. (Check Procedure Scheduling Grid)  Not Applicable    Reminders:  If you are started on any steroids or antibiotics between now and your appointment, you must contact us because the procedure may need to be cancelled.  Yes    As a reminder, receiving steroids can decrease your body's ability to fight infection.   Would you still like to move forward with scheduling the injection?  Yes    IV Sedation is not provided for procedures. If oral anti-anxiety medication is needed, the patient should request this from their referring provider.    Instruct patient to arrive as directed prior to the  scheduled appointment time:  If IV needed 30 minutes before appointment time     For patients 85 or older we recommend having an adult stay w/ them for the remainder of the day.     If the patient is Diabetic, remind them to bring their glucometer.    Dr. Christie Pt's - Imaging Orders Needed   Please send all injections to RN Pool NO   Red Flags? NO    Does the patient have any questions?  NO  Brenda Billingsley  [facility]

## 2025-05-14 NOTE — TELEPHONE ENCOUNTER
LVM to schedule Left SI Joint/Bursa Injection           Katherine Jose Cruz  Complex   [facility]  Pain Management

## 2025-05-30 ENCOUNTER — RADIOLOGY INJECTION OFFICE VISIT (OUTPATIENT)
Dept: PALLIATIVE MEDICINE | Facility: CLINIC | Age: 60
End: 2025-05-30
Attending: PEDIATRICS
Payer: COMMERCIAL

## 2025-05-30 VITALS
DIASTOLIC BLOOD PRESSURE: 105 MMHG | HEART RATE: 75 BPM | RESPIRATION RATE: 16 BRPM | SYSTOLIC BLOOD PRESSURE: 152 MMHG | OXYGEN SATURATION: 99 %

## 2025-05-30 DIAGNOSIS — M53.3 SI (SACROILIAC) JOINT DYSFUNCTION: Primary | ICD-10-CM

## 2025-05-30 DIAGNOSIS — M53.3 SACROILIAC JOINT PAIN: ICD-10-CM

## 2025-05-30 DIAGNOSIS — M25.552 LEFT HIP PAIN: ICD-10-CM

## 2025-05-30 PROCEDURE — 27096 INJECT SACROILIAC JOINT: CPT | Mod: LT | Performed by: PAIN MEDICINE

## 2025-05-30 RX ORDER — TRIAMCINOLONE ACETONIDE 40 MG/ML
40 INJECTION, SUSPENSION INTRA-ARTICULAR; INTRAMUSCULAR ONCE
Status: COMPLETED | OUTPATIENT
Start: 2025-05-30 | End: 2025-05-30

## 2025-05-30 RX ADMIN — TRIAMCINOLONE ACETONIDE 40 MG: 40 INJECTION, SUSPENSION INTRA-ARTICULAR; INTRAMUSCULAR at 10:32

## 2025-05-30 ASSESSMENT — PAIN SCALES - GENERAL
PAINLEVEL_OUTOF10: MILD PAIN (3)
PAINLEVEL_OUTOF10: MILD PAIN (3)

## 2025-05-30 NOTE — NURSING NOTE
Discharge Information    IV Discontiued Time:  NA    Amount of Fluid Infused:  NA    Discharge Criteria = When patient returns to baseline or as per MD order    Consciousness:  Pt is fully awake    Circulation:  BP +/- 20% of pre-procedure level    Respiration:  Patient is able to breathe deeply    O2 Sat:  Patient is able to maintain O2 Sat >92% on room air    Activity:  Moves 4 extremities on command    Ambulation:  Patient is able to stand and walk or stand and pivot into wheelchair    Dressing:  Clean/dry or No Dressing    Notes:   Discharge instructions and AVS given to patient    Patient meets criteria for discharge?  YES    Admitted to PCU?  No    Responsible adult present to accompany patient home?  Yes    Signature/Title:    Richie Arreguin RN  RN Care Coordinator  [facility] [place]

## 2025-05-30 NOTE — PATIENT INSTRUCTIONS
[facility]   Procedure Discharge Instructions    Today you saw: Dr. Viktor Alford     You had an:    sacroiliac joint injection       Be cautious when walking. Numbness and/or weakness in the lower extremities may occur for up to 6-8 hours after the procedure due to effect of the local anesthetic  Do not drive for 6 hours. The effect of the local anesthetic could slow your reflexes.   You may resume your regular activities after 24 hours  Avoid strenuous activity for the first 24 hours  You may shower, however avoid swimming, tub baths or hot tubs for 24 hours following your procedure  You may have a mild to moderate increase in pain for several days following the injection.  It may take up to 14 days for the steroid medication to start working although you may feel the effect as early as a few days after the procedure.     You may use ice packs for 10-15 minutes, 3 to 4 times a day at the injection site for comfort  Do not use heat to painful areas for 6 to 8 hours. This will give the local anesthetic time to wear off and prevent you from accidentally burning your skin.   Unless you have been directed to avoid the use of anti-inflammatory medications (NSAIDS), you may use medications such as ibuprofen, Aleve or Tylenol for pain control if needed.   Possible side effects of steroids that you may experience include flushing, elevated blood pressure, increased appetite, mild headaches and restlessness.  All of these symptoms will get better with time.  If you experience any of the following, call the Pain Clinic during work hours (Mon-Friday 8-4:30 pm) at [phone] or the Provider Line after hours at [phone]:  -Fever over 100 degree F  -Swelling, bleeding, redness, drainage, warmth at the injection site  -Progressive weakness or numbness in your legs   -Unusual new onset of pain that is not improving

## 2025-06-02 NOTE — PROGRESS NOTES
Pre procedure Diagnosis: SI joint dysfunction    Post procedure Diagnosis: Same  Procedure performed: left SI joint injection  Anesthesia: none  Complications: none  Operators: Viktor Alford MD     Indications:   Emily Fritz is a 59 year old female. The patient has a history of left upper buttocks pain. Other conservative treatments prior to injection include meds/pt.    Options/alternatives, benefits and risks were discussed with the patient including bleeding, infection, tissue trauma, exposure to radiation, reaction to medications including seizure, nerve injury, weakness, and numbness.  Questions were answered to her satisfaction and she agrees to proceed. Voluntary informed consent was obtained and signed.     Vitals were reviewed: Yes  Allergies were reviewed:  Yes   Medications were reviewed:  Yes   Pre-procedure pain score: 3/10    Procedure:  After obtaining signed informed consent, the patient was brought into the procedure suite and was placed in a prone position on the procedure table.   A Pause for the Cause was performed.  The patient was prepped and draped in the usual sterile fashion.     After identifying the left SI joint, the C-arm was rotated obliquely to obtain the best view of the inferior angle of the joint.  Then 3 ml of Lidocaine 1%  was used to anesthetize the skin at a skin entry site coaxial with the fluoroscopy beam at this location.  A 22 gauge 3.5 inch needle was advanced under intermittent fluoroscopy until it was felt to enter the SI joint.  Aspiration was negative.    A total of 1ml of Omnipaque-300 was injected, confirming appropriate position, with spread into the intraarticular space, with no intravascular uptake noted.  9ml of Omnipaque was wasted. Location was verified in lateral view.    2 ml of 0.5% bupivacaine with 40mg of Kenalog was injected.  The needle was removed.     Hemostasis was achieved, the area was cleaned, and bandaids were placed when appropriate.   The patient tolerated the procedure well, and was taken to the recovery room.  Images were saved to PACS.    Post-procedure pain score: 3/10  Follow-up includes:  -f/u with referring Physician     Viktor Alford MD  [facility] [place]

## 2025-08-13 ENCOUNTER — TELEPHONE (OUTPATIENT)
Dept: CARDIOLOGY | Facility: CLINIC | Age: 60
End: 2025-08-13

## 2025-08-13 ENCOUNTER — ORDERS ONLY (AUTO-RELEASED) (OUTPATIENT)
Dept: CARDIOLOGY | Facility: CLINIC | Age: 60
End: 2025-08-13

## 2025-08-13 ENCOUNTER — MYC MEDICAL ADVICE (OUTPATIENT)
Dept: CARDIOLOGY | Facility: CLINIC | Age: 60
End: 2025-08-13

## 2025-08-13 ENCOUNTER — OFFICE VISIT (OUTPATIENT)
Dept: CARDIOLOGY | Facility: CLINIC | Age: 60
End: 2025-08-13
Payer: COMMERCIAL

## 2025-08-13 VITALS
SYSTOLIC BLOOD PRESSURE: 147 MMHG | DIASTOLIC BLOOD PRESSURE: 77 MMHG | HEART RATE: 80 BPM | BODY MASS INDEX: 22.27 KG/M2 | OXYGEN SATURATION: 97 % | WEIGHT: 153 LBS

## 2025-08-13 DIAGNOSIS — R00.0 TACHYCARDIA: ICD-10-CM

## 2025-08-13 DIAGNOSIS — R00.0 TACHYCARDIA: Primary | ICD-10-CM

## 2025-08-13 PROCEDURE — 99205 OFFICE O/P NEW HI 60 MIN: CPT | Performed by: INTERNAL MEDICINE

## 2025-08-13 RX ORDER — METOPROLOL TARTRATE 50 MG
50 TABLET ORAL SEE ADMIN INSTRUCTIONS
Qty: 2 TABLET | Refills: 0 | Status: SHIPPED | OUTPATIENT
Start: 2025-08-13

## 2025-08-13 RX ORDER — ASPIRIN 81 MG/1
81 TABLET ORAL DAILY
COMMUNITY
Start: 2025-08-13

## (undated) DEVICE — SOL WATER IRRIG 500ML BOTTLE 2F7113

## (undated) DEVICE — SUCTION MANIFOLD NEPTUNE 2 SYS 1 PORT 702-025-000

## (undated) DEVICE — ENDO FORCEP SPIKED SERRATED SHAFT JUMBO 239CM G56998

## (undated) DEVICE — GOWN IMPERVIOUS 2XL BLUE

## (undated) DEVICE — SPECIMEN CONTAINER 3OZ W/FORMALIN 59901

## (undated) DEVICE — TUBING SUCTION 12"X1/4" N612

## (undated) DEVICE — KIT ENDO TURNOVER/PROCEDURE CARRY-ON 101822

## (undated) DEVICE — ENDO FORCEP BX CAPTURA PRO SPIKE G50696

## (undated) RX ORDER — ACETAMINOPHEN 325 MG/1
TABLET ORAL
Status: DISPENSED
Start: 2021-07-27

## (undated) RX ORDER — METOPROLOL TARTRATE 1 MG/ML
INJECTION, SOLUTION INTRAVENOUS
Status: DISPENSED
Start: 2017-12-06

## (undated) RX ORDER — METOPROLOL TARTRATE 100 MG
TABLET ORAL
Status: DISPENSED
Start: 2017-12-06